# Patient Record
Sex: FEMALE | Race: ASIAN | NOT HISPANIC OR LATINO | Employment: FULL TIME | URBAN - METROPOLITAN AREA
[De-identification: names, ages, dates, MRNs, and addresses within clinical notes are randomized per-mention and may not be internally consistent; named-entity substitution may affect disease eponyms.]

---

## 2017-08-21 ENCOUNTER — NON-PROVIDER VISIT (OUTPATIENT)
Dept: URGENT CARE | Facility: PHYSICIAN GROUP | Age: 22
End: 2017-08-21

## 2017-08-21 DIAGNOSIS — Z02.1 PRE-EMPLOYMENT DRUG SCREENING: ICD-10-CM

## 2017-08-21 LAB
AMP AMPHETAMINE: NORMAL
COC COCAINE: NORMAL
INT CON NEG: NEGATIVE
INT CON POS: POSITIVE
MET METHAMPHETAMINES: NORMAL
OPI OPIATES: NORMAL
PCP PHENCYCLIDINE: NORMAL
POC DRUG COMMENT 753798-OCCUPATIONAL HEALTH: NORMAL
THC: NORMAL

## 2017-08-21 PROCEDURE — 80305 DRUG TEST PRSMV DIR OPT OBS: CPT | Performed by: PHYSICIAN ASSISTANT

## 2020-06-24 ENCOUNTER — APPOINTMENT (OUTPATIENT)
Dept: RADIOLOGY | Facility: MEDICAL CENTER | Age: 25
End: 2020-06-24
Attending: EMERGENCY MEDICINE
Payer: MEDICAID

## 2020-06-24 ENCOUNTER — HOSPITAL ENCOUNTER (EMERGENCY)
Facility: MEDICAL CENTER | Age: 25
End: 2020-06-24
Attending: EMERGENCY MEDICINE
Payer: MEDICAID

## 2020-06-24 VITALS
WEIGHT: 98.11 LBS | RESPIRATION RATE: 14 BRPM | DIASTOLIC BLOOD PRESSURE: 57 MMHG | BODY MASS INDEX: 18.52 KG/M2 | TEMPERATURE: 97.8 F | HEIGHT: 61 IN | HEART RATE: 86 BPM | OXYGEN SATURATION: 98 % | SYSTOLIC BLOOD PRESSURE: 99 MMHG

## 2020-06-24 DIAGNOSIS — Z3A.13 13 WEEKS GESTATION OF PREGNANCY: ICD-10-CM

## 2020-06-24 DIAGNOSIS — N39.0 ACUTE UTI: ICD-10-CM

## 2020-06-24 LAB
ABO GROUP BLD: NORMAL
ALBUMIN SERPL BCP-MCNC: 3.7 G/DL (ref 3.2–4.9)
ALBUMIN/GLOB SERPL: 1 G/DL
ALP SERPL-CCNC: 62 U/L (ref 30–99)
ALT SERPL-CCNC: 21 U/L (ref 2–50)
ANION GAP SERPL CALC-SCNC: 11 MMOL/L (ref 7–16)
APPEARANCE UR: ABNORMAL
AST SERPL-CCNC: 20 U/L (ref 12–45)
B-HCG SERPL-ACNC: ABNORMAL MIU/ML (ref 0–5)
BACTERIA #/AREA URNS HPF: ABNORMAL /HPF
BASOPHILS # BLD AUTO: 0.5 % (ref 0–1.8)
BASOPHILS # BLD: 0.07 K/UL (ref 0–0.12)
BILIRUB SERPL-MCNC: 0.5 MG/DL (ref 0.1–1.5)
BILIRUB UR QL STRIP.AUTO: NEGATIVE
BUN SERPL-MCNC: 9 MG/DL (ref 8–22)
CALCIUM SERPL-MCNC: 8.6 MG/DL (ref 8.5–10.5)
CHLORIDE SERPL-SCNC: 101 MMOL/L (ref 96–112)
CO2 SERPL-SCNC: 20 MMOL/L (ref 20–33)
COLOR UR: ABNORMAL
CREAT SERPL-MCNC: 0.5 MG/DL (ref 0.5–1.4)
EOSINOPHIL # BLD AUTO: 0.11 K/UL (ref 0–0.51)
EOSINOPHIL NFR BLD: 0.8 % (ref 0–6.9)
EPI CELLS #/AREA URNS HPF: ABNORMAL /HPF
ERYTHROCYTE [DISTWIDTH] IN BLOOD BY AUTOMATED COUNT: 40.7 FL (ref 35.9–50)
GLOBULIN SER CALC-MCNC: 3.6 G/DL (ref 1.9–3.5)
GLUCOSE SERPL-MCNC: 91 MG/DL (ref 65–99)
GLUCOSE UR STRIP.AUTO-MCNC: NEGATIVE MG/DL
HCG SERPL QL: POSITIVE
HCT VFR BLD AUTO: 36.5 % (ref 37–47)
HGB BLD-MCNC: 12.8 G/DL (ref 12–16)
HYALINE CASTS #/AREA URNS LPF: ABNORMAL /LPF
IMM GRANULOCYTES # BLD AUTO: 0.07 K/UL (ref 0–0.11)
IMM GRANULOCYTES NFR BLD AUTO: 0.5 % (ref 0–0.9)
KETONES UR STRIP.AUTO-MCNC: ABNORMAL MG/DL
LEUKOCYTE ESTERASE UR QL STRIP.AUTO: ABNORMAL
LIPASE SERPL-CCNC: 28 U/L (ref 11–82)
LYMPHOCYTES # BLD AUTO: 1.78 K/UL (ref 1–4.8)
LYMPHOCYTES NFR BLD: 13.5 % (ref 22–41)
MCH RBC QN AUTO: 31.1 PG (ref 27–33)
MCHC RBC AUTO-ENTMCNC: 35.1 G/DL (ref 33.6–35)
MCV RBC AUTO: 88.8 FL (ref 81.4–97.8)
MICRO URNS: ABNORMAL
MONOCYTES # BLD AUTO: 0.89 K/UL (ref 0–0.85)
MONOCYTES NFR BLD AUTO: 6.8 % (ref 0–13.4)
NEUTROPHILS # BLD AUTO: 10.24 K/UL (ref 2–7.15)
NEUTROPHILS NFR BLD: 77.9 % (ref 44–72)
NITRITE UR QL STRIP.AUTO: POSITIVE
NRBC # BLD AUTO: 0 K/UL
NRBC BLD-RTO: 0 /100 WBC
PH UR STRIP.AUTO: 6.5 [PH] (ref 5–8)
PLATELET # BLD AUTO: 224 K/UL (ref 164–446)
PMV BLD AUTO: 9.9 FL (ref 9–12.9)
POTASSIUM SERPL-SCNC: 3.1 MMOL/L (ref 3.6–5.5)
PROT SERPL-MCNC: 7.3 G/DL (ref 6–8.2)
PROT UR QL STRIP: 30 MG/DL
RBC # BLD AUTO: 4.11 M/UL (ref 4.2–5.4)
RBC # URNS HPF: ABNORMAL /HPF
RBC UR QL AUTO: ABNORMAL
RH BLD: NORMAL
SODIUM SERPL-SCNC: 132 MMOL/L (ref 135–145)
SP GR UR STRIP.AUTO: 1.02
UROBILINOGEN UR STRIP.AUTO-MCNC: 1 MG/DL
WBC # BLD AUTO: 13.2 K/UL (ref 4.8–10.8)
WBC #/AREA URNS HPF: ABNORMAL /HPF

## 2020-06-24 PROCEDURE — 87186 SC STD MICRODIL/AGAR DIL: CPT | Mod: EDC

## 2020-06-24 PROCEDURE — 76801 OB US < 14 WKS SINGLE FETUS: CPT

## 2020-06-24 PROCEDURE — 83690 ASSAY OF LIPASE: CPT | Mod: EDC

## 2020-06-24 PROCEDURE — 96365 THER/PROPH/DIAG IV INF INIT: CPT | Mod: EDC

## 2020-06-24 PROCEDURE — 80053 COMPREHEN METABOLIC PANEL: CPT | Mod: EDC

## 2020-06-24 PROCEDURE — 85025 COMPLETE CBC W/AUTO DIFF WBC: CPT | Mod: EDC

## 2020-06-24 PROCEDURE — 86901 BLOOD TYPING SEROLOGIC RH(D): CPT | Mod: EDC

## 2020-06-24 PROCEDURE — 96375 TX/PRO/DX INJ NEW DRUG ADDON: CPT | Mod: EDC

## 2020-06-24 PROCEDURE — 87086 URINE CULTURE/COLONY COUNT: CPT | Mod: EDC

## 2020-06-24 PROCEDURE — 84703 CHORIONIC GONADOTROPIN ASSAY: CPT | Mod: EDC

## 2020-06-24 PROCEDURE — 700105 HCHG RX REV CODE 258: Mod: EDC | Performed by: EMERGENCY MEDICINE

## 2020-06-24 PROCEDURE — 86900 BLOOD TYPING SEROLOGIC ABO: CPT | Mod: EDC

## 2020-06-24 PROCEDURE — 81001 URINALYSIS AUTO W/SCOPE: CPT | Mod: EDC

## 2020-06-24 PROCEDURE — 87077 CULTURE AEROBIC IDENTIFY: CPT | Mod: EDC

## 2020-06-24 PROCEDURE — 700111 HCHG RX REV CODE 636 W/ 250 OVERRIDE (IP): Mod: EDC | Performed by: EMERGENCY MEDICINE

## 2020-06-24 PROCEDURE — 84702 CHORIONIC GONADOTROPIN TEST: CPT | Mod: EDC

## 2020-06-24 PROCEDURE — 99284 EMERGENCY DEPT VISIT MOD MDM: CPT | Mod: EDC

## 2020-06-24 RX ORDER — ACETAMINOPHEN 500 MG
500-1000 TABLET ORAL EVERY 6 HOURS PRN
Status: ON HOLD | COMMUNITY
End: 2022-11-08

## 2020-06-24 RX ORDER — SODIUM CHLORIDE 9 MG/ML
1000 INJECTION, SOLUTION INTRAVENOUS ONCE
Status: COMPLETED | OUTPATIENT
Start: 2020-06-24 | End: 2020-06-24

## 2020-06-24 RX ORDER — CEPHALEXIN 500 MG/1
500 CAPSULE ORAL 4 TIMES DAILY
Qty: 40 CAP | Refills: 0 | Status: ON HOLD
Start: 2020-06-24 | End: 2020-12-25

## 2020-06-24 RX ORDER — ONDANSETRON 2 MG/ML
4 INJECTION INTRAMUSCULAR; INTRAVENOUS ONCE
Status: COMPLETED | OUTPATIENT
Start: 2020-06-24 | End: 2020-06-24

## 2020-06-24 RX ADMIN — SODIUM CHLORIDE 1000 ML: 9 INJECTION, SOLUTION INTRAVENOUS at 09:33

## 2020-06-24 RX ADMIN — CEFTRIAXONE SODIUM 1 G: 1 INJECTION, POWDER, FOR SOLUTION INTRAMUSCULAR; INTRAVENOUS at 10:44

## 2020-06-24 RX ADMIN — ONDANSETRON 4 MG: 2 INJECTION INTRAMUSCULAR; INTRAVENOUS at 09:33

## 2020-06-24 SDOH — HEALTH STABILITY: MENTAL HEALTH: HOW OFTEN DO YOU HAVE A DRINK CONTAINING ALCOHOL?: NEVER

## 2020-06-24 NOTE — ED NOTES
"Shae Peguero D/C'd.  Discharge instructions including s/s to return to ED, follow up appointments, hydration importance antibiotic education, and the importance of OB follow up  provided to pt.    Pt verbalized understanding with no further questions and concerns.    Copy of discharge provided to pt.  Signed copy in chart.    Prescription for Keflex provided to pt.   Pt ambulats out of department; pt in NAD, awake, alert, interactive and age appropriate.  VS BP (!) 99/57   Pulse 86   Temp 36.6 °C (97.8 °F) (Temporal)   Resp 14   Ht 1.549 m (5' 1\")   Wt 44.5 kg (98 lb 1.7 oz)   SpO2 98%   BMI 18.54 kg/m² ERP is aware of BP, pt denies dizziness.     "

## 2020-06-24 NOTE — ED NOTES
Pt reports having a positive pregnancy test but is unsure if she is pregnant. Pt denies fever or dysuria, reports pain to the abd with voiding and having BM.

## 2020-06-24 NOTE — ED TRIAGE NOTES
..  Chief Complaint   Patient presents with   • Abdominal Pain     abd pain x's 2 day. N/V x's 2 weeks. pt states she has not had a period in 3 months and is currently trying to get preg.        .Explained triage process, to waiting room. Asked to inform RN if questions or concerns arise.

## 2020-06-24 NOTE — ED PROVIDER NOTES
ED Provider Note    CHIEF COMPLAINT  Chief Complaint   Patient presents with   • Abdominal Pain     abd pain x's 2 day. N/V x's 2 weeks. pt states she has not had a period in 3 months and is currently trying to get preg.        HPI  Shae Peguero is a 25 y.o. female here for evaluation of abdominal pain. The pt states that 'I think I am pregnant.'  She has been having n/v over the last week or so, but no diarrhea. She took a pregnancy test at home, and it was positive a couple of months ago, but she has not had any follow up since that time.       ROS  See HPI for further details, o/w negative.     PAST MEDICAL HISTORY   pregnancy    SOCIAL HISTORY  Social History     Tobacco Use   • Smoking status: Never Smoker   • Smokeless tobacco: Never Used   Substance and Sexual Activity   • Alcohol use: Never     Frequency: Never   • Drug use: Never   • Sexual activity: Not on file       Family History  No bleeding disorders    SURGICAL HISTORY  patient denies any surgical history    CURRENT MEDICATIONS  Home Medications     Reviewed by Consuelo Hartmann R.N. (Registered Nurse) on 06/24/20 at 0815  Med List Status: Not Addressed   Medication Last Dose Status   acetaminophen (TYLENOL) 500 MG Tab  Active                ALLERGIES  No Known Allergies    REVIEW OF SYSTEMS  See HPI for further details. Review of systems as above, otherwise all other systems are negative.     PHYSICAL EXAM  Constitutional: Well developed, well nourished. No acute distress.  HEENT: Normocephalic, atraumatic. Posterior pharynx clear and moist.  Eyes:  EOMI. Normal sclera.  Neck: Supple, Full range of motion, nontender.  Chest/Pulmonary: clear to ausculation. Symmetrical expansion.   Cardio: Regular rate and rhythm with no murmur.   Abdomen: Soft, mild ll quad tenderness. No peritoneal signs. No guarding. No palpable masses.  Back: No CVA tenderness, nontender midline, no step offs.  Musculoskeletal: No deformity, no edema, neurovascular intact.    Neuro: Clear speech, appropriate, cooperative, cranial nerves II-XII grossly intact.  Psych: Normal mood and affect    PROCEDURES     MEDICAL RECORD  I have reviewed patient's medical record and pertinent results are listed.    COURSE & MEDICAL DECISION MAKING  I have reviewed any medical record information, laboratory studies and radiographic results as noted above.    11:14 AM  US-OB 1ST TRIMESTER SINGLE GEST Is the patient pregnant? Yes   Final Result      1.  Viable single intrauterine gestation of an estimated menstrual age 13 weeks 1 day. Ultrasound SAMUEL 12/29/2020. Clinical SAMUEL 1/6/2021.      2.  Anterior placenta. No perigestational hemorrhage.          If you have had any blood pressure issues while here in the emergency department, please see your doctor for a further evaluation or work up.    11:14 AM  At this time, the pt has a normal IUP at 13 weeks, and a uti.  She has been given rocephin here, for the uti, and will continue with keflex. She will follow up with gyn, or the pregnancy center, her choice. She  Has no vaginal bleeding, no vaginal discharge. She will follow up.    Differential diagnoses include but not limited to: ectopic, uti, normal pregnancy    This patient presents with pregnancy and uti .  At this time, I have counseled the patient/family regarding their medications, pain control, and follow up.  They will continue their medications, if any, as prescribed.  They will return immediately for any worsening symptoms and/or any other medical concerns.  They will see their doctor, or contact the doctor provided, in 1-2 days for follow up.       FINAL IMPRESSION  Pregnancy,  uti     Electronically signed by: Benny Hancock D.O., 6/24/2020 9:22 AM

## 2020-06-26 LAB
BACTERIA UR CULT: ABNORMAL
BACTERIA UR CULT: ABNORMAL
SIGNIFICANT IND 70042: ABNORMAL
SITE SITE: ABNORMAL
SOURCE SOURCE: ABNORMAL

## 2020-06-27 NOTE — ED NOTES
"ED Positive Culture Follow-up/Notification Note:    Date: 6/27/2020     Patient seen in the ED on 6/24/2020 for abd pain, n/v. Pt is 13 wks pregnant.     1. 13 weeks gestation of pregnancy    2. Acute UTI       Discharge Medication List as of 6/24/2020 12:03 PM      START taking these medications    Details   cephALEXin (KEFLEX) 500 MG Cap Take 1 Cap by mouth 4 times a day., Disp-40 Cap,R-0, Print Plain Paper             Allergies: Patient has no known allergies.     Vitals:    06/24/20 0823 06/24/20 0953 06/24/20 1124 06/24/20 1153   BP: (!) 98/64 (!) 92/55 (!) 93/55 (!) 99/57   Pulse: (!) 103 77 83 86   Resp: 16 16 16 14   Temp: 36.6 °C (97.9 °F) 36.9 °C (98.5 °F)  36.6 °C (97.8 °F)   TempSrc: Temporal Temporal  Temporal   SpO2: 97% 100% 98% 98%   Weight:       Height: 1.549 m (5' 1\")          Final cultures:   Results     Procedure Component Value Units Date/Time    URINE CULTURE(NEW) [[id number]]  (Abnormal)  (Susceptibility) Collected:  06/24/20 0850    Order Status:  Completed Specimen:  Urine Updated:  06/26/20 1057     Significant Indicator POS     Source UR     Site -     Culture Result -      Escherichia coli  >100,000 cfu/mL      Susceptibility     Escherichia coli (1)     Antibiotic Interpretation Microscan Method Status    Ampicillin Sensitive <=8 mcg/mL CAROLYN Final    Ceftriaxone Sensitive <=1 mcg/mL CAROLYN Final    Ceftazidime Sensitive <=1 mcg/mL CAROLYN Final    Cefotaxime Sensitive <=2 mcg/mL CAROLYN Final    Cefazolin Sensitive <=2 mcg/mL CAROLYN Final    Ciprofloxacin Sensitive <=1 mcg/mL CAROLYN Final    Ampicillin/sulbactam Sensitive <=8/4 mcg/mL CAROLYN Final    Cefepime Sensitive <=2 mcg/mL CAROLYN Final    Tobramycin Sensitive <=4 mcg/mL CAROLYN Final    Cefotetan Sensitive <=16 mcg/mL CAROLYN Final    Nitrofurantoin Sensitive <=32 mcg/mL CAROLYN Final    Gentamicin Sensitive <=4 mcg/mL CAROLYN Final    Levofloxacin Sensitive <=2 mcg/mL CAROLYN Final    Pip/Tazobactam Sensitive <=16 mcg/mL CAROLYN Final    Trimeth/Sulfa Sensitive <=2/38 mcg/mL " CAROLYN Final                   URINALYSIS,CULTURE IF INDICATED [[id number]]  (Abnormal) Collected:  06/24/20 0877    Order Status:  Completed Specimen:  Blood Updated:  06/24/20 0947     Color DK Yellow     Character Turbid     Specific Gravity 1.023     Ph 6.5     Glucose Negative mg/dL      Ketones Trace mg/dL      Protein 30 mg/dL      Bilirubin Negative     Urobilinogen, Urine 1.0     Nitrite Positive     Leukocyte Esterase Moderate     Occult Blood Large     Micro Urine Req Microscopic          Plan:   Appropriate antibiotic therapy prescribed. No changes required based upon culture result.  Spoke to patient to notify of positive culture result and encourage compliance with prescribed antibiotics.     Promise Guidry, BakariD

## 2020-08-19 ENCOUNTER — APPOINTMENT (OUTPATIENT)
Dept: OBGYN | Facility: CLINIC | Age: 25
End: 2020-08-19

## 2020-08-28 ENCOUNTER — INITIAL PRENATAL (OUTPATIENT)
Dept: OBGYN | Facility: CLINIC | Age: 25
End: 2020-08-28

## 2020-08-28 ENCOUNTER — HOSPITAL ENCOUNTER (OUTPATIENT)
Facility: MEDICAL CENTER | Age: 25
End: 2020-08-28
Attending: ADVANCED PRACTICE MIDWIFE
Payer: COMMERCIAL

## 2020-08-28 VITALS
WEIGHT: 115.8 LBS | HEIGHT: 60 IN | DIASTOLIC BLOOD PRESSURE: 60 MMHG | SYSTOLIC BLOOD PRESSURE: 98 MMHG | BODY MASS INDEX: 22.74 KG/M2

## 2020-08-28 DIAGNOSIS — Z34.02 ENCOUNTER FOR SUPERVISION OF NORMAL FIRST PREGNANCY IN SECOND TRIMESTER: ICD-10-CM

## 2020-08-28 LAB
APPEARANCE UR: NORMAL
BILIRUB UR STRIP-MCNC: NORMAL MG/DL
COLOR UR AUTO: NORMAL
GLUCOSE UR STRIP.AUTO-MCNC: NEGATIVE MG/DL
KETONES UR STRIP.AUTO-MCNC: NEGATIVE MG/DL
LEUKOCYTE ESTERASE UR QL STRIP.AUTO: NORMAL
NITRITE UR QL STRIP.AUTO: POSITIVE
PH UR STRIP.AUTO: 6.5 [PH] (ref 5–8)
PROT UR QL STRIP: NORMAL MG/DL
RBC UR QL AUTO: NORMAL
SP GR UR STRIP.AUTO: 1.01
UROBILINOGEN UR STRIP-MCNC: NORMAL MG/DL

## 2020-08-28 PROCEDURE — 81002 URINALYSIS NONAUTO W/O SCOPE: CPT | Performed by: ADVANCED PRACTICE MIDWIFE

## 2020-08-28 PROCEDURE — 8198 PREG CTR PKG RATE (SYSTEM): Performed by: ADVANCED PRACTICE MIDWIFE

## 2020-08-28 PROCEDURE — 59401 PR NEW OB VISIT: CPT | Performed by: ADVANCED PRACTICE MIDWIFE

## 2020-08-28 ASSESSMENT — EDINBURGH POSTNATAL DEPRESSION SCALE (EPDS)
I HAVE BEEN ANXIOUS OR WORRIED FOR NO GOOD REASON: NO, NOT AT ALL
I HAVE LOOKED FORWARD WITH ENJOYMENT TO THINGS: AS MUCH AS I EVER DID
THINGS HAVE BEEN GETTING ON TOP OF ME: NO, I HAVE BEEN COPING AS WELL AS EVER
THE THOUGHT OF HARMING MYSELF HAS OCCURRED TO ME: NEVER
TOTAL SCORE: 0
I HAVE BEEN SO UNHAPPY THAT I HAVE HAD DIFFICULTY SLEEPING: NOT AT ALL
I HAVE FELT SCARED OR PANICKY FOR NO GOOD REASON: NO, NOT AT ALL
I HAVE FELT SAD OR MISERABLE: NO, NOT AT ALL
I HAVE BEEN SO UNHAPPY THAT I HAVE BEEN CRYING: NO, NEVER
I HAVE BLAMED MYSELF UNNECESSARILY WHEN THINGS WENT WRONG: NO, NEVER
I HAVE BEEN ABLE TO LAUGH AND SEE THE FUNNY SIDE OF THINGS: AS MUCH AS I ALWAYS COULD

## 2020-08-28 ASSESSMENT — FIBROSIS 4 INDEX: FIB4 SCORE: 0.45

## 2020-08-28 NOTE — PROGRESS NOTES
Pt. Here for NOB visit.  Pt was seen at [facility] ER on 6/24/2020 had blood work and U/S done went in for abdominal pain.  # [phone]  Pt. states no complaints.   States not feeling fetal movement yet.   Pharmacy verified.

## 2020-08-28 NOTE — PROGRESS NOTES
Risk factors:   Late prenatal care  Referrals made today:   none    Subjective:   Shae Peguero is a 25 y.o.  who presents for her new OB exam.  She is 22w3d with an SAMUEL of Estimated Date of Delivery: 20 by US.   She is feeling well and has no concerns at this time. She reports no ER visits or previous care in this pregnancy. Reports minimal fetal movement.    Vaginal bleeding no  Pain   no  Cramping  no    Past Medical History:   Diagnosis Date   • Urinary tract infection        Psych History   Depression:   no  Anxiety   no  Bipolar    no  Postpartum Mood Changes no    History reviewed. No pertinent surgical history.     OB History    Para Term  AB Living   1             SAB TAB Ectopic Molar Multiple Live Births                    # Outcome Date GA Lbr Sudeep/2nd Weight Sex Delivery Anes PTL Lv   1 Current                 Gynecological History  STIs  no  HSV  no  Abnormal pap Never had pap      Family History   Problem Relation Age of Onset   • No Known Problems Mother    • No Known Problems Father    • No Known Problems Sister    • No Known Problems Brother      Denies any genetic disorders in family history.     FOB is involved   Pregnancy is un planned but desired.    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  .   Denies alcohol use, drug use, or tobacco use in pregnancy.     Denies any current or hx of sexual, emotional or physical abuse or trauma.     Current Medications: PNV    Allergies: NKDA    Desires AFP  Declines CF    Objective:      Vitals:    20 0852   Weight: 52.5 kg (115 lb 12.8 oz)   Height: 1.524 m (5')        See Prenatal Physical and Prenatal Vitals  UA WNL today      Assessment:      1.  IUP @ 22w3d per US      2.  S=D      3.  See problem list as follows     There are no active problems to display for this patient.        Plan:   -  Pap with gc/chl done today   - Prenatal labs ordered - lab slip provided  - Discussed PNV,  nutrition, adequate water intake, and exercise/weight gain in pregnancy  - S/sx of pregnancy warning signs and PTL precautions given  - Complete OB US in ASAP wks  - We discussed fetal movement and I have asked her to focus more on the movements. Bedside US showing minimal movement in fetus but adequate fluid. Reassuring at current.   - Return to clinic in 4 weeks.

## 2020-08-31 LAB
C TRACH DNA GENITAL QL NAA+PROBE: POSITIVE
CYTOLOGY REG CYTOL: ABNORMAL
N GONORRHOEA DNA GENITAL QL NAA+PROBE: NEGATIVE
SPECIMEN SOURCE: ABNORMAL

## 2020-09-01 DIAGNOSIS — O98.812 CHLAMYDIA INFECTION AFFECTING PREGNANCY IN SECOND TRIMESTER: ICD-10-CM

## 2020-09-01 DIAGNOSIS — A74.9 CHLAMYDIA INFECTION AFFECTING PREGNANCY IN SECOND TRIMESTER: ICD-10-CM

## 2020-09-01 PROBLEM — O98.819 CHLAMYDIA INFECTION AFFECTING PREGNANCY: Status: ACTIVE | Noted: 2020-09-01

## 2020-09-01 RX ORDER — AZITHROMYCIN 500 MG/1
1000 TABLET, FILM COATED ORAL ONCE
Qty: 1 TAB | Refills: 0 | Status: SHIPPED | OUTPATIENT
Start: 2020-09-01 | End: 2020-09-01

## 2020-09-16 ENCOUNTER — TELEPHONE (OUTPATIENT)
Dept: OBGYN | Facility: CLINIC | Age: 25
End: 2020-09-16

## 2020-09-16 NOTE — TELEPHONE ENCOUNTER
----- Message from FRANCES Andrew sent at 2020  7:58 AM PDT -----  Pt had Chlamydia, rx sent, I can also call in for partner. No sex for one week after treatment and all sexual partners from past three months need treatment.    Pt notified regarding positive chlamydia and need to  Rx from her pharmacy and Advised pt make a note of the date she took medication because she needs to be retested 4 wks after she had taken meds. Advised for her partner to be treated as well and wants Rx to be send for partner. Also inform No sex for one week after treatment and all sexual partners from past three months need treatment. Pt verbalized understanding.   HD form and lab results faxed to [facility].    Partners name is Matias Peguero CHELSE[address] 1995 [address] Notified.

## 2020-09-25 ENCOUNTER — ROUTINE PRENATAL (OUTPATIENT)
Dept: OBGYN | Facility: CLINIC | Age: 25
End: 2020-09-25

## 2020-09-25 VITALS — BODY MASS INDEX: 23.63 KG/M2 | SYSTOLIC BLOOD PRESSURE: 100 MMHG | DIASTOLIC BLOOD PRESSURE: 60 MMHG | WEIGHT: 121 LBS

## 2020-09-25 DIAGNOSIS — Z34.80 SUPERVISION OF OTHER NORMAL PREGNANCY, ANTEPARTUM: Primary | ICD-10-CM

## 2020-09-25 PROCEDURE — 90686 IIV4 VACC NO PRSV 0.5 ML IM: CPT | Performed by: NURSE PRACTITIONER

## 2020-09-25 PROCEDURE — 90040 PR PRENATAL FOLLOW UP: CPT | Performed by: NURSE PRACTITIONER

## 2020-09-25 PROCEDURE — 90471 IMMUNIZATION ADMIN: CPT | Performed by: NURSE PRACTITIONER

## 2020-09-25 ASSESSMENT — FIBROSIS 4 INDEX: FIB4 SCORE: 0.45

## 2020-09-25 NOTE — PROGRESS NOTES
S) Pt is a 25 y.o.   at 26w3d  gestation. Routine prenatal care today. No complaints today. Has not done any blood work or ultrasound. Wasn't aware that she needed to get it done. Discussed importance of testing and also of fetal surveillance. She states she will go tomorrow to get these done. Glucose ordered today. She took her antibiotics for chlamydia on 20, so she will need ERWIN next visit.  labor precautions discussed. Flu shot today. All questions answered.    Fetal movement Normal  Cramping no  VB no  LOF no   Denies dysuria. Generally feels well today. Good self-care activities identified. Denies headaches, swelling, visual changes, or epigastric pain .     O) /60   Wt 54.9 kg (121 lb)         Labs:       PNL: Not done       GCT: Ordered today        AFP: Not Examined       GBS: N/A       Pertinent ultrasound -        Not done    A) IUP at 26w3d       S=D         Patient Active Problem List    Diagnosis Date Noted   • Supervision of other normal pregnancy, antepartum 2020   • Chlamydia infection affecting pregnancy 2020          SVE: deferred       Chaperone offered: n/a         TDAP: no       FLU: yes        BTL: no       : n/a       C/S Consent: n/a       IOL or C/S scheduled: no       LAST PAP: 20- negative         P) s/s ptl vs general discomforts. Fetal movements reviewed. General ed and anticipatory guidance. Nutrition/exercise/vitamin. Plans breast Plans pp contraception- unsure  Continue PNV.

## 2020-09-25 NOTE — PROGRESS NOTES
Pt here today for OB follow up  Pt states no complaints   Reports +FM  Good # [phone]  Pharmacy Confirmed.  Chaperone offered and not indicated.   Pt states she took Rx for   +Chlamydia on 9/18/2020  Pt would like flu vaccine, consent signed   Pt states she is unsure on when she will complete her labs, 3rd trimester labs given today   Flu vaccine given. RIGHT  Deltoid. VIS given and screening check list reviewed with pt.

## 2020-09-28 ENCOUNTER — APPOINTMENT (OUTPATIENT)
Dept: RADIOLOGY | Facility: IMAGING CENTER | Age: 25
End: 2020-09-28

## 2020-09-28 ENCOUNTER — APPOINTMENT (OUTPATIENT)
Dept: RADIOLOGY | Facility: IMAGING CENTER | Age: 25
End: 2020-09-28
Attending: ADVANCED PRACTICE MIDWIFE

## 2020-09-28 ENCOUNTER — HOSPITAL ENCOUNTER (OUTPATIENT)
Dept: LAB | Facility: MEDICAL CENTER | Age: 25
End: 2020-09-28
Attending: NURSE PRACTITIONER

## 2020-09-28 DIAGNOSIS — Z34.02 ENCOUNTER FOR SUPERVISION OF NORMAL FIRST PREGNANCY IN SECOND TRIMESTER: ICD-10-CM

## 2020-09-28 DIAGNOSIS — Z34.80 SUPERVISION OF OTHER NORMAL PREGNANCY, ANTEPARTUM: ICD-10-CM

## 2020-09-28 LAB — GLUCOSE 1H P 50 G GLC PO SERPL-MCNC: 110 MG/DL (ref 70–139)

## 2020-09-28 PROCEDURE — 36415 COLL VENOUS BLD VENIPUNCTURE: CPT

## 2020-09-28 PROCEDURE — 76817 TRANSVAGINAL US OBSTETRIC: CPT | Mod: TC | Performed by: ADVANCED PRACTICE MIDWIFE

## 2020-09-28 PROCEDURE — 82950 GLUCOSE TEST: CPT

## 2020-09-28 PROCEDURE — 76805 OB US >/= 14 WKS SNGL FETUS: CPT | Mod: TC | Performed by: ADVANCED PRACTICE MIDWIFE

## 2020-09-29 ENCOUNTER — ROUTINE PRENATAL (OUTPATIENT)
Dept: OBGYN | Facility: CLINIC | Age: 25
End: 2020-09-29

## 2020-09-29 VITALS — SYSTOLIC BLOOD PRESSURE: 100 MMHG | BODY MASS INDEX: 24.02 KG/M2 | WEIGHT: 123 LBS | DIASTOLIC BLOOD PRESSURE: 56 MMHG

## 2020-09-29 DIAGNOSIS — A74.9 CHLAMYDIA INFECTION AFFECTING PREGNANCY IN SECOND TRIMESTER: ICD-10-CM

## 2020-09-29 DIAGNOSIS — Z34.80 SUPERVISION OF OTHER NORMAL PREGNANCY, ANTEPARTUM: ICD-10-CM

## 2020-09-29 DIAGNOSIS — O26.872 SHORT CERVIX DURING PREGNANCY IN SECOND TRIMESTER: ICD-10-CM

## 2020-09-29 DIAGNOSIS — O98.812 CHLAMYDIA INFECTION AFFECTING PREGNANCY IN SECOND TRIMESTER: ICD-10-CM

## 2020-09-29 DIAGNOSIS — Z3A.27 27 WEEKS GESTATION OF PREGNANCY: ICD-10-CM

## 2020-09-29 PROCEDURE — 90471 IMMUNIZATION ADMIN: CPT | Performed by: OBSTETRICS & GYNECOLOGY

## 2020-09-29 PROCEDURE — 90715 TDAP VACCINE 7 YRS/> IM: CPT | Performed by: OBSTETRICS & GYNECOLOGY

## 2020-09-29 PROCEDURE — 90040 PR PRENATAL FOLLOW UP: CPT | Performed by: OBSTETRICS & GYNECOLOGY

## 2020-09-29 ASSESSMENT — FIBROSIS 4 INDEX: FIB4 SCORE: 0.45

## 2020-09-29 NOTE — LETTER
"Count Your Baby's Movements  Another step to a healthy delivery    Shae Peguero              Dept: [phone]    How Many Weeks Pregnant?w0d    Date to Begin Countin2020              How to use this chart    One way for your physician to keep track of your baby's health is by knowing how often the baby moves (or \"kicks\") in your womb.  You can help your physician to do this by using this chart every day.    Every day, you should see how many hours it takes for your baby to move 10 times.  Start in the morning, as soon as you get up.    · First, write down the time your baby moves until you get to 10.  · Check off one box every time your baby moves until you get to 10.  · Write down the time you finished counting in the last column.  · Total how long it took to count up all 10 movements.  · Finally, fill in the box that shows how long this took.  After counting 10 movements, you no longer have to count any more that day.  The next morning, just start counting again as soon as you get up.    What should you call a \"movement\"?  It is hard to say, because it will feel different from one mother to another and from one pregnancy to the next.  The important thing is that you count the movements the same way throughout your pregnancy.  If you have more questions, you should ask your physician.    Count carefully every day!  SAMPLE:  Week 28    How many hours did it take to feel 10 movements?       Start  Time     1     2     3     4     5     6     7     8     9     10   Finish Time   Mon 8:20 ·  ·  ·  ·  ·  ·  ·  ·  ·  ·  11:40                  Sat               Sun                 IMPORTANT: You should contact your physician if it takes more than two hours for you to feel 10 movements.  Each morning, write down the time and start to count the movements of your baby.  Keep track by checking off one box every time you feel one movement.  When you have " "felt 10 \"kicks\", write down the time you finished counting in the last column.  Then fill in the   box (over the check lexie) for the number of hours it took.  Be sure to read the complete instructions on the previous page.            "

## 2020-09-29 NOTE — PROGRESS NOTES
Chief complaint: Return visit    S: Pt presents for routine OB follow up.  No contractions, vaginal bleeding, or leaking fluids. Good fetal movement.    Patient recently underwent an ultrasound we did show a shortened cervix approximately 16 mm.  She denies any vaginal bleeding, loss of fluid or contractions at this time.    Questions answered.    O: /56   Wt 55.8 kg (123 lb)   BMI 24.02 kg/m²   Patients' weight gain, fluid intake and exercise level discussed.  Vitals, fundal height , fetal position, and FHR reviewed on flowsheet    Lab:  Recent Results (from the past 336 hour(s))   GLUCOSE 1HR GESTATIONAL    Collection Time: 20 11:03 AM   Result Value Ref Range    Glucose, Post Dose 110 70 - 139 mg/dL       A/P:  25 y.o.  at 27w0d presents for routine obstetric follow-up.  Size equals dates and/or scan    1.  Continue prenatal vitamins.  2.  Begin kick counts at 28 weeks.  3.  Exercise at least 30 minutes daily.  4.  Drink at least 2 Liters of water daily  5.  Follow-up in 2 weeks.    Discussed with patient recommendations for vaginal progesterone in the instance short cervix.  Patient does not have any insurance at this time.  Discussed with patient the use of good Rx to help decrease the price of the prescription.  Coupon was printed and given to patient.  Plan will be to use Prometrium 200 mg tablets vaginally once daily.  Precautions discussed with patient.    Patient recent diagnosis of chlamydia.  Recently took the medication.  Will perform test of cure at next visit.    Patient has also not obtained prenatal panel.  Discussed with patient is extremely important to obtain these labs.  She reports obtaining a soon as possible

## 2020-09-29 NOTE — PROGRESS NOTES
Pt here today for OB follow up  Pt states no complaints   Reports +FM  Good # [phone]  Pharmacy Confirmed.  Chaperone offered and not inidicated.  Pt took meds for +chlamydia on 9/18/2020   Pt given LEAH sheet and instructions   Pt would like TDAP vaccine, consent signed  Tdap vaccine given today. RIGHT  Deltoid. VIS given and screening check list reviewed with pt.  Tdap vaccine verified by AM   Pt states unsure when she can complete  Labs

## 2020-10-12 ENCOUNTER — HOSPITAL ENCOUNTER (OUTPATIENT)
Dept: LAB | Facility: MEDICAL CENTER | Age: 25
End: 2020-10-12
Attending: ADVANCED PRACTICE MIDWIFE

## 2020-10-12 DIAGNOSIS — Z34.02 ENCOUNTER FOR SUPERVISION OF NORMAL FIRST PREGNANCY IN SECOND TRIMESTER: ICD-10-CM

## 2020-10-12 LAB
ABO GROUP BLD: NORMAL
BASOPHILS # BLD AUTO: 0.9 % (ref 0–1.8)
BASOPHILS # BLD: 0.12 K/UL (ref 0–0.12)
BLD GP AB SCN SERPL QL: NORMAL
EOSINOPHIL # BLD AUTO: 0 K/UL (ref 0–0.51)
EOSINOPHIL NFR BLD: 0 % (ref 0–6.9)
ERYTHROCYTE [DISTWIDTH] IN BLOOD BY AUTOMATED COUNT: 44.7 FL (ref 35.9–50)
HBV SURFACE AG SER QL: ABNORMAL
HCT VFR BLD AUTO: 37.3 % (ref 37–47)
HCV AB SER QL: ABNORMAL
HGB BLD-MCNC: 12.5 G/DL (ref 12–16)
HIV 1+2 AB+HIV1 P24 AG SERPL QL IA: NORMAL
LYMPHOCYTES # BLD AUTO: 4.14 K/UL (ref 1–4.8)
LYMPHOCYTES NFR BLD: 31.6 % (ref 22–41)
MANUAL DIFF BLD: NORMAL
MCH RBC QN AUTO: 30.5 PG (ref 27–33)
MCHC RBC AUTO-ENTMCNC: 33.5 G/DL (ref 33.6–35)
MCV RBC AUTO: 91 FL (ref 81.4–97.8)
METAMYELOCYTES NFR BLD MANUAL: 0.9 %
MONOCYTES # BLD AUTO: 0.12 K/UL (ref 0–0.85)
MONOCYTES NFR BLD AUTO: 0.9 % (ref 0–13.4)
MORPHOLOGY BLD-IMP: NORMAL
MYELOCYTES NFR BLD MANUAL: 3.5 %
NEUTROPHILS # BLD AUTO: 8.16 K/UL (ref 2–7.15)
NEUTROPHILS NFR BLD: 61.4 % (ref 44–72)
NEUTS BAND NFR BLD MANUAL: 0.9 % (ref 0–10)
NRBC # BLD AUTO: 0 K/UL
NRBC BLD-RTO: 0 /100 WBC
PLATELET # BLD AUTO: 295 K/UL (ref 164–446)
PLATELET BLD QL SMEAR: NORMAL
PMV BLD AUTO: 9.5 FL (ref 9–12.9)
RBC # BLD AUTO: 4.1 M/UL (ref 4.2–5.4)
RBC BLD AUTO: NORMAL
RH BLD: NORMAL
RUBV AB SER QL: 6.98 IU/ML
TREPONEMA PALLIDUM IGG+IGM AB [PRESENCE] IN SERUM OR PLASMA BY IMMUNOASSAY: ABNORMAL
WBC # BLD AUTO: 13.1 K/UL (ref 4.8–10.8)

## 2020-10-12 PROCEDURE — 86901 BLOOD TYPING SEROLOGIC RH(D): CPT

## 2020-10-12 PROCEDURE — 86762 RUBELLA ANTIBODY: CPT

## 2020-10-12 PROCEDURE — 86900 BLOOD TYPING SEROLOGIC ABO: CPT

## 2020-10-12 PROCEDURE — 86850 RBC ANTIBODY SCREEN: CPT

## 2020-10-12 PROCEDURE — 87389 HIV-1 AG W/HIV-1&-2 AB AG IA: CPT

## 2020-10-12 PROCEDURE — 36415 COLL VENOUS BLD VENIPUNCTURE: CPT

## 2020-10-12 PROCEDURE — 85027 COMPLETE CBC AUTOMATED: CPT

## 2020-10-12 PROCEDURE — 86780 TREPONEMA PALLIDUM: CPT

## 2020-10-12 PROCEDURE — 87340 HEPATITIS B SURFACE AG IA: CPT

## 2020-10-12 PROCEDURE — 86803 HEPATITIS C AB TEST: CPT

## 2020-10-12 PROCEDURE — 85007 BL SMEAR W/DIFF WBC COUNT: CPT

## 2020-10-12 PROCEDURE — 80307 DRUG TEST PRSMV CHEM ANLYZR: CPT

## 2020-10-14 PROBLEM — O09.899 RUBELLA NON-IMMUNE STATUS, ANTEPARTUM: Status: ACTIVE | Noted: 2020-10-14

## 2020-10-14 PROBLEM — Z28.39 RUBELLA NON-IMMUNE STATUS, ANTEPARTUM: Status: ACTIVE | Noted: 2020-10-14

## 2020-10-14 LAB
AMPHET CTO UR CFM-MCNC: NEGATIVE NG/ML
BARBITURATES CTO UR CFM-MCNC: NEGATIVE NG/ML
BENZODIAZ CTO UR CFM-MCNC: NEGATIVE NG/ML
CANNABINOIDS CTO UR CFM-MCNC: NEGATIVE NG/ML
COCAINE CTO UR CFM-MCNC: NEGATIVE NG/ML
DRUG COMMENT 753798: NORMAL
METHADONE CTO UR CFM-MCNC: NEGATIVE NG/ML
OPIATES CTO UR CFM-MCNC: NEGATIVE NG/ML
PCP CTO UR CFM-MCNC: NEGATIVE NG/ML
PROPOXYPH CTO UR CFM-MCNC: NEGATIVE NG/ML

## 2020-10-16 ENCOUNTER — ROUTINE PRENATAL (OUTPATIENT)
Dept: OBGYN | Facility: CLINIC | Age: 25
End: 2020-10-16

## 2020-10-16 VITALS — WEIGHT: 128.3 LBS | BODY MASS INDEX: 25.06 KG/M2 | DIASTOLIC BLOOD PRESSURE: 60 MMHG | SYSTOLIC BLOOD PRESSURE: 100 MMHG

## 2020-10-16 DIAGNOSIS — Z34.80 SUPERVISION OF OTHER NORMAL PREGNANCY, ANTEPARTUM: ICD-10-CM

## 2020-10-16 DIAGNOSIS — O26.873 SHORT CERVICAL LENGTH DURING PREGNANCY IN THIRD TRIMESTER: ICD-10-CM

## 2020-10-16 PROCEDURE — 90040 PR PRENATAL FOLLOW UP: CPT | Performed by: OBSTETRICS & GYNECOLOGY

## 2020-10-16 ASSESSMENT — FIBROSIS 4 INDEX: FIB4 SCORE: 0.34

## 2020-10-16 NOTE — PROGRESS NOTES
MICAELA:  29w3d    Pt reports doing well.  Denies vaginal bleeding, contractions, LOF.  Reports +FM.    There were no vitals taken for this visit.  gen: AAO, NAD  FHTs: 145  FH: 28    A/P: 25 y.o.  @ w3d    26yo  dated by 13w1d US  Problems this pregnancy:   --Chlamydia + on  - will need ERWIN 3 months after treating [ ]  --short cervix 1.6cm on  - [ ] vag progesterone [ ] repeat US ordered   --EFW 11%tile on 26w scan - repeat US for growth ordered  HBSag NR, HIV neg, RNI, RPR NR, Gc/Ct n/n, O+/Ab neg, Pap neg 20  Genetic screen: not done  Glucose: 110  Immunizations: tdap , flu done  Ultrasounds:  US @ 26w6d EFW 870g (11.5%tile), CL 1.6cm, ant plac, normal antomy, MARINA WNL  3rd tri labs: H/H , Plts 295, neg trep  GBS: [ ]  Delivery Plan: [ ]  Accepting of transfusion: [ ]  PPBCM: unsure - given [email or web address] for reference [ ] follow up

## 2020-10-16 NOTE — PROGRESS NOTES
OB follow up   + fetal movement.  No VB, LOF or UC's.  Flu vaccine current  Pt is having a follow up GC/CT today  Phone # [phone]  Preferred pharmacy confirmed.

## 2020-12-23 ENCOUNTER — HOSPITAL ENCOUNTER (INPATIENT)
Facility: MEDICAL CENTER | Age: 25
LOS: 2 days | End: 2020-12-25
Attending: OBSTETRICS & GYNECOLOGY | Admitting: OBSTETRICS & GYNECOLOGY
Payer: MEDICAID

## 2020-12-23 LAB
AMPHET UR QL SCN: NEGATIVE
BARBITURATES UR QL SCN: NEGATIVE
BASOPHILS # BLD AUTO: 0.4 % (ref 0–1.8)
BASOPHILS # BLD: 0.05 K/UL (ref 0–0.12)
BENZODIAZ UR QL SCN: NEGATIVE
BZE UR QL SCN: NEGATIVE
CANNABINOIDS UR QL SCN: NEGATIVE
COVID ORDER STATUS COVID19: NORMAL
EOSINOPHIL # BLD AUTO: 0.07 K/UL (ref 0–0.51)
EOSINOPHIL NFR BLD: 0.6 % (ref 0–6.9)
ERYTHROCYTE [DISTWIDTH] IN BLOOD BY AUTOMATED COUNT: 45.5 FL (ref 35.9–50)
GP B STREP DNA SPEC QL NAA+PROBE: NEGATIVE
HCT VFR BLD AUTO: 35.5 % (ref 37–47)
HGB BLD-MCNC: 11.6 G/DL (ref 12–16)
HOLDING TUBE BB 8507: NORMAL
IMM GRANULOCYTES # BLD AUTO: 0.15 K/UL (ref 0–0.11)
IMM GRANULOCYTES NFR BLD AUTO: 1.2 % (ref 0–0.9)
LYMPHOCYTES # BLD AUTO: 1.78 K/UL (ref 1–4.8)
LYMPHOCYTES NFR BLD: 14.4 % (ref 22–41)
MCH RBC QN AUTO: 26.2 PG (ref 27–33)
MCHC RBC AUTO-ENTMCNC: 32.7 G/DL (ref 33.6–35)
MCV RBC AUTO: 80.1 FL (ref 81.4–97.8)
METHADONE UR QL SCN: NEGATIVE
MONOCYTES # BLD AUTO: 0.68 K/UL (ref 0–0.85)
MONOCYTES NFR BLD AUTO: 5.5 % (ref 0–13.4)
NEUTROPHILS # BLD AUTO: 9.66 K/UL (ref 2–7.15)
NEUTROPHILS NFR BLD: 77.9 % (ref 44–72)
NRBC # BLD AUTO: 0 K/UL
NRBC BLD-RTO: 0 /100 WBC
OPIATES UR QL SCN: NEGATIVE
OXYCODONE UR QL SCN: NEGATIVE
PCP UR QL SCN: NEGATIVE
PLATELET # BLD AUTO: 270 K/UL (ref 164–446)
PMV BLD AUTO: 9.4 FL (ref 9–12.9)
PROPOXYPH UR QL SCN: NEGATIVE
RBC # BLD AUTO: 4.43 M/UL (ref 4.2–5.4)
SARS-COV-2 RDRP RESP QL NAA+PROBE: NOTDETECTED
SPECIMEN SOURCE: NORMAL
WBC # BLD AUTO: 12.4 K/UL (ref 4.8–10.8)

## 2020-12-23 PROCEDURE — 96366 THER/PROPH/DIAG IV INF ADDON: CPT

## 2020-12-23 PROCEDURE — 96375 TX/PRO/DX INJ NEW DRUG ADDON: CPT

## 2020-12-23 PROCEDURE — 87653 STREP B DNA AMP PROBE: CPT

## 2020-12-23 PROCEDURE — 87491 CHLMYD TRACH DNA AMP PROBE: CPT

## 2020-12-23 PROCEDURE — 87081 CULTURE SCREEN ONLY: CPT

## 2020-12-23 PROCEDURE — 96376 TX/PRO/DX INJ SAME DRUG ADON: CPT

## 2020-12-23 PROCEDURE — 59025 FETAL NON-STRESS TEST: CPT

## 2020-12-23 PROCEDURE — 770002 HCHG ROOM/CARE - OB PRIVATE (112)

## 2020-12-23 PROCEDURE — 700105 HCHG RX REV CODE 258: Performed by: NURSE PRACTITIONER

## 2020-12-23 PROCEDURE — 96365 THER/PROPH/DIAG IV INF INIT: CPT

## 2020-12-23 PROCEDURE — 80307 DRUG TEST PRSMV CHEM ANLYZR: CPT

## 2020-12-23 PROCEDURE — 700111 HCHG RX REV CODE 636 W/ 250 OVERRIDE (IP): Performed by: NURSE PRACTITIONER

## 2020-12-23 PROCEDURE — C9803 HOPD COVID-19 SPEC COLLECT: HCPCS | Performed by: OBSTETRICS & GYNECOLOGY

## 2020-12-23 PROCEDURE — U0004 COV-19 TEST NON-CDC HGH THRU: HCPCS

## 2020-12-23 PROCEDURE — 36415 COLL VENOUS BLD VENIPUNCTURE: CPT

## 2020-12-23 PROCEDURE — 87150 DNA/RNA AMPLIFIED PROBE: CPT

## 2020-12-23 PROCEDURE — 87591 N.GONORRHOEAE DNA AMP PROB: CPT

## 2020-12-23 PROCEDURE — 85025 COMPLETE CBC W/AUTO DIFF WBC: CPT

## 2020-12-23 PROCEDURE — 99283 EMERGENCY DEPT VISIT LOW MDM: CPT

## 2020-12-23 RX ORDER — MISOPROSTOL 200 UG/1
800 TABLET ORAL
Status: DISCONTINUED | OUTPATIENT
Start: 2020-12-23 | End: 2020-12-24 | Stop reason: HOSPADM

## 2020-12-23 RX ORDER — ONDANSETRON 2 MG/ML
4 INJECTION INTRAMUSCULAR; INTRAVENOUS EVERY 6 HOURS PRN
Status: CANCELLED | OUTPATIENT
Start: 2020-12-23

## 2020-12-23 RX ORDER — METHYLERGONOVINE MALEATE 0.2 MG/ML
0.2 INJECTION INTRAVENOUS
Status: DISCONTINUED | OUTPATIENT
Start: 2020-12-23 | End: 2020-12-24 | Stop reason: HOSPADM

## 2020-12-23 RX ORDER — ONDANSETRON 4 MG/1
4 TABLET, ORALLY DISINTEGRATING ORAL EVERY 6 HOURS PRN
Status: CANCELLED | OUTPATIENT
Start: 2020-12-23

## 2020-12-23 RX ORDER — DEXTROSE, SODIUM CHLORIDE, SODIUM LACTATE, POTASSIUM CHLORIDE, AND CALCIUM CHLORIDE 5; .6; .31; .03; .02 G/100ML; G/100ML; G/100ML; G/100ML; G/100ML
INJECTION, SOLUTION INTRAVENOUS CONTINUOUS
Status: DISCONTINUED | OUTPATIENT
Start: 2020-12-24 | End: 2020-12-25 | Stop reason: HOSPADM

## 2020-12-23 RX ORDER — SODIUM CHLORIDE, SODIUM LACTATE, POTASSIUM CHLORIDE, CALCIUM CHLORIDE 600; 310; 30; 20 MG/100ML; MG/100ML; MG/100ML; MG/100ML
INJECTION, SOLUTION INTRAVENOUS CONTINUOUS
Status: ACTIVE | OUTPATIENT
Start: 2020-12-23 | End: 2020-12-24

## 2020-12-23 RX ORDER — ALUMINA, MAGNESIA, AND SIMETHICONE 2400; 2400; 240 MG/30ML; MG/30ML; MG/30ML
30 SUSPENSION ORAL EVERY 6 HOURS PRN
Status: DISCONTINUED | OUTPATIENT
Start: 2020-12-23 | End: 2020-12-24 | Stop reason: HOSPADM

## 2020-12-23 RX ADMIN — FENTANYL CITRATE 100 MCG: 50 INJECTION, SOLUTION INTRAMUSCULAR; INTRAVENOUS at 22:36

## 2020-12-23 RX ADMIN — SODIUM CHLORIDE, POTASSIUM CHLORIDE, SODIUM LACTATE AND CALCIUM CHLORIDE: 600; 310; 30; 20 INJECTION, SOLUTION INTRAVENOUS at 19:06

## 2020-12-23 RX ADMIN — OXYTOCIN 2 MILLI-UNITS/MIN: 10 INJECTION, SOLUTION INTRAMUSCULAR; INTRAVENOUS at 19:06

## 2020-12-23 RX ADMIN — FENTANYL CITRATE 100 MCG: 50 INJECTION, SOLUTION INTRAMUSCULAR; INTRAVENOUS at 21:37

## 2020-12-23 ASSESSMENT — LIFESTYLE VARIABLES
EVER_SMOKED: NEVER
ALCOHOL_USE: NO

## 2020-12-23 ASSESSMENT — PATIENT HEALTH QUESTIONNAIRE - PHQ9
SUM OF ALL RESPONSES TO PHQ9 QUESTIONS 1 AND 2: 0
2. FEELING DOWN, DEPRESSED, IRRITABLE, OR HOPELESS: NOT AT ALL
1. LITTLE INTEREST OR PLEASURE IN DOING THINGS: NOT AT ALL

## 2020-12-23 ASSESSMENT — FIBROSIS 4 INDEX: FIB4 SCORE: 0.34

## 2020-12-24 LAB
C TRACH DNA SPEC QL NAA+PROBE: NEGATIVE
ERYTHROCYTE [DISTWIDTH] IN BLOOD BY AUTOMATED COUNT: 44.9 FL (ref 35.9–50)
GP B STREP DNA SPEC QL NAA+PROBE: NEGATIVE
HCT VFR BLD AUTO: 29.3 % (ref 37–47)
HGB BLD-MCNC: 9.5 G/DL (ref 12–16)
MCH RBC QN AUTO: 25.8 PG (ref 27–33)
MCHC RBC AUTO-ENTMCNC: 32.4 G/DL (ref 33.6–35)
MCV RBC AUTO: 79.6 FL (ref 81.4–97.8)
N GONORRHOEA DNA SPEC QL NAA+PROBE: NEGATIVE
PLATELET # BLD AUTO: 248 K/UL (ref 164–446)
PMV BLD AUTO: 9.8 FL (ref 9–12.9)
RBC # BLD AUTO: 3.68 M/UL (ref 4.2–5.4)
SPECIMEN SOURCE: NORMAL
WBC # BLD AUTO: 19.5 K/UL (ref 4.8–10.8)

## 2020-12-24 PROCEDURE — 90707 MMR VACCINE SC: CPT | Performed by: OBSTETRICS & GYNECOLOGY

## 2020-12-24 PROCEDURE — 770002 HCHG ROOM/CARE - OB PRIVATE (112)

## 2020-12-24 PROCEDURE — 304965 HCHG RECOVERY SERVICES

## 2020-12-24 PROCEDURE — 85027 COMPLETE CBC AUTOMATED: CPT

## 2020-12-24 PROCEDURE — A9270 NON-COVERED ITEM OR SERVICE: HCPCS | Performed by: OBSTETRICS & GYNECOLOGY

## 2020-12-24 PROCEDURE — 700101 HCHG RX REV CODE 250

## 2020-12-24 PROCEDURE — 0HQ9XZZ REPAIR PERINEUM SKIN, EXTERNAL APPROACH: ICD-10-PCS | Performed by: OBSTETRICS & GYNECOLOGY

## 2020-12-24 PROCEDURE — 700111 HCHG RX REV CODE 636 W/ 250 OVERRIDE (IP): Performed by: OBSTETRICS & GYNECOLOGY

## 2020-12-24 PROCEDURE — 90471 IMMUNIZATION ADMIN: CPT

## 2020-12-24 PROCEDURE — 59409 OBSTETRICAL CARE: CPT | Performed by: OBSTETRICS & GYNECOLOGY

## 2020-12-24 PROCEDURE — 3E0234Z INTRODUCTION OF SERUM, TOXOID AND VACCINE INTO MUSCLE, PERCUTANEOUS APPROACH: ICD-10-PCS | Performed by: OBSTETRICS & GYNECOLOGY

## 2020-12-24 PROCEDURE — 700102 HCHG RX REV CODE 250 W/ 637 OVERRIDE(OP): Performed by: OBSTETRICS & GYNECOLOGY

## 2020-12-24 PROCEDURE — 700111 HCHG RX REV CODE 636 W/ 250 OVERRIDE (IP): Performed by: NURSE PRACTITIONER

## 2020-12-24 PROCEDURE — 36415 COLL VENOUS BLD VENIPUNCTURE: CPT

## 2020-12-24 PROCEDURE — 59409 OBSTETRICAL CARE: CPT

## 2020-12-24 RX ORDER — MISOPROSTOL 200 UG/1
600 TABLET ORAL
Status: DISCONTINUED | OUTPATIENT
Start: 2020-12-24 | End: 2020-12-25 | Stop reason: HOSPADM

## 2020-12-24 RX ORDER — IBUPROFEN 800 MG/1
800 TABLET ORAL EVERY 8 HOURS PRN
Status: DISCONTINUED | OUTPATIENT
Start: 2020-12-24 | End: 2020-12-25 | Stop reason: HOSPADM

## 2020-12-24 RX ORDER — METHYLERGONOVINE MALEATE 0.2 MG/ML
0.2 INJECTION INTRAVENOUS
Status: DISCONTINUED | OUTPATIENT
Start: 2020-12-24 | End: 2020-12-25 | Stop reason: HOSPADM

## 2020-12-24 RX ORDER — ACETAMINOPHEN 325 MG/1
325 TABLET ORAL EVERY 4 HOURS PRN
Status: DISCONTINUED | OUTPATIENT
Start: 2020-12-24 | End: 2020-12-25 | Stop reason: HOSPADM

## 2020-12-24 RX ORDER — SODIUM CHLORIDE, SODIUM LACTATE, POTASSIUM CHLORIDE, CALCIUM CHLORIDE 600; 310; 30; 20 MG/100ML; MG/100ML; MG/100ML; MG/100ML
INJECTION, SOLUTION INTRAVENOUS PRN
Status: DISCONTINUED | OUTPATIENT
Start: 2020-12-24 | End: 2020-12-25 | Stop reason: HOSPADM

## 2020-12-24 RX ORDER — CARBOPROST TROMETHAMINE 250 UG/ML
250 INJECTION, SOLUTION INTRAMUSCULAR
Status: DISCONTINUED | OUTPATIENT
Start: 2020-12-24 | End: 2020-12-25 | Stop reason: HOSPADM

## 2020-12-24 RX ORDER — BISACODYL 10 MG
10 SUPPOSITORY, RECTAL RECTAL PRN
Status: DISCONTINUED | OUTPATIENT
Start: 2020-12-24 | End: 2020-12-25 | Stop reason: HOSPADM

## 2020-12-24 RX ORDER — LIDOCAINE HYDROCHLORIDE 10 MG/ML
INJECTION, SOLUTION INFILTRATION; PERINEURAL
Status: COMPLETED
Start: 2020-12-24 | End: 2020-12-24

## 2020-12-24 RX ORDER — ACETAMINOPHEN 325 MG/1
650 TABLET ORAL EVERY 4 HOURS PRN
Status: DISCONTINUED | OUTPATIENT
Start: 2020-12-24 | End: 2020-12-25 | Stop reason: HOSPADM

## 2020-12-24 RX ORDER — DOCUSATE SODIUM 100 MG/1
100 CAPSULE, LIQUID FILLED ORAL 2 TIMES DAILY PRN
Status: DISCONTINUED | OUTPATIENT
Start: 2020-12-24 | End: 2020-12-25 | Stop reason: HOSPADM

## 2020-12-24 RX ORDER — VITAMIN A ACETATE, BETA CAROTENE, ASCORBIC ACID, CHOLECALCIFEROL, .ALPHA.-TOCOPHEROL ACETATE, DL-, THIAMINE MONONITRATE, RIBOFLAVIN, NIACINAMIDE, PYRIDOXINE HYDROCHLORIDE, FOLIC ACID, CYANOCOBALAMIN, CALCIUM CARBONATE, FERROUS FUMARATE, ZINC OXIDE, CUPRIC OXIDE 3080; 12; 120; 400; 1; 1.84; 3; 20; 22; 920; 25; 200; 27; 10; 2 [IU]/1; UG/1; MG/1; [IU]/1; MG/1; MG/1; MG/1; MG/1; MG/1; [IU]/1; MG/1; MG/1; MG/1; MG/1; MG/1
1 TABLET, FILM COATED ORAL EVERY MORNING
Status: DISCONTINUED | OUTPATIENT
Start: 2020-12-24 | End: 2020-12-25 | Stop reason: HOSPADM

## 2020-12-24 RX ADMIN — OXYTOCIN 2000 ML/HR: 10 INJECTION, SOLUTION INTRAMUSCULAR; INTRAVENOUS at 03:05

## 2020-12-24 RX ADMIN — MEASLES, MUMPS, AND RUBELLA VIRUS VACCINE LIVE 0.5 ML: 1000; 12500; 1000 INJECTION, POWDER, LYOPHILIZED, FOR SUSPENSION SUBCUTANEOUS at 20:05

## 2020-12-24 RX ADMIN — OXYTOCIN 125 ML/HR: 10 INJECTION, SOLUTION INTRAMUSCULAR; INTRAVENOUS at 04:35

## 2020-12-24 RX ADMIN — PRENATAL WITH FERROUS FUM AND FOLIC ACID 1 TABLET: 3080; 920; 120; 400; 22; 1.84; 3; 20; 10; 1; 12; 200; 27; 25; 2 TABLET ORAL at 08:15

## 2020-12-24 RX ADMIN — FENTANYL CITRATE 100 MCG: 50 INJECTION, SOLUTION INTRAMUSCULAR; INTRAVENOUS at 00:22

## 2020-12-24 RX ADMIN — LIDOCAINE HYDROCHLORIDE: 10 INJECTION, SOLUTION INFILTRATION; PERINEURAL at 03:10

## 2020-12-24 ASSESSMENT — EDINBURGH POSTNATAL DEPRESSION SCALE (EPDS)
I HAVE FELT SAD OR MISERABLE: NO, NOT AT ALL
I HAVE BEEN ABLE TO LAUGH AND SEE THE FUNNY SIDE OF THINGS: AS MUCH AS I ALWAYS COULD
THINGS HAVE BEEN GETTING ON TOP OF ME: YES, MOST OF THE TIME I HAVEN'T BEEN ABLE TO COPE AT ALL
THE THOUGHT OF HARMING MYSELF HAS OCCURRED TO ME: NEVER
I HAVE BEEN SO UNHAPPY THAT I HAVE HAD DIFFICULTY SLEEPING: NOT VERY OFTEN
I HAVE BEEN ANXIOUS OR WORRIED FOR NO GOOD REASON: YES, SOMETIMES
I HAVE BEEN SO UNHAPPY THAT I HAVE BEEN CRYING: NO, NEVER
I HAVE LOOKED FORWARD WITH ENJOYMENT TO THINGS: RATHER LESS THAN I USED TO
I HAVE FELT SCARED OR PANICKY FOR NO GOOD REASON: NO, NOT MUCH
I HAVE BLAMED MYSELF UNNECESSARILY WHEN THINGS WENT WRONG: YES, MOST OF THE TIME

## 2020-12-24 NOTE — L&D DELIVERY NOTE
"        Delivery Note for Vaginal Delivery     Stage 1:  25 y.o. y/o  at 39w2d weeks admitted for active labor. Her pregnancy has been complicated by scant prenatal care, GBS unknown, short long bones. Her labor progressed with pitocin augmentation.  Fetal monitoring during labor was overall category 2.  Patient had nothing for pain control.     Stage II:  Just prior to complete dilation variable decelerations were noted with audible recover in between.  Position changes were implemented.  At 0257, she was noted to be complete.  She pushed for about 4 minutes and then was consented for vacuum delivery given prolonged variable.  See below for vacuum details.  She pushed through two more contractions to deliver a  viable, vigorous female infant on 20 at 0302 from KARINA position and right shoulder anterior.  The delivery was complicated by need for vaccuum. Shoulders were delivered easily.  Cord was clamped and cut, the infant was given to support team for assessment and cord gases were collected and Apgars at 1 and 5 minutes were 8/9 and the infant has not yet been weighed.    Stage III: Attention was then turned to active management of the third stage. The placenta was delivered spontaneously with gentle manual traction on the umbilical cord with countertraction maintained suprapubically.  On inspection, the placenta was intact and had normal insertion.  Upon delivery of the placenta, good hemostasis was noted with fundal massage and a 40 unit bolus of pitocin in IV infusion was administered per protocol.     Laceration repair: The perineum was inspected following delivery.  A right labial and 1st degree laceration were noted.  These were repaired with 2-0 vicryl after obtaining the patient's verbal consent in the usual fashion with good hemostasis achieved.     Vacuum Type: Delmi  Indication: Fetal intolerance to labor  Placement: Mid-flexion point  Seal Achieved: Yes  Pressure: \"in green zone\"  Pulls: 2  Pop " "offs: 0  Total time: 1\"  Fetal Status: No injury    Estimated blood loss for the above procedures was 250cc.     Mother and infant in stable condition, and family pleased with birth.     Lashawn Mcallister DO  [facility], Women's [facility]        "

## 2020-12-24 NOTE — PROGRESS NOTES
0500 Assessment completed, fundus firm, lochia light, POC reviewed, verbalized understanding. Denies pain at this time, will call if pain med intervention needed.

## 2020-12-24 NOTE — PROGRESS NOTES
Pt breathing well through UCs. D/w pt plan to perform amniotomy and pt happy with plan.     Cervix: /-1, BBOW, amniotomy performed with good return thick mec stained fluid  NST: Cat 1  TOCO: UCs q 1-3 min    A/P: IUP at 39w1d - continue augmentation with pitocin, pain control prn, anticipate .

## 2020-12-24 NOTE — CARE PLAN
Problem: Pain  Goal: Alleviation of Pain or a reduction in pain to the patient's comfort goal  Outcome: PROGRESSING AS EXPECTED  Note: Pain options discussed for pt during labor. Pt undecided at this time. Pt understands options available.      Problem: Risk for Infection, Impaired Wound Healing  Goal: Remain free from signs and symptoms of infection  Outcome: PROGRESSING AS EXPECTED  Note: Pt monitored for s/s of infection. None at this time.

## 2020-12-24 NOTE — H&P
History and Physical    Shae Peguero is a 25 y.o. female  -Para:     Gestational Age:  39w1d  Admitted for:   Active Labor  Admitted to  [facility].  [facility]    HPI: Patient is admitted with the above mentioned Chief Complaint and States   Loss of fluid:   negative  Abdominal Pain:  negative  Uterine Contractions:  Positive since 2300 yesterday  Vaginal Bleeding:  negative  Fetal Movement:  normal  Patient denies fever, chills, nausea, vomiting , headache, visual disturbance, or dysuria  No LMP recorded. Patient is pregnant.  Estimated Date of Delivery: 20  Final SAMUEL: 2020, by Ultrasound    Patient Active Problem List    Diagnosis Date Noted   • Rubella non-immune status 10/14/2020   • Supervision of other normal pregnancy, antepartum 2020   • Chlamydia infection affecting pregnancy 2020       Admitting DX: Delivery   Pregnancy Complications:  Shortened cervix, ?growth abnormalities  OB Risk Factors:   GBS unknown, non-compliance (lack of care since 10/16/20 at 29w3d)  Labor State:    Active phase labor.    History:   has a past medical history of Urinary tract infection. She also has no past medical history of Addisons disease ([facility]), Adrenal disorder ([facility]), Allergy, Anemia, Anxiety, Arrhythmia, Arthritis, Asthma, Blood transfusion without reported diagnosis, Cancer ([facility]), Cataract, CHF (congestive heart failure) ([facility]), Clotting disorder ([facility]), COPD (chronic obstructive pulmonary disease) ([facility]), Cushings syndrome ([facility]), Depression, Diabetes ([facility]), Diabetic neuropathy ([facility]), GERD (gastroesophageal reflux disease), Glaucoma, Goiter, Head ache, Heart attack ([facility]), Heart murmur, HIV (human immunodeficiency virus infection) ([facility]), Hyperlipidemia, Hypertension, IBD (inflammatory bowel disease), Kidney disease, Meningitis, Migraine, Muscle disorder, Osteoporosis, Parathyroid disorder ([facility]), Pituitary disease ([facility]),  Pulmonary emphysema (HCC), Seizure (HCC), Sickle cell disease (HCC), Stroke (HCC), Substance abuse (HCC), Thyroid disease, or Tuberculosis.     has no past surgical history on file.    OB History    Para Term  AB Living   1             SAB TAB Ectopic Molar Multiple Live Births                    # Outcome Date GA Lbr Sudeep/2nd Weight Sex Delivery Anes PTL Lv   1 Current                Medications:  No current facility-administered medications on file prior to encounter.      Current Outpatient Medications on File Prior to Encounter   Medication Sig Dispense Refill   • acetaminophen (TYLENOL) 500 MG Tab Take 500-1,000 mg by mouth every 6 hours as needed.     • cephALEXin (KEFLEX) 500 MG Cap Take 1 Cap by mouth 4 times a day. 40 Cap 0       Allergies:  Patient has no known allergies.    ROS:   Neuro: negative    Cardiovascular: negative  Gastro intestinal: negative  Genitourinary: negative            Physical Exam:  /72   Pulse 81   Temp 36.6 °C (97.9 °F) (Temporal)   Ht 1.524 m (5')   Wt 59 kg (130 lb)   SpO2 98%   BMI 25.39 kg/m²   Constitutional: healthy-appearing, Well-developed, well-nourished, in no acute distress  No JVD: while supine  HEENT: Thyroid without masses  Breast Exam: Inspection negative. No nipple discharge or bleeding. No masses or nodularity palpable  Cardio: regular rate and rhythm  Lung: unlabored respirations, no intercostal retractions or accessory muscle use, clear to auscultation without rales or wheezes  Abdomen: abdomen is soft without significant tenderness, masses, organomegaly or guarding  Extremity: extremities, peripheral pulses and reflexes normal, no edema, redness or tenderness in the calves or thighs, Homans sign is negative, no sign of DVT, feet normal, good pulses, normal color, temperature and sensation    Cervical Exam: 90%  Cervix Dilatation: 4-5  Station: negative 1 with BBOW  Pelvis: Adequate  Fetal Assessment: Fetal heart variability:  moderate  Fetal Heart Rate decelerations: none  Fetal Heart Rate accelerations: yes  Baseline FHR: 125 per minute  Uterine contractions: regular, every 3-7 minutes, palpate moderate  Estimated Fetal Weight: 2500 - 3000g      Labs:      Prenatal Results     General (Most Recent Result)     Test Value Date Time    ABO O  10/12/20 1030    Rh POS  10/12/20 1030    Antibody screen NEG  10/12/20 1030    HbA1c       Gonorrhea Negative  08/28/20 1033    Chlamydia  by PCR POSITIVE  08/28/20 1033    Chlamydia by DNA       RPR/Syphilus Non-Reactive  10/12/20 1021    HSV 1/2 by PCR (non-serum)       HSV 1/2 (serum)       HSV 1       HSV 2       HPV (16)       HPV (18)       HPV (other)       HBsAg Non-Reactive  10/12/20 1021    HIV-1 HIV-2 Antibodies Non-Reactive  10/12/20 1021    Rubella 6.98 IU/mL 10/12/20 1021    Tb             Pap Smear (Most Recent Result)     Test Value Date Time    Pap smear       Pap smear w/HPV       Pap smear w/CTNG       Pap smar w/HPV CTNG       Pap smear (reflex HPV ACUS)       Pap smear (reflex HPV ASCUS w/CTNG)  Abnormal    (See Report)   08/28/20 1033    Pathology gyn specimen  (See Report)   08/28/20 1033          Urinalysis (Most Recent Result)     Test Value Date Time    Urinalysis       Urinalysis (culture if indicated)  Abnormal    (See Report)   06/24/20 0850    POC urinalysis  (See Report)   08/28/20 1007    Urine drug screen       Urine drug screen (w/o conf)       Urine culture ([id number])       Urine culture ([id number])  Abnormal    (See Report)   06/24/20 0850          1st Trimester     Test Value Date Time    Hgb 12.8 g/dL 06/24/20 0856    Hct 36.5 % 06/24/20 0856    Fasting Glucose Tolerance       GTT, 1 hour       GTT, 2 hours       GTT, 3 hours             2nd Trimester     Test Value Date Time    Hgb       Hct       Urinalysis       Urine Culture       AST       ALT       Uric Acid       Fasting Glucose Tolerance       GTT, 1 hour       GTT, 2 hours       GTT, 3 hours        Urine Protein/Creatinine Ratio             3rd Trimester     Test Value Date Time    Hgb 12.5 g/dL 10/12/20 1021    Hct 37.3 % 10/12/20 1021    Platelet count 295 K/uL 10/12/20 1021    GBS (SALTER BROTH)       GBS (Direct)       Urinalysis       Urine Culture       Urine Drug Screen       Urine Protein/Creatinine Ratio             Congenital Disease Screening     Test Value Date Time    First Trimester Screen       Quad Screen       Sickle Cell       Thalassemia       [facility]       Cystic Fibrosis Carrier Study                     Assessment:  Gestational Age:  39w1d  Labor State:   Labor, Active  Risk Factors:   GBS unknown, non-compliance, lack of care from 29w on  Pregnancy Complications: shortened cervix, ?growth abnormalities    Patient Active Problem List    Diagnosis Date Noted   • Rubella non-immune status 10/14/2020   • Supervision of other normal pregnancy, antepartum 2020   • Chlamydia infection affecting pregnancy 2020       Plan:   Admitted for: Active Labor    CBC and UA  routine urinalysis with urine drug screen  Antibiotics: Not indicated at this time  GBS unknown- risk based assessment, no antibiotics at this time  Unsure of pain management, but aware of options  Discussed pitocin augmentation as needed    Unique Arita C.N.M.    Dr Mcallister is the attending today, and is aware of admission and POC

## 2020-12-24 NOTE — PROGRESS NOTES
Went to see patient to debrief about vacuum-assisted vaginal delivery.  Patient reports that she is doing very well and she fully understands indication for vacuum.  She reports that baby is doing well and she is very satisfied with her experience.  Denies any complaints this morning and reports that she does not have any further questions after our conversation.

## 2020-12-24 NOTE — LACTATION NOTE
This note was copied from a baby's chart.  Physical assessment of baby and mother provided. Introduction to basics of initiating breastfeeding shown at this time to include posture, angle of latch, hand expression, unswaddling and normal  feeding patterns and expectations.    Encouraged parents to wake baby every 2-3 hours and offer both breasts each time so she can get enough calories. [facility] information provided.

## 2020-12-24 NOTE — PROGRESS NOTES
0105- POC discussed, pt verbalized understanding.     0242- Dr. Mcallister called to bedside to evaluate. Charge called to bedside for assistance. IV bolus of LR started.     0249- Dr. Mcallister at bedside.     0300- vacuum applied, Charge, RT at bedside. NICU on standby      0302- Head out viable baby girl.     0440- Pt up to bathroom ambulatory in stable condition, successful void. Gown changed. Kiara care provided. Pad and panties applied. Pt transferred via wheelchair holding infant accompanied by s/o to MB . SBAR given to DMITRY Malin.

## 2020-12-24 NOTE — DISCHARGE PLANNING
"Discharge Planning Assessment Post Partum     Reason for Referral: Limited Prenatal Care    Address: [address] [place] [address]  Type of Living Situation: House with FOB and FOB's parents.     Mom Diagnosis: Pregnancy   Baby Diagnosis:   Primary Language: English      Name of Baby: Dona Peguero    Mother of Baby: Shae Peguero ([phone])  Father of the Baby: Matias Peguero        Involved in baby’s care? Yes   Contact Information: [phone] (same and MOB)     Prenatal Care: Limited [facility].  First appointment was at 22.3 weeks (Aug 28th) with [facility].  MOB went five times total (Aug 28, , ,  and Oct 16)  On Aug 28 MOB tested positive for Chlamydia. MOB was supposed to follow up in Nov and never made it to that appointment. MOB reported she stopped going to her prenatal appointments because she \"did not have transportation.\" When LSW asked about how baby was going to get to doctors appointments, MOB hesitated and then reported she would use her in-laws car.      Mom's PCP: None  PCP for new baby: Pediatrician list provided     Support System: Yes  Coping/Bonding between mother & baby: Yes  Source of Feeding: Breast   Supplies for Infant: Reported they do not own a car seat, but would borrow FOB's brothers car seat he uses for his children, as needed     Mom's Insurance: Medicaid FFS      Baby Covered on Insurance: MOB's insurance   Mother Employed/School: No, neither MOB or FOB work.  MOB reported FOB's parents give them money.    Other children in the home/names & ages: No, 1st child     Financial Hardship/Income: Denies  Mom's Mental status: Alert and Oriented x 4  Services used prior to admit: Foodstamps     CPS History: [place]- LSW placed a call to [facility] and spoke with Yin Phelps ([phone]) regarding concerns with MOB/FOB's ability to take baby to and from doctors appointments and all around care for baby.  Yin reported she would like a report on  " regarding mom and dads care for the baby (feeding, swaddling, appropriateness of parenting...) and would also like the results to the Chlamydia test that was completed 12/24.  LSW will leave in handoff report for tomorrow for LSW to f/u on.        Psychiatric History: Denies.  LSW explained the difference between PPD and baby blues and encouraged MOB to reach out if she is experiencing any heightened anxiety or depression.   Domestic Violence History: Denies   Drug/ETOH History: Denies     Resources Provided: Postpartum resources, Pediatrician list, MTM, WIC, Children and Family resources.    Referrals Made: None        Clearance for Discharge: Baby is not clear to discharge home with MOB/FOB at this time.  Please read CPS Hx note and [phone][facility] on 12/25 for discharge consent.       Ongoing Plan: Continue to provide support and resources to family until dc

## 2020-12-24 NOTE — PROGRESS NOTES
0700 - Bedside report received from Dea WATSON RN. Patient care assumed. Chart, prenatal labs, and orders reviewed  0820 - Pt assessment complete, wnl. Fundus firm with minimal discharge. Pt ambulating to bathroom and voiding without difficulty. Patient denies any dizziness or lightheadedness, any calf pain/tenderness, chest pain or SOB, respiratory symptoms, or any recent ill contacts. Kindly reminded patient and support person to wear face masks when staff is present in room. Plan of care discussed with patient for the day including infant feeding every 2-3 hours or on demand, pain management, and ambulation in halls. Pain medication plan discussed with patient; patient states she will call if PRN pain medication is wanted. All questions/concerns addressed at this time. Call light within reach, encouraged to call with needs. Will continue with routine postpartum cares.

## 2020-12-24 NOTE — PROGRESS NOTES
26 y/o , EDC , EGA 39.1. Pt here for UCs that began last night but have worsened int he last few hours. Pt states +FM, denies vaginal LOF/bleeding. Pt has not been seen in the office since 10/16, pt states she has had difficulty with rides to get there and she has been busy. Pt had pos chlamydia and was treated but no ERWIN has been done.  SVE: /-2. CHERYL Arita CNM updated. Orders to send GBS and Chlamydia and recheck SVE in an hour.   SVE Recheck : unchanged. Pt states feeling UCs stronger and more frequently. CHERYL Arita CNM updated. CNM coming to bedside to discuss POC with pt.   CHERYL Arita CNM at bedside discussing options with pt. Pt to be admitted. Pt transferred to labor room. Pt denies need for pain intervention at this time, options discussed.   - Pt requesting IV pain medication.    - Elle CNM at bedside. AROM Mec. SVE: 100/-1.    - Pt requesting IV pain medication. SVE: /-1. Pt denies wanting epidural, IV pain medication given.   0020 - SVE: 8-100/0. Pt declines wanting epidural, IV pain medication given.   0105 - Report given to Rachelle ANDRES.

## 2020-12-24 NOTE — CARE PLAN
Problem: Altered physiologic condition related to immediate post-delivery state and potential for bleeding/hemorrhage  Goal: Patient physiologically stable as evidenced by normal lochia, palpable uterine involution and vital signs within normal limits  Outcome: PROGRESSING AS EXPECTED  Note: Fundus is firm, lochia is light and vital signs are stable. Will continue to monitor with Q4 hour checks and patient rounding       Problem: Potential for postpartum infection related to presence of episiotomy/vaginal tear and/or uterine contamination  Goal: Patient will be absent from signs and symptoms of infection  Outcome: PROGRESSING AS EXPECTED  Note: Patient does not exhibit any signs/symptoms of infection and is afebrile. Will continue to monitor with Q6 hour checks and patient rounding

## 2020-12-25 VITALS
SYSTOLIC BLOOD PRESSURE: 100 MMHG | WEIGHT: 130 LBS | RESPIRATION RATE: 20 BRPM | HEART RATE: 78 BPM | TEMPERATURE: 98.6 F | HEIGHT: 60 IN | OXYGEN SATURATION: 98 % | BODY MASS INDEX: 25.52 KG/M2 | DIASTOLIC BLOOD PRESSURE: 62 MMHG

## 2020-12-25 PROCEDURE — 700102 HCHG RX REV CODE 250 W/ 637 OVERRIDE(OP): Performed by: OBSTETRICS & GYNECOLOGY

## 2020-12-25 PROCEDURE — A9270 NON-COVERED ITEM OR SERVICE: HCPCS | Performed by: OBSTETRICS & GYNECOLOGY

## 2020-12-25 RX ORDER — PSEUDOEPHEDRINE HCL 30 MG
100 TABLET ORAL 2 TIMES DAILY PRN
Qty: 120 CAP | Refills: 0 | Status: SHIPPED | OUTPATIENT
Start: 2020-12-25 | End: 2021-02-23

## 2020-12-25 RX ORDER — IBUPROFEN 800 MG/1
800 TABLET ORAL EVERY 8 HOURS PRN
Qty: 30 TAB | Refills: 0 | Status: SHIPPED | OUTPATIENT
Start: 2020-12-25 | End: 2021-01-24

## 2020-12-25 RX ORDER — ACETAMINOPHEN AND CODEINE PHOSPHATE 120; 12 MG/5ML; MG/5ML
1 SOLUTION ORAL DAILY
Qty: 28 TAB | Refills: 11 | Status: SHIPPED | OUTPATIENT
Start: 2020-12-25 | End: 2021-01-22

## 2020-12-25 RX ADMIN — PRENATAL WITH FERROUS FUM AND FOLIC ACID 1 TABLET: 3080; 920; 120; 400; 22; 1.84; 3; 20; 10; 1; 12; 200; 27; 25; 2 TABLET ORAL at 07:59

## 2020-12-25 NOTE — DISCHARGE INSTRUCTIONS
POSTPARTUM DISCHARGE INSTRUCTIONS FOR MOM    YOB: 1995   Age: 25 y.o.               Admit Date: 12/23/2020     Discharge Date: 12/25/2020  Attending Doctor:  Lashawn Mcallister, *                  Allergies:  Patient has no known allergies.    Discharged to home by car. Discharged via wheelchair, hospital escort: Yes.  Special equipment needed: Not Applicable  Belongings with: Personal  Be sure to schedule a follow-up appointment with your primary care doctor or any specialists as instructed.     Discharge Plan:   Diet Plan: Discussed  Activity Level: Discussed  Confirmed Follow up Appointment: Patient to Call and Schedule Appointment  Confirmed Symptoms Management: Discussed  Medication Reconciliation Updated: Yes  Influenza Vaccine Indication: Not indicated: Previously immunized this influenza season and > 8 years of age    REASONS TO CALL YOUR OBSTETRICIAN:  1.   Persistent fever or shaking chills (Temperature higher than 100.4)  2.   Heavy bleeding (soaking more than 1 pad per hour); Passing clots  3.   Foul odor from vagina  4.   Mastitis (Breast infection; breast pain, chills, fever, redness)  5.   Urinary pain, burning or frequency  6.   Episiotomy infection  7.   Abdominal incision infection  8.   Severe depression longer than 24 hours    HAND WASHING  · Prior to handling the baby.  · Before breastfeeding or bottle feeding baby.  · After using the bathroom or changing the baby's diaper.        VAGINAL CARE  · Nothing inside vagina for 6 weeks: no sexual intercourse, tampons or douching.  · Bleeding may continue for 2-4 weeks.  Amount may vary.    · Call your physician for heavy bleeding which means soaking more than 1 pad per hour    BIRTH CONTROL  · It is possible to become pregnant at any time after delivery and while breastfeeding.  · Plan to discuss a method of birth control with your physician at your follow up visit. visit.    DIET AND ELIMINATION  · Eating more fiber (bran cereal,  "fruits, and vegetables) and drinking plenty of fluids will help to avoid constipation.  · Urinary frequency after childbirth is normal.    POSTPARTUM BLUES  During the first few days after birth, you may experience a sense of the \"blues\" which may include impatience, irritability or even crying.  These feeling come and go quickly.  However, as many as 1 in 10 women experience emotional symptoms known as postpartum depression.    Postpartum depression:  May start as early as the second or third day after delivery or take several weeks or months to develop.  Symptoms of \"blues\" are present, but are more intense:  Crying spells; loss of appetite; feelings of hopelessness or loss of control; fear of touching the baby; over concern or no concern at all about the baby; little or no concern about your own appearance/caring for yourself; and/or inability to sleep or excessive sleeping.  Contact your physician if you are experiencing any of these symptoms.    Crisis Hotline:  · [place] Crisis Hotline:  3-564-UQNICBT  Or 1-358.650.9266  · [place] Crisis Hotline:  1-127.228.7074  Or 062-833-5778    PREVENTING SHAKEN BABY:  If you are angry or stressed, PUT THE BABY IN THE CRIB, step away, take some deep breaths, and wait until you are calm to care for the baby.  DO NOT SHAKE THE BABY.  You are not alone, call a supporter for help.    · Crisis Call Center 24/7 crisis line 908-939-6086 or 1-311.204.7969  · You can also text them, text \"ANSWER\" to 768271    QUIT SMOKING/TOBACCO USE:  I understand the use of any tobacco products increases my chance of suffering from future heart disease and could cause other illnesses which may shorten my life. Quitting the use of tobacco products is the single most important thing I can do to improve my health. For further information on smoking / tobacco cessation call a Toll Free Quit Line at 1-914.795.8555 (*[employer] [place]) or 1-389.959.5448 (American Lung Association) or you can " access the web based program at www.lungusa.org.    · [place] Tobacco Users Help Line:  (198) 382-6653       Toll Free: 1-277.856.5922  · Quit Tobacco Program [facility] (774)396-5031    DEPRESSION / SUICIDE RISK:  As you are discharged from this [facility], it is important to learn how to keep safe from harming yourself.    Recognize the warning signs:  · Abrupt changes in personality, positive or negative- including increase in energy   · Giving away possessions  · Change in eating patterns- significant weight changes-  positive or negative  · Change in sleeping patterns- unable to sleep or sleeping all the time   · Unwillingness or inability to communicate  · Depression  · Unusual sadness, discouragement and loneliness  · Talk of wanting to die  · Neglect of personal appearance   · Rebelliousness- reckless behavior  · Withdrawal from people/activities they love  · Confusion- inability to concentrate     If you or a loved one observes any of these behaviors or has concerns about self-harm, here's what you can do:  · Talk about it- your feelings and reasons for harming yourself  · Remove any means that you might use to hurt yourself (examples: pills, rope, extension cords, firearm)  · Get professional help from the community (Mental Health, Substance Abuse, psychological counseling)  · Do not be alone:Call your Safe Contact- someone whom you trust who will be there for you.  · Call your local CRISIS HOTLINE 635-0869 or 417-564-2142  · Call your local Children's Mobile Crisis Response Team [place] (621) 360-8217 or www.Jamplify  · Call the toll free National Suicide Prevention Hotlines   · National Suicide Prevention Lifeline 316-280-ZXRI (6166)  · National Hope Line Network 800-SUICIDE (911-2557)    DISCHARGE SURVEY:  Thank you for choosing [facility].  We hope we provided you with very good care.  You may be receiving a survey in the mail.  Please fill it out.   Your opinion is valuable to us.    ADDITIONAL EDUCATIONAL MATERIALS GIVEN TO PATIENT:  Pelvic rest x 6 weeks  No heavy lifting until cleared by physician  Return to ED or come to the office for severe headache, shortness of breath, chest pain, heavy vaginal bleeding, incisional drainage, foul smelling vaginal discharge, or fever >100.4  - discharge home with progesterone only bc pills       My signature on this form indicates that:  1.  I have reviewed and understand the above information  2.  My questions regarding this information have been answered to my satisfaction.  3.  I have formulated a plan with my discharge nurse to obtain my prescribed medication for home.

## 2020-12-25 NOTE — PROGRESS NOTES
ID bands verified by this RN. Discharge instructions reviewed with patient and signed by patient. Follow up appointments and discharge medications reviewed with patient; informed patient that prescriptions sent to preferred pharmacy. All questions addressed at this time. Instructed to press call light when infant strapped in car seat for car seat check.

## 2020-12-25 NOTE — PROGRESS NOTES
Assumed care of patient, report at bedside from, Mei ANDRES. Assessment completed and WDL. Call light within reach, discussed plan of care, denies pain at the moment. Will continue to monitor.

## 2020-12-25 NOTE — CARE PLAN
Problem: Potential for postpartum infection related to presence of episiotomy/vaginal tear and/or uterine contamination  Goal: Patient will be absent from signs and symptoms of infection  Outcome: PROGRESSING AS EXPECTED  Note: Patient does not exhibit any signs/symptoms of infection and is afebrile. Will continue to monitor with Q6 hour checks and patient rounding     Problem: Alteration in comfort related to episiotomy, vaginal repair and/or after birth pains  Goal: Patient is able to ambulate, care for self and infant  Outcome: PROGRESSING AS EXPECTED  Note: Patient has demonstrated that she is able to ambulate and care for self and infant independently. Pain relief is met with PRN medication orders. Will continue to monitor with Q6 hour checks and patient rounding.

## 2020-12-25 NOTE — PROGRESS NOTES
0700 - Bedside report received from Fatou OTERO RN. Patient care assumed. Chart, prenatal labs, and orders reviewed  0800 - Pt assessment complete, wnl. Fundus firm with minimal discharge. Pt ambulating to bathroom and voiding without difficulty. Patient denies any dizziness or lightheadedness, any calf pain/tenderness, chest pain or SOB, respiratory symptoms, or any recent ill contacts. Kindly reminded patient and support person to wear face masks when staff is present in room. Plan of care discussed with patient for the day including infant feeding every 2-3 hours or on demand, pain management, and ambulation in halls. Pain medication plan discussed with patient; patient states she will call if PRN pain medication is wanted. All questions/concerns addressed at this time. Call light within reach, encouraged to call with needs. Will continue with routine postpartum cares.

## 2020-12-25 NOTE — CARE PLAN
Problem: Pain  Goal: Alleviation of Pain or a reduction in pain to the patient's comfort goal  Outcome: MET  Goal: Patient will have relaxed facial expressions and be able to rest between uterine contractions  Outcome: MET     Problem: Risk for Infection, Impaired Wound Healing  Goal: Remain free from signs and symptoms of infection  Outcome: MET  Goal: Promotion of Wound Healing  Outcome: MET     Problem: Communication  Goal: The ability to communicate needs accurately and effectively will improve  Outcome: MET     Problem: Safety  Goal: Will remain free from injury  Outcome: MET  Goal: Will remain free from falls  Outcome: MET     Problem: Infection  Goal: Will remain free from infection  Outcome: MET     Problem: Venous Thromboembolism (VTW)/Deep Vein Thrombosis (DVT) Prevention:  Goal: Patient will participate in Venous Thrombosis (VTE)/Deep Vein Thrombosis (DVT)Prevention Measures  Outcome: MET     Problem: Bowel/Gastric:  Goal: Normal bowel function is maintained or improved  Outcome: MET  Goal: Will not experience complications related to bowel motility  Outcome: MET     Problem: Knowledge Deficit  Goal: Knowledge of disease process/condition, treatment plan, diagnostic tests, and medications will improve  Outcome: MET  Goal: Knowledge of the prescribed therapeutic regimen will improve  Outcome: MET     Problem: Discharge Barriers/Planning  Goal: Patient's continuum of care needs will be met  Outcome: MET     Problem: Altered physiologic condition related to immediate post-delivery state and potential for bleeding/hemorrhage  Goal: Patient physiologically stable as evidenced by normal lochia, palpable uterine involution and vital signs within normal limits  Outcome: MET     Problem: Potential for postpartum infection related to presence of episiotomy/vaginal tear and/or uterine contamination  Goal: Patient will be absent from signs and symptoms of infection  12/25/2020 1347 by Minna Penn,  KEITH  Outcome: MET  2020 1248 by Minna Penn R.N.  Outcome: PROGRESSING AS EXPECTED  Note: Patient does not exhibit any signs/symptoms of infection and is afebrile. Will continue to monitor with Q6 hour checks and patient rounding     Problem: Alteration in comfort related to episiotomy, vaginal repair and/or after birth pains  Goal: Patient is able to ambulate, care for self and infant  2020 1347 by Minna Penn R.N.  Outcome: MET  2020 1248 by Minna Penn R.N.  Outcome: PROGRESSING AS EXPECTED  Note: Patient has demonstrated that she is able to ambulate and care for self and infant independently. Pain relief is met with PRN medication orders. Will continue to monitor with Q6 hour checks and patient rounding.   Goal: Patient verbalizes acceptable pain level  Outcome: MET     Problem: Potential knowledge deficit related to lack of understanding of self and  care  Goal: Patient will verbalize understanding of self and infant care  Outcome: MET  Goal: Patient will demonstrate ability to care for self and infant  Outcome: MET     Problem: Potential anxiety related to difficulty adapting to parental role  Goal: Patient will verbalize and demonstrate effective bonding and parenting behavior  Outcome: MET     Problem: Fluid Volume:  Goal: Will maintain balanced intake and output  Outcome: MET     Problem: Pain Management  Goal: Pain level will decrease to patient's comfort goal  Outcome: MET

## 2020-12-25 NOTE — DISCHARGE SUMMARY
Discharge Summary:     Date of Admission: 2020  Date of Discharge: 20      Admitting diagnosis:    1. Pregnancy @ 39w2d  2. Minimal prenatal care       Discharge Diagnosis:   1. Status post vacuum extraction.    Pregnancy Complications: chlamydia infection (untreated); minimal prenatal care  Tubal Ligation:  no    Past Medical History:   Diagnosis Date   • Urinary tract infection      OB History    Para Term  AB Living   1 1 1     1   SAB TAB Ectopic Molar Multiple Live Births           0 1      # Outcome Date GA Lbr Sudeep/2nd Weight Sex Delivery Anes PTL Lv   1 Term 20 39w2d / 00:10 3.265 kg (7 lb 3.2 oz) F Vag-Vacuum None N HUGO     No past surgical history on file.  Patient has no known allergies.    Patient Active Problem List   Diagnosis   • Chlamydia infection affecting pregnancy   • Supervision of other normal pregnancy, antepartum   • Rubella non-immune status       Hospital Course:   25 y.o. , now para 1, was admitted with the above mentioned diagnosis, underwent Active Labor, vacuum extraction. Pt gave birth to baby girl with APGARs of 8/9 and weight 3265g.  Patient's postpartum course was unremarkable, with progressive advancement in diet , ambulation and toleration of oral analgesia. Patient without complaints today and desires discharge.  For postpartum contraception, pt desires progesterone only pills.    Physical Exam:  Temp:  [36.3 °C (97.3 °F)-37.2 °C (98.9 °F)] 37 °C (98.6 °F)  Pulse:  [78-92] 78  Resp:  [18-20] 20  BP: ()/(51-62) 100/62  SpO2:  [93 %-98 %] 98 %  Physical Exam  General: well  Chest/Breasts: nipples intact   Abdomen: minimal tenderness, soft, non-distended  Fundus: firm and below umbilicus  Incision: not applicable, (vaginal delivery)  Perineum: deferred  Extremities: symmetric and no edema, calves nontender    Current Facility-Administered Medications   Medication Dose   • oxytocin (PITOCIN) infusion (for postpartum)   mL/hr   •  ibuprofen (MOTRIN) tablet 800 mg  800 mg   • acetaminophen (Tylenol) tablet 325 mg  325 mg   • acetaminophen (Tylenol) tablet 650 mg  650 mg   • LR infusion     • PRN oxytocin (PITOCIN) (20 Units/1000 mL) PRN for excessive uterine bleeding - See Admin Instr  125-999 mL/hr   • miSOPROStol (CYTOTEC) tablet 600 mcg  600 mcg   • methylergonovine (METHERGINE) injection 0.2 mg  0.2 mg   • carboPROST (HEMABATE) injection 250 mcg  250 mcg   • docusate sodium (COLACE) capsule 100 mg  100 mg   • bisacodyl (DULCOLAX) suppository 10 mg  10 mg   • magnesium hydroxide (MILK OF MAGNESIA) suspension 30 mL  30 mL   • prenatal plus vitamin (STUARTNATAL 1+1) 27-1 MG tablet 1 Tab  1 Tab   • D5LR infusion     • oxytocin (PITOCIN) infusion (for induction)  0.5-20 yudith-units/min       Recent Labs     20  1806 20  1226   WBC 12.4* 19.5*   RBC 4.43 3.68*   HEMOGLOBIN 11.6* 9.5*   HEMATOCRIT 35.5* 29.3*   MCV 80.1* 79.6*   MCH 26.2* 25.8*   MCHC 32.7* 32.4*   RDW 45.5 44.9   PLATELETCT 270 248   MPV 9.4 9.8       Activity:   Discharge to home  Pelvic Rest x 6 weeks  Call or come to ED for: heavy vaginal bleeding, fever >100.4, severe abdominal pain, severe headache, chest pain, shortness of breath,  N/V, incisional drainage, or other concerns.      Assessment:  normal postpartum course     [facility] or [facility] in 5 weeks for vaginal delivery; 1 week for incision check for  delivery.      Discharge Instructions:  Pelvic rest x 6 weeks  No heavy lifting until cleared by physician  Return to ED or come to the office for severe headache, shortness of breath, chest pain, heavy vaginal bleeding, incisional drainage, foul smelling vaginal discharge, or fever >100.4  - discharge home with progesterone only bc pills      Discharge Meds:   No current outpatient medications on file.

## 2020-12-25 NOTE — CARE PLAN
Problem: Alteration in comfort related to episiotomy, vaginal repair and/or after birth pains  Goal: Patient verbalizes acceptable pain level  Outcome: PROGRESSING AS EXPECTED  Note: Pain controlled with prn medications per mar. Pt will call to request pain medications. Will offer pain medications as they become available. Pain management expectations discussed with pt, plan made for the day regarding how to address pain.

## 2020-12-25 NOTE — DISCHARGE PLANNING
"LSW completed chart review. Please see post partum assessment from SHAHEEN Haynes. This LSW followed up with the RN this morning who reports MOB and FOB are very appropriate with baby, initiate feedings, and baby came back with a negative chlamydia test. LSW then follow up with Aleja with [facility] and relayed this information. [facility] \"[facility]". Therefore, baby cleared to d/c with MOB/FOB upon medical clearance.   "

## 2020-12-25 NOTE — LACTATION NOTE
This note was copied from a baby's chart.  Mother reports that she is breastfeeding her  without difficulty or discomfort. WIC referral was faxed. Manual breast pump provided so mother has a 'back-up' option as her resources are limited at this time. Advised her to let her nurse know if she would like to try it prior to discharge, she is currently breastfeeding.

## 2021-01-13 NOTE — ED PROVIDER NOTES
S: Pt is a 26 y.o.  at 39w1d with Estimated Date of Delivery: 20 who presents to triage c/o RUC's.  Denies VB or LOF.  Reports good FM.      O: /62   Pulse 78   Temp 37 °C (98.6 °F) (Temporal)   Resp 20   Ht 1.524 m (5')   Wt 59 kg (130 lb)   SpO2 98%   Breastfeeding Yes   BMI 25.39 kg/m²          NST: Done and read by me         Indication: Term IUP, rule out labor       FHR: 125       Variability: moderate       Acclerations: present       Decelerations: absent       Reactive: yes, category 1         Erin: Regular UCs       SVE: /-2         A/P  Patient Active Problem List    Diagnosis Date Noted   • Rubella non-immune status 10/14/2020   • Supervision of other normal pregnancy, antepartum 2020   • Chlamydia infection affecting pregnancy 2020       1.  IUP @ 39w2d  2.  Reactive, category 1 NST.  3.  Labor check- will reassess  4.  GBS to be collected    Unique Arita CNM, APRN

## 2021-06-09 ENCOUNTER — APPOINTMENT (OUTPATIENT)
Dept: RADIOLOGY | Facility: MEDICAL CENTER | Age: 26
End: 2021-06-09
Attending: EMERGENCY MEDICINE

## 2021-06-09 ENCOUNTER — HOSPITAL ENCOUNTER (EMERGENCY)
Facility: MEDICAL CENTER | Age: 26
End: 2021-06-09
Attending: EMERGENCY MEDICINE

## 2021-06-09 VITALS
SYSTOLIC BLOOD PRESSURE: 104 MMHG | HEART RATE: 75 BPM | OXYGEN SATURATION: 100 % | TEMPERATURE: 98 F | DIASTOLIC BLOOD PRESSURE: 61 MMHG | HEIGHT: 60 IN | WEIGHT: 132 LBS | BODY MASS INDEX: 25.91 KG/M2 | RESPIRATION RATE: 18 BRPM

## 2021-06-09 DIAGNOSIS — R31.9 URINARY TRACT INFECTION WITH HEMATURIA, SITE UNSPECIFIED: ICD-10-CM

## 2021-06-09 DIAGNOSIS — R55 SYNCOPE, UNSPECIFIED SYNCOPE TYPE: ICD-10-CM

## 2021-06-09 DIAGNOSIS — N39.0 URINARY TRACT INFECTION WITH HEMATURIA, SITE UNSPECIFIED: ICD-10-CM

## 2021-06-09 DIAGNOSIS — Z3A.14 14 WEEKS GESTATION OF PREGNANCY: ICD-10-CM

## 2021-06-09 LAB
APPEARANCE UR: ABNORMAL
B-HCG SERPL-ACNC: ABNORMAL MIU/ML (ref 0–5)
BACTERIA #/AREA URNS HPF: ABNORMAL /HPF
BASOPHILS # BLD AUTO: 0.8 % (ref 0–1.8)
BASOPHILS # BLD: 0.07 K/UL (ref 0–0.12)
BILIRUB UR QL STRIP.AUTO: NEGATIVE
COLOR UR: YELLOW
EKG IMPRESSION: NORMAL
EOSINOPHIL # BLD AUTO: 0.16 K/UL (ref 0–0.51)
EOSINOPHIL NFR BLD: 1.8 % (ref 0–6.9)
EPI CELLS #/AREA URNS HPF: ABNORMAL /HPF
ERYTHROCYTE [DISTWIDTH] IN BLOOD BY AUTOMATED COUNT: 46.4 FL (ref 35.9–50)
GLUCOSE UR STRIP.AUTO-MCNC: NEGATIVE MG/DL
HCT VFR BLD AUTO: 37.8 % (ref 37–47)
HGB BLD-MCNC: 12.8 G/DL (ref 12–16)
HYALINE CASTS #/AREA URNS LPF: ABNORMAL /LPF
IMM GRANULOCYTES # BLD AUTO: 0.03 K/UL (ref 0–0.11)
IMM GRANULOCYTES NFR BLD AUTO: 0.3 % (ref 0–0.9)
KETONES UR STRIP.AUTO-MCNC: NEGATIVE MG/DL
LEUKOCYTE ESTERASE UR QL STRIP.AUTO: ABNORMAL
LYMPHOCYTES # BLD AUTO: 2.11 K/UL (ref 1–4.8)
LYMPHOCYTES NFR BLD: 23.2 % (ref 22–41)
MCH RBC QN AUTO: 28.3 PG (ref 27–33)
MCHC RBC AUTO-ENTMCNC: 33.9 G/DL (ref 33.6–35)
MCV RBC AUTO: 83.4 FL (ref 81.4–97.8)
MICRO URNS: ABNORMAL
MONOCYTES # BLD AUTO: 0.42 K/UL (ref 0–0.85)
MONOCYTES NFR BLD AUTO: 4.6 % (ref 0–13.4)
NEUTROPHILS # BLD AUTO: 6.31 K/UL (ref 2–7.15)
NEUTROPHILS NFR BLD: 69.3 % (ref 44–72)
NITRITE UR QL STRIP.AUTO: POSITIVE
NRBC # BLD AUTO: 0 K/UL
NRBC BLD-RTO: 0 /100 WBC
NUMBER OF RH DOSES IND 8505RD: NORMAL
PH UR STRIP.AUTO: 6.5 [PH] (ref 5–8)
PLATELET # BLD AUTO: 280 K/UL (ref 164–446)
PMV BLD AUTO: 10.3 FL (ref 9–12.9)
PROT UR QL STRIP: NEGATIVE MG/DL
RBC # BLD AUTO: 4.53 M/UL (ref 4.2–5.4)
RBC # URNS HPF: ABNORMAL /HPF
RBC UR QL AUTO: ABNORMAL
RENAL EPI CELLS #/AREA URNS HPF: NEGATIVE /HPF
RH BLD: NORMAL
SP GR UR STRIP.AUTO: 1.01
TRICHOMONAS #/AREA URNS HPF: ABNORMAL /HPF
UROBILINOGEN UR STRIP.AUTO-MCNC: 1 MG/DL
WBC # BLD AUTO: 9.1 K/UL (ref 4.8–10.8)
WBC #/AREA URNS HPF: ABNORMAL /HPF

## 2021-06-09 PROCEDURE — 700111 HCHG RX REV CODE 636 W/ 250 OVERRIDE (IP): Performed by: EMERGENCY MEDICINE

## 2021-06-09 PROCEDURE — 93005 ELECTROCARDIOGRAM TRACING: CPT | Performed by: EMERGENCY MEDICINE

## 2021-06-09 PROCEDURE — 96374 THER/PROPH/DIAG INJ IV PUSH: CPT

## 2021-06-09 PROCEDURE — 36415 COLL VENOUS BLD VENIPUNCTURE: CPT

## 2021-06-09 PROCEDURE — 85025 COMPLETE CBC W/AUTO DIFF WBC: CPT

## 2021-06-09 PROCEDURE — 84702 CHORIONIC GONADOTROPIN TEST: CPT

## 2021-06-09 PROCEDURE — 86901 BLOOD TYPING SEROLOGIC RH(D): CPT

## 2021-06-09 PROCEDURE — 700105 HCHG RX REV CODE 258: Performed by: EMERGENCY MEDICINE

## 2021-06-09 PROCEDURE — 76815 OB US LIMITED FETUS(S): CPT

## 2021-06-09 PROCEDURE — 700101 HCHG RX REV CODE 250: Performed by: EMERGENCY MEDICINE

## 2021-06-09 PROCEDURE — 81001 URINALYSIS AUTO W/SCOPE: CPT

## 2021-06-09 PROCEDURE — 99285 EMERGENCY DEPT VISIT HI MDM: CPT

## 2021-06-09 RX ORDER — SODIUM CHLORIDE, SODIUM LACTATE, POTASSIUM CHLORIDE, CALCIUM CHLORIDE 600; 310; 30; 20 MG/100ML; MG/100ML; MG/100ML; MG/100ML
1000 INJECTION, SOLUTION INTRAVENOUS ONCE
Status: COMPLETED | OUTPATIENT
Start: 2021-06-09 | End: 2021-06-09

## 2021-06-09 RX ORDER — CEFDINIR 300 MG/1
300 CAPSULE ORAL 2 TIMES DAILY
Qty: 14 CAPSULE | Refills: 0 | Status: SHIPPED | OUTPATIENT
Start: 2021-06-09 | End: 2021-06-16

## 2021-06-09 RX ADMIN — SODIUM CHLORIDE, POTASSIUM CHLORIDE, SODIUM LACTATE AND CALCIUM CHLORIDE 1000 ML: 600; 310; 30; 20 INJECTION, SOLUTION INTRAVENOUS at 10:18

## 2021-06-09 RX ADMIN — CEFTRIAXONE SODIUM 2 G: 10 INJECTION, POWDER, FOR SOLUTION INTRAVENOUS at 12:10

## 2021-06-09 ASSESSMENT — FIBROSIS 4 INDEX: FIB4 SCORE: 0.42

## 2021-06-09 NOTE — ED NOTES
Chief Complaint   Patient presents with   • Syncope     Per report pt collapsed while at work today. No trauma. ~4 months pregnant, . Pt reports BRB 2 days ago with lower abd cramping.     /55   Pulse 74   Temp 36.7 °C (98.1 °F) (Temporal)   Resp 16   Ht 1.524 m (5')   Wt 59.9 kg (132 lb)   LMP  (LMP Unknown)   SpO2 100%   BMI 25.78 kg/m²     Pt brought in by [employer] from work to GR 30, mask in place. Pt in a gown and on monitor. Chart flagged for ERP to see.    PIV established,  pta.

## 2021-06-09 NOTE — ED NOTES
Discharge orders received, IV and monitor discontinued, instructions and education given, follow-up discussed, prescription x1 sent to pharmacy, pt verbalized understanding.

## 2021-06-09 NOTE — ED PROVIDER NOTES
ED Provider Note    CHIEF COMPLAINT  Chief Complaint   Patient presents with   • Syncope     Per report pt collapsed while at work today. No trauma. ~4 months pregnant, . Pt reports BRB 2 days ago with lower abd cramping.       HPI  Shae Peguero is a 26 y.o. female who presents after syncope at work. Occurred while standing.  She had been standing for about an hour.  Working physically.  Started to feel lightheaded.  Things began to close and around her and she collapsed to the ground.  She denies any injury from the fall.  She feels generally tired currently.  She had not eating or drinking very much this morning.  She reports that just prior to the onset/episode she had a cramp in her lower abdomen.  No ongoing abdominal pain.    REVIEW OF SYSTEMS  As per HPI, otherwise a 10 point review of systems is negative    PAST MEDICAL HISTORY  Past Medical History:   Diagnosis Date   • Urinary tract infection        SOCIAL HISTORY  Social History     Tobacco Use   • Smoking status: Never Smoker   • Smokeless tobacco: Never Used   Vaping Use   • Vaping Use: Never used   Substance Use Topics   • Alcohol use: Never   • Drug use: Never       SURGICAL HISTORY  History reviewed. No pertinent surgical history.    CURRENT MEDICATIONS  Home Medications     Reviewed by Lashay Che R.N. (Registered Nurse) on 21 at 0855  Med List Status: Not Addressed   Medication Last Dose Status   acetaminophen (TYLENOL) 500 MG Tab Not taking Active   Prenatal MV-Min-Fe Fum-FA-DHA (PRENATAL 1 PO)  Active                ALLERGIES  No Known Allergies    PHYSICAL EXAM  VITAL SIGNS: /57   Pulse 71   Temp 36.7 °C (98.1 °F) (Temporal)   Resp 16   Ht 1.524 m (5')   Wt 59.9 kg (132 lb)   LMP  (LMP Unknown)   SpO2 100%   BMI 25.78 kg/m²    Constitutional: Awake and alert  HENT: Normal inspection  Eyes: Normal inspection  Neck: Grossly normal range of motion.  Cardiovascular: Normal heart rate, Normal rhythm.  Symmetric  peripheral pulses.   Thorax & Lungs: No respiratory distress, No wheezing, No rales, No rhonchi, No chest tenderness.   Abdomen: Bowel sounds normal, soft, non-distended, nontender, no mass  Skin: No obvious rash.  Back: No tenderness, No CVA tenderness.   Extremities: No clubbing, cyanosis, edema, no Homans or cords.  Neurologic: Grossly normal   Psychiatric: Normal for situation    RADIOLOGY/PROCEDURES  US-OB LIMITED TRANSABDOMINAL   Final Result      1.  Single live intrauterine pregnancy of an estimated gestational age of 14 weeks 2 days with an estimated date of delivery of 12/12/2021.              Imaging is interpreted by radiologist    Labs:  Results for orders placed or performed during the hospital encounter of 06/09/21   CBC WITH DIFFERENTIAL   Result Value Ref Range    WBC 9.1 4.8 - 10.8 K/uL    RBC 4.53 4.20 - 5.40 M/uL    Hemoglobin 12.8 12.0 - 16.0 g/dL    Hematocrit 37.8 37.0 - 47.0 %    MCV 83.4 81.4 - 97.8 fL    MCH 28.3 27.0 - 33.0 pg    MCHC 33.9 33.6 - 35.0 g/dL    RDW 46.4 35.9 - 50.0 fL    Platelet Count 280 164 - 446 K/uL    MPV 10.3 9.0 - 12.9 fL    Neutrophils-Polys 69.30 44.00 - 72.00 %    Lymphocytes 23.20 22.00 - 41.00 %    Monocytes 4.60 0.00 - 13.40 %    Eosinophils 1.80 0.00 - 6.90 %    Basophils 0.80 0.00 - 1.80 %    Immature Granulocytes 0.30 0.00 - 0.90 %    Nucleated RBC 0.00 /100 WBC    Neutrophils (Absolute) 6.31 2.00 - 7.15 K/uL    Lymphs (Absolute) 2.11 1.00 - 4.80 K/uL    Monos (Absolute) 0.42 0.00 - 0.85 K/uL    Eos (Absolute) 0.16 0.00 - 0.51 K/uL    Baso (Absolute) 0.07 0.00 - 0.12 K/uL    Immature Granulocytes (abs) 0.03 0.00 - 0.11 K/uL    NRBC (Absolute) 0.00 K/uL   RH TYPE FOR RHOGAM FROM E.D.   Result Value Ref Range    Emergency Department Rh Typing POS     Number Of Rh Doses Indicated ZERO    HCG QUANTITATIVE SERUM   Result Value Ref Range    Bhcg 98126.0 (H) 0.0 - 5.0 mIU/mL   URINALYSIS    Specimen: Urine   Result Value Ref Range    Color Yellow     Character  Cloudy (A)     Specific Gravity 1.015 <1.035    Ph 6.5 5.0 - 8.0    Glucose Negative Negative mg/dL    Ketones Negative Negative mg/dL    Protein Negative Negative mg/dL    Bilirubin Negative Negative    Urobilinogen, Urine 1.0 Negative    Nitrite Positive (A) Negative    Leukocyte Esterase Moderate (A) Negative    Occult Blood Small (A) Negative    Micro Urine Req Microscopic    URINE MICROSCOPIC (W/UA)   Result Value Ref Range    WBC 20-50 (A) /hpf    RBC 2-5 (A) /hpf    Bacteria Many (A) None /hpf    Epithelial Cells Moderate (A) /hpf    Epithelial Cells Renal Negative /hpf    Hyaline Cast 0-2 /lpf    Trichomonas Few (A) None /hpf   EKG   Result Value Ref Range    Report       [facility] Emergency Dept.    Test Date:  2021  Pt Name:    JYOTI KRISHNA               Department: ER  MRN:        [id number]                      Room:       [facility]  Gender:     Female                       Technician: [id number]  :        1995                   Requested By:ER TRIAGE PROTOCOL  Order #:    [id number]                    Reading MD:    Measurements  Intervals                                Axis  Rate:       73                           P:          6  IL:         164                          QRS:        83  QRSD:       78                           T:          13  QT:         392  QTc:        432    Interpretive Statements  SINUS RHYTHM  BORDERLINE T ABNORMALITIES, ANTERIOR LEADS  No previous ECG available for comparison         Medications   cefTRIAXone (Rocephin) syringe 2 g (has no administration in time range)   LR infusion (bolus) (1,000 mL Intravenous New Bag 21 1018)       HYDRATION: Based on the patient's presentation of Dehydration the patient was given IV fluids. IV Hydration was used because oral hydration was not adequate alone. Upon recheck following hydration, the patient was improved.    COURSE & MEDICAL DECISION MAKING  Patient presents after syncopal episode.  Vital signs are  normal.  Has not been drinking and may be dehydrated.  Ordered IV fluid bolus.  EKG without preexcitation.  She is currently pregnant with reports of vaginal bleeding.  Work-up was undertaken.    Data returned as noted.  Patient has a UTI likely contributing.  tx with ceftriaxone in ED.  Script for omnicef.  Patient to f/u with OB asap.  Return for pain, bleeding, recurrent events or concern      FINAL IMPRESSION  1. Syncope  2. UTI  3 14 week pregnancy  4. Threatened miscarriage       This dictation was created using voice recognition software. The accuracy of the dictation is limited to the abilities of the software.  The nursing notes were reviewed and certain aspects of this information were incorporated into this note.      Electronically signed by: Dequan Linares M.D., 6/9/2021 9:37 AM

## 2021-07-19 ENCOUNTER — APPOINTMENT (OUTPATIENT)
Dept: RADIOLOGY | Facility: MEDICAL CENTER | Age: 26
End: 2021-07-19
Attending: NURSE PRACTITIONER

## 2021-07-19 ENCOUNTER — HOSPITAL ENCOUNTER (INPATIENT)
Facility: MEDICAL CENTER | Age: 26
LOS: 1 days | End: 2021-07-19
Attending: OBSTETRICS & GYNECOLOGY | Admitting: OBSTETRICS & GYNECOLOGY

## 2021-07-19 VITALS
OXYGEN SATURATION: 97 % | WEIGHT: 132 LBS | BODY MASS INDEX: 25.91 KG/M2 | TEMPERATURE: 98 F | RESPIRATION RATE: 14 BRPM | HEIGHT: 60 IN | SYSTOLIC BLOOD PRESSURE: 90 MMHG | DIASTOLIC BLOOD PRESSURE: 54 MMHG | HEART RATE: 86 BPM

## 2021-07-19 PROBLEM — A74.9 CHLAMYDIA INFECTION AFFECTING PREGNANCY: Status: RESOLVED | Noted: 2020-09-01 | Resolved: 2021-07-19

## 2021-07-19 PROBLEM — O09.899 RUBELLA NON-IMMUNE STATUS, ANTEPARTUM: Status: RESOLVED | Noted: 2020-10-14 | Resolved: 2021-07-19

## 2021-07-19 PROBLEM — O98.819 CHLAMYDIA INFECTION AFFECTING PREGNANCY: Status: RESOLVED | Noted: 2020-09-01 | Resolved: 2021-07-19

## 2021-07-19 PROBLEM — Z28.39 RUBELLA NON-IMMUNE STATUS, ANTEPARTUM: Status: RESOLVED | Noted: 2020-10-14 | Resolved: 2021-07-19

## 2021-07-19 PROBLEM — Z34.80 SUPERVISION OF OTHER NORMAL PREGNANCY, ANTEPARTUM: Status: RESOLVED | Noted: 2020-09-25 | Resolved: 2021-07-19

## 2021-07-19 PROBLEM — O03.9 ABORTION, COMPLETE: Status: ACTIVE | Noted: 2021-07-19

## 2021-07-19 LAB
ABO GROUP BLD: NORMAL
BASOPHILS # BLD AUTO: 0.5 % (ref 0–1.8)
BASOPHILS # BLD: 0.1 K/UL (ref 0–0.12)
BLD GP AB SCN SERPL QL: NORMAL
EOSINOPHIL # BLD AUTO: 0.04 K/UL (ref 0–0.51)
EOSINOPHIL NFR BLD: 0.2 % (ref 0–6.9)
ERYTHROCYTE [DISTWIDTH] IN BLOOD BY AUTOMATED COUNT: 43.5 FL (ref 35.9–50)
HBV SURFACE AG SER QL: ABNORMAL
HCT VFR BLD AUTO: 29.1 % (ref 37–47)
HCV AB SER QL: ABNORMAL
HGB BLD-MCNC: 9.7 G/DL (ref 12–16)
HIV 1+2 AB+HIV1 P24 AG SERPL QL IA: NORMAL
HOLDING TUBE BB 8507: NORMAL
IMM GRANULOCYTES # BLD AUTO: 0.18 K/UL (ref 0–0.11)
IMM GRANULOCYTES NFR BLD AUTO: 1 % (ref 0–0.9)
LYMPHOCYTES # BLD AUTO: 2.61 K/UL (ref 1–4.8)
LYMPHOCYTES NFR BLD: 14.2 % (ref 22–41)
MCH RBC QN AUTO: 27.6 PG (ref 27–33)
MCHC RBC AUTO-ENTMCNC: 33.3 G/DL (ref 33.6–35)
MCV RBC AUTO: 82.7 FL (ref 81.4–97.8)
MONOCYTES # BLD AUTO: 0.73 K/UL (ref 0–0.85)
MONOCYTES NFR BLD AUTO: 4 % (ref 0–13.4)
NEUTROPHILS # BLD AUTO: 14.77 K/UL (ref 2–7.15)
NEUTROPHILS NFR BLD: 80.1 % (ref 44–72)
NRBC # BLD AUTO: 0 K/UL
NRBC BLD-RTO: 0 /100 WBC
PATHOLOGY CONSULT NOTE: NORMAL
PLATELET # BLD AUTO: 302 K/UL (ref 164–446)
PMV BLD AUTO: 9.4 FL (ref 9–12.9)
RBC # BLD AUTO: 3.52 M/UL (ref 4.2–5.4)
RH BLD: NORMAL
RUBV AB SER QL: 41.7 IU/ML
TREPONEMA PALLIDUM IGG+IGM AB [PRESENCE] IN SERUM OR PLASMA BY IMMUNOASSAY: ABNORMAL
WBC # BLD AUTO: 18.4 K/UL (ref 4.8–10.8)

## 2021-07-19 PROCEDURE — 86850 RBC ANTIBODY SCREEN: CPT

## 2021-07-19 PROCEDURE — 304965 HCHG RECOVERY SERVICES

## 2021-07-19 PROCEDURE — 99221 1ST HOSP IP/OBS SF/LOW 40: CPT | Performed by: NURSE PRACTITIONER

## 2021-07-19 PROCEDURE — 88300 SURGICAL PATH GROSS: CPT

## 2021-07-19 PROCEDURE — 700111 HCHG RX REV CODE 636 W/ 250 OVERRIDE (IP)

## 2021-07-19 PROCEDURE — 87340 HEPATITIS B SURFACE AG IA: CPT

## 2021-07-19 PROCEDURE — 770002 HCHG ROOM/CARE - OB PRIVATE (112)

## 2021-07-19 PROCEDURE — 96374 THER/PROPH/DIAG INJ IV PUSH: CPT

## 2021-07-19 PROCEDURE — 86803 HEPATITIS C AB TEST: CPT

## 2021-07-19 PROCEDURE — 87389 HIV-1 AG W/HIV-1&-2 AB AG IA: CPT

## 2021-07-19 PROCEDURE — 85025 COMPLETE CBC W/AUTO DIFF WBC: CPT

## 2021-07-19 PROCEDURE — 86901 BLOOD TYPING SEROLOGIC RH(D): CPT

## 2021-07-19 PROCEDURE — 86780 TREPONEMA PALLIDUM: CPT

## 2021-07-19 PROCEDURE — 88305 TISSUE EXAM BY PATHOLOGIST: CPT

## 2021-07-19 PROCEDURE — 700102 HCHG RX REV CODE 250 W/ 637 OVERRIDE(OP): Performed by: NURSE PRACTITIONER

## 2021-07-19 PROCEDURE — 86762 RUBELLA ANTIBODY: CPT

## 2021-07-19 PROCEDURE — 86900 BLOOD TYPING SEROLOGIC ABO: CPT

## 2021-07-19 PROCEDURE — A9270 NON-COVERED ITEM OR SERVICE: HCPCS | Performed by: NURSE PRACTITIONER

## 2021-07-19 PROCEDURE — 36415 COLL VENOUS BLD VENIPUNCTURE: CPT

## 2021-07-19 PROCEDURE — 59409 OBSTETRICAL CARE: CPT

## 2021-07-19 RX ORDER — OXYTOCIN 10 [USP'U]/ML
10 INJECTION, SOLUTION INTRAMUSCULAR; INTRAVENOUS
Status: DISCONTINUED | OUTPATIENT
Start: 2021-07-19 | End: 2021-07-19 | Stop reason: HOSPADM

## 2021-07-19 RX ORDER — ACETAMINOPHEN 325 MG/1
650 TABLET ORAL EVERY 4 HOURS PRN
Status: DISCONTINUED | OUTPATIENT
Start: 2021-07-19 | End: 2021-07-19 | Stop reason: HOSPADM

## 2021-07-19 RX ORDER — ALUMINA, MAGNESIA, AND SIMETHICONE 2400; 2400; 240 MG/30ML; MG/30ML; MG/30ML
30 SUSPENSION ORAL EVERY 6 HOURS PRN
Status: DISCONTINUED | OUTPATIENT
Start: 2021-07-19 | End: 2021-07-19 | Stop reason: HOSPADM

## 2021-07-19 RX ORDER — IBUPROFEN 800 MG/1
800 TABLET ORAL EVERY 8 HOURS PRN
Qty: 30 TABLET | Refills: 0 | Status: ON HOLD | OUTPATIENT
Start: 2021-07-19 | End: 2022-11-08

## 2021-07-19 RX ORDER — ACETAMINOPHEN 325 MG/1
325 TABLET ORAL EVERY 4 HOURS PRN
Status: DISCONTINUED | OUTPATIENT
Start: 2021-07-19 | End: 2021-07-19 | Stop reason: HOSPADM

## 2021-07-19 RX ORDER — HYDROXYZINE 50 MG/1
50 TABLET, FILM COATED ORAL EVERY 6 HOURS PRN
Status: DISCONTINUED | OUTPATIENT
Start: 2021-07-19 | End: 2021-07-19 | Stop reason: HOSPADM

## 2021-07-19 RX ORDER — MISOPROSTOL 200 UG/1
800 TABLET ORAL
Status: COMPLETED | OUTPATIENT
Start: 2021-07-19 | End: 2021-07-19

## 2021-07-19 RX ORDER — MISOPROSTOL 200 UG/1
TABLET ORAL
Status: ACTIVE
Start: 2021-07-19 | End: 2021-07-19

## 2021-07-19 RX ORDER — IBUPROFEN 800 MG/1
800 TABLET ORAL EVERY 8 HOURS PRN
Status: DISCONTINUED | OUTPATIENT
Start: 2021-07-19 | End: 2021-07-19 | Stop reason: HOSPADM

## 2021-07-19 RX ORDER — OXYTOCIN 10 [USP'U]/ML
INJECTION, SOLUTION INTRAMUSCULAR; INTRAVENOUS
Status: COMPLETED
Start: 2021-07-19 | End: 2021-07-19

## 2021-07-19 RX ADMIN — OXYTOCIN: 10 INJECTION, SOLUTION INTRAMUSCULAR; INTRAVENOUS at 01:59

## 2021-07-19 RX ADMIN — IBUPROFEN 800 MG: 800 TABLET, FILM COATED ORAL at 07:27

## 2021-07-19 RX ADMIN — MISOPROSTOL 800 MCG: 200 TABLET ORAL at 02:00

## 2021-07-19 ASSESSMENT — FIBROSIS 4 INDEX: FIB4 SCORE: 0.37

## 2021-07-19 ASSESSMENT — PAIN SCALES - GENERAL: PAINLEVEL: 10 - WORST POSSIBLE PAIN

## 2021-07-19 NOTE — PROGRESS NOTES
"0130- Received report from DMITRY Jackman. POC discussed.     0138- Pt called out and reported, \"something is coming out.\" Head of fetus could be seen. Call out to Unique Arita CNM to bedside.     0140-  of male fetus. APGARS 4, 4, 2, 1 at 1, 5, 10, and 20 minutes. Skin-skin with mother.     0340- Phone call out to . Dinaa from  will notify day team to visit patient later this AM.      0345- Voicemail left for Birth Certificate office.     0355- Derick (OPAL) notified.     0700- Report given to DMITRY Graham. POC discussed.                        "

## 2021-07-19 NOTE — DISCHARGE SUMMARY
Discharge Summary:      Shae Peguero    Admit Date:   2021  Discharge Date:  2021     Admitting diagnosis:  Labor and delivery indication for care or intervention [O75.9]  Discharge Diagnosis: Status post vaginal, spontaneous of 19w5d fetus  Pregnancy Complications: No prenatal care   Tubal Ligation:  N\A        History:  Past Medical History:   Diagnosis Date   • Urinary tract infection      OB History    Para Term  AB Living   2 2 1     1   SAB TAB Ectopic Molar Multiple Live Births           0 1      # Outcome Date GA Lbr Sudeep/2nd Weight Sex Delivery Anes PTL Lv   2 Para 21 19w5d  0 kg () M Vag-Spont None Y FD      Birth Comments: 0.15 g    1 Term 20 39w2d / 00:10 3.265 kg (7 lb 3.2 oz) F Vag-Vacuum None N HUGO        Patient has no known allergies.  Patient Active Problem List    Diagnosis Date Noted   • Postpartum care following vaginal delivery 2021   • , complete 2021        Hospital Course:   26 y.o. , now para 1, was admitted with the above mentioned diagnosis, underwent Active Labor, vaginal, spontaneous. Patient postpartum course was unremarkable, with progressive advancement in diet , ambulation and toleration of oral analgesia. Patient without complaints today and desires discharge.      Vitals:    21 0110 21 0200 21 0800   BP: 110/62  102/58   Pulse: 77  74   Resp:   14   Temp: 36.6 °C (97.8 °F)  36.7 °C (98 °F)   TempSrc: Temporal  Temporal   SpO2: 97%     Weight:  59.9 kg (132 lb)    Height:  1.524 m (5')        Current Facility-Administered Medications   Medication Dose   • MISOPROSTOL 200 MCG PO TABS     • FENTANYL CITRATE (PF) 0.05 MG/ML INJ SOLN (WRAPPED)     • hydrOXYzine HCl (ATARAX) tablet 50 mg  50 mg   • mag hydrox-al hydrox-simeth (MAALOX PLUS ES or MYLANTA DS) suspension 30 mL  30 mL   • oxytocin (PITOCIN) injection 10 Units  10 Units   • ibuprofen (MOTRIN) tablet 800 mg  800 mg   • acetaminophen  (Tylenol) tablet 325 mg  325 mg   • acetaminophen (Tylenol) tablet 650 mg  650 mg       Exam:  Breast Exam: Inspection negative. No nipple discharge or bleeding. No masses or nodularity palpable  Abdomen: Abdomen soft, non-tender. BS normal. No masses,  No organomegaly  Fundus Non Tender: yes  Incision: none  Perineum: perineum intact  Extremity: extremities, peripheral pulses and reflexes normal, no edema, redness or tenderness in the calves or thighs, Homans sign is negative, no sign of DVT, feet normal, good pulses, normal color, temperature and sensation     Labs:  Recent Labs     07/19/21  0647   WBC 18.4*   RBC 3.52*   HEMOGLOBIN 9.7*   HEMATOCRIT 29.1*   MCV 82.7   MCH 27.6   MCHC 33.3*   RDW 43.5   PLATELETCT 302   MPV 9.4        Activity:   Discharge to home  Pelvic Rest x 6 weeks    Assessment:  normal postpartum course  Discharge Assessment: Taking adequate diet and fluids, no heavy bleeding or foul discharge. Voiding without difficulty     Follow up: .[facility] or [facility] in 5 weeks for vaginal      Discharge Meds:   Current Outpatient Medications   Medication Sig Dispense Refill   • ibuprofen (MOTRIN) 800 MG Tab Take 1 tablet by mouth every 8 hours as needed (For cramping after delivery; do not give if patient is receiving ketorolac (Toradol)). 30 tablet 0     Pt need to RT [facility] or ER if any of the following occur:  Fever over 100,5  Severe abd pain  Red streaks or painful masses in the breasts  Foul smelling d/c or lochia  Heavy vaginal bleeding saturating a pad per hour  S/s of PP depression  SS to see patient before discharge  Ibuprofen RX provided  Unknown BCM    CHERYL TaoNBANDAR.

## 2021-07-19 NOTE — PROGRESS NOTES
0104-Pt presents to L&D c/o abdominal pain x2 hours and VB that started right before coming to the hospital. Pt has had no PNC, US in June states SAMUEL of 12/8 = 19.5 weeks GA. Unable to locate FHR. TOCO applied. Significant bright red vaginal bleeding noted  0107-Phoned CHERYL Arita CNM, updated on pt arrival/complaint/status, US ordered  0128-Pt called out, reported a big gush of fluid, bright red bloody fluid noted  0129-Pt transferred to S229. US tech at bedside. Report given to Sailaja ANDRES. POC discussed

## 2021-07-19 NOTE — ED PROVIDER NOTES
Patient was seen in triage for bleeding, but quickly moved to labor room when she was noted to be ruptured and actively laboring  US ordered to determine gestational age    Unique LOPEZM, APRN

## 2021-07-19 NOTE — L&D DELIVERY NOTE
Shae Peguero is a 26 y.o. female    VAGINAL DELIVERY NOTE:    OB History    Para Term  AB Living   2 1 1     1   SAB TAB Ectopic Molar Multiple Live Births           0 1      # Outcome Date GA Lbr Sudeep/2nd Weight Sex Delivery Anes PTL Lv   2 Current            1 Term 20 39w2d / 00:10 3.265 kg (7 lb 3.2 oz) F Vag-Vacuum None N HUGO       Weeks gestation: 19w5d  Diagnosis: SAB in process  GBS: unknown    Shae was admitted on the morning of 2021 with complaints of bleeding and abdominal pain since about 2300. She has had no prenatal care, and RN was unable to find FHT's. US was called for urgent imaging, and no fetus was identified in utero. Shortly after, RN called out saying that the fetus was delivering. Fetal breech up to upper back had delivered, and I delivered the fetal head and placenta. Fetus was alive at time of birth and was noted to be gasping and moving all extremities.     of viable male over a intact perineum. Nuchal cord present: no. Shoulders delivered easily.  4, 4, 2, 1 at 1, 5, 10, and 20 minutes respectively. Baby was on maternal chest and still with a slow pulse at the end of the procedure  Birth weight: pending at this time    Placenta: spontaneous and intact with 3 VC    Laceration: none    Anesthesia: None  Excellent hemostasis  EBL: 350 ml. W[place] cytotec and IM pitocin were given due to lack of IV access.    Sponge and needle counts correct: yes    Tolerated procedure well  Will collect labs while in patient, and after recovery is completed, will be discharged to home      Unique LOPEZM, APRN  Dr Delgado is the attending MD today

## 2021-07-19 NOTE — PROGRESS NOTES
0700 -  Report received from Sailaja DE SANTIAGO RN.   S/p FD.     Patient is in bed sleeping - awake to name, assessment completed. Ibuprofen administered for mild cramping. Patient back to sleep before RN left the room. FOB is awake sitting on the couch at the .     Patient/FOB deny questions regarding care since arrival to [facility]. Food ordered from the [facility] kitchen for the patient and tray requested for the FOB.     Birth certificates called to confirm the gestational age.   Janet DE SANTIAGO From  called with intention to see patient. Patient is currently sleeping.  to see patient in a couple hours.     0800 - Unique MANCIA CNM at the  discussing current status and plan to d/c home today.     1500 - Momento's given to patient and FOB. Patient denies questions regarding care or regarding POC to discharge home. Understands when to f/u with provider, conditions to seek medical attention.     Patient discharged home with specific instruction to return to L&D/Physician ie.. Bleeding/ROM/decreased FM/labor/concerns for self or baby.

## 2021-07-19 NOTE — DISCHARGE PLANNING
Medical Social Work    MSW received a call from nursing staff regarding fetal demise early this morning.  RN states that pt will remain until later in the morning and unit SW could follow up with pt.  MSW provided unit SW with report for follow up.

## 2021-07-19 NOTE — DISCHARGE PLANNING
:    Received a consult to see patient who delivered infant at 19.5 weeks-fetal demise.  SW attempted to see patient, however she is sleeping.  Spoke with RN who stated patient will be discharged today.      SW will check back later to meet with patient.

## 2021-07-19 NOTE — DISCHARGE INSTRUCTIONS
Postpartum Care After Vaginal Delivery  This sheet gives you information about how to care for yourself from the time you deliver your baby to up to 6-12 weeks after delivery (postpartum period). Your health care provider may also give you more specific instructions. If you have problems or questions, contact your health care provider.  Follow these instructions at home:  Vaginal bleeding  · It is normal to have vaginal bleeding (lochia) after delivery. Wear a sanitary pad for vaginal bleeding and discharge.  ? During the first week after delivery, the amount and appearance of lochia is often similar to a menstrual period.  ? Over the next few weeks, it will gradually decrease to a dry, yellow-brown discharge.  ? For most women, lochia stops completely by 4-6 weeks after delivery. Vaginal bleeding can vary from woman to woman.  · Change your sanitary pads frequently. Watch for any changes in your flow, such as:  ? A sudden increase in volume.  ? A change in color.  ? Large blood clots.  · If you pass a blood clot from your vagina, save it and call your health care provider to discuss. Do not flush blood clots down the toilet before talking with your health care provider.  · Do not use tampons or douches until your health care provider says this is safe.  · If you are not breastfeeding, your period should return 6-8 weeks after delivery. If you are feeding your child breast milk only (exclusive breastfeeding), your period may not return until you stop breastfeeding.  Perineal care  · Keep the area between the vagina and the anus (perineum) clean and dry as told by your health care provider. Use medicated pads and pain-relieving sprays and creams as directed.  · You may be given a squirt bottle to use instead of wiping to clean the perineum area after you go to the bathroom. As you start healing, you may use the squirt bottle before wiping yourself. Make sure to wipe gently.  · To relieve pain caused by an episiotomy,  a tear in the vagina, or swollen veins in the anus (hemorrhoids), try taking a warm sitz bath 2-3 times a day. A sitz bath is a warm water bath that is taken while you are sitting down. The water should only come up to your hips and should cover your buttocks.  Breast care  · Within the first few days after delivery, your breasts may feel heavy, full, and uncomfortable (breast engorgement). Milk may also leak from your breasts. Your health care provider can suggest ways to help relieve the discomfort. Breast engorgement should go away within a few days.  · If you are breastfeeding:  ? Wear a bra that supports your breasts and fits you well.  ? Keep your nipples clean and dry. Apply creams and ointments as told by your health care provider.  ? You may need to use breast pads to absorb milk that leaks from your breasts.  ? You may have uterine contractions every time you breastfeed for up to several weeks after delivery. Uterine contractions help your uterus return to its normal size.  ? If you have any problems with breastfeeding, work with your health care provider or lactation consultant.  · If you are not breastfeeding:  ? Avoid touching your breasts a lot. Doing this can make your breasts produce more milk.  ? Wear a good-fitting bra and use cold packs to help with swelling.  ? Do not squeeze out (express) milk. This causes you to make more milk.  Intimacy and sexuality  · Ask your health care provider when you can engage in sexual activity. This may depend on:  ? Your risk of infection.  ? How fast you are healing.  ? Your comfort and desire to engage in sexual activity.  · You are able to get pregnant after delivery, even if you have not had your period. If desired, talk with your health care provider about methods of birth control (contraception).  Medicines  · Take over-the-counter and prescription medicines only as told by your health care provider.  · If you were prescribed an antibiotic medicine, take it  as told by your health care provider. Do not stop taking the antibiotic even if you start to feel better.  Activity  · Gradually return to your normal activities as told by your health care provider. Ask your health care provider what activities are safe for you.  · Rest as much as possible. Try to rest or take a nap while your baby is sleeping.  Eating and drinking    · Drink enough fluid to keep your urine pale yellow.  · Eat high-fiber foods every day. These may help prevent or relieve constipation. High-fiber foods include:  ? Whole grain cereals and breads.  ? Brown rice.  ? Beans.  ? Fresh fruits and vegetables.  · Do not try to lose weight quickly by cutting back on calories.  · Take your prenatal vitamins until your postpartum checkup or until your health care provider tells you it is okay to stop.  Lifestyle  · Do not use any products that contain nicotine or tobacco, such as cigarettes and e-cigarettes. If you need help quitting, ask your health care provider.  · Do not drink alcohol, especially if you are breastfeeding.  General instructions  · Keep all follow-up visits for you and your baby as told by your health care provider. Most women visit their health care provider for a postpartum checkup within the first 3-6 weeks after delivery.  Contact a health care provider if:  · You feel unable to cope with the changes that your child brings to your life, and these feelings do not go away.  · You feel unusually sad or worried.  · Your breasts become red, painful, or hard.  · You have a fever.  · You have trouble holding urine or keeping urine from leaking.  · You have little or no interest in activities you used to enjoy.  · You have not  at all and you have not had a menstrual period for 12 weeks after delivery.  · You have stopped breastfeeding and you have not had a menstrual period for 12 weeks after you stopped breastfeeding.  · You have questions about caring for yourself or your baby.  · You  pass a blood clot from your vagina.  Get help right away if:  · You have chest pain.  · You have difficulty breathing.  · You have sudden, severe leg pain.  · You have severe pain or cramping in your lower abdomen.  · You bleed from your vagina so much that you fill more than one sanitary pad in one hour. Bleeding should not be heavier than your heaviest period.  · You develop a severe headache.  · You faint.  · You have blurred vision or spots in your vision.  · You have bad-smelling vaginal discharge.  · You have thoughts about hurting yourself or your baby.  If you ever feel like you may hurt yourself or others, or have thoughts about taking your own life, get help right away. You can go to the nearest emergency department or call:  · Your local emergency services (911 in the [place]  · A suicide crisis helpline, such as the National Suicide Prevention Lifeline at 1-206.918.5028. This is open 24 hours a day.  Summary  · The period of time right after you deliver your  up to 6-12 weeks after delivery is called the postpartum period.  · Gradually return to your normal activities as told by your health care provider.  · Keep all follow-up visits for you and your baby as told by your health care provider.  This information is not intended to replace advice given to you by your health care provider. Make sure you discuss any questions you have with your health care provider.  Document Released: 10/14/2008 Document Revised: 2018 Document Reviewed: 10/01/2018  Elsevier Patient Education ©  [employer]

## 2021-07-19 NOTE — CARE PLAN
Problem: Knowledge Deficit - L&D  Goal: Patient and family/caregivers will demonstrate understanding of plan of care, disease process/condition, diagnostic tests and medications  Outcome: Progressing     Problem: Psychosocial - L&D  Goal: Patient's level of anxiety will decrease  Outcome: Met  Goal: Patient will be able to discuss coping skills during hospitalization  Outcome: Progressing  Goal: Patient's ability to re-evaluate and adapt role responsibilities will improve  Outcome: Progressing     Problem: Risk for Venous Thromboembolism (VTE)  Goal: VTE prevention measures will be implemented and patient will remain free from VTE  Outcome: Progressing     Problem: Risk for Injury  Goal: Patient and fetus will be free of preventable injury/complications  Outcome: Progressing     Problem: Pain  Goal: Patient's pain will be alleviated or reduced to the patient’s comfort goal  Outcome: Progressing

## 2021-07-19 NOTE — H&P
History and Physical    Shae Peguero is a 26 y.o. female  -Para:     Gestational Age:  19w5d  Admitted for:   pain management  Admitted to  [facility] Labor and Delivery.  Patient received prenatal care: No prenatal care    HPI: Patient is admitted with the above mentioned Chief Complaint and States   Loss of fluid:   Positive at 0128 with bloody fluid  Abdominal Pain:  negative  Uterine Contractions:  Positive since 2300  Vaginal Bleeding:  Positive since 2300  Fetal Movement:  None- hadn't felt yet  Patient denies fever, chills, nausea, vomiting , headache, visual disturbance, or dysuria  No LMP recorded (lmp unknown). Patient is pregnant.  Estimated Date of Delivery: 21  Final SAMUEL: 2021, by Patient Reported    Patient Active Problem List    Diagnosis Date Noted   • Rubella non-immune status 10/14/2020   • Supervision of other normal pregnancy, antepartum 2020   • Chlamydia infection affecting pregnancy 2020       Admitting DX: Pregnancy   Pregnancy Complications:  premature labor  OB Risk Factors:   No prenatal care  Labor State:    Significant vaginal bleeding due to expected abruption. and advanced cervical dilation    History:   has a past medical history of Urinary tract infection. She also has no past medical history of Addisons disease ([facility]), Adrenal disorder ([facility]), Allergy, Anemia, Anxiety, Arrhythmia, Arthritis, Asthma, Blood transfusion without reported diagnosis, Cancer ([facility]), Cataract, CHF (congestive heart failure) ([facility]), Clotting disorder ([facility]), COPD (chronic obstructive pulmonary disease) ([facility]), Cushings syndrome ([facility]), Depression, Diabetes ([facility]), Diabetic neuropathy ([facility]), GERD (gastroesophageal reflux disease), Glaucoma, Goiter, Head ache, Heart attack ([facility]), Heart murmur, HIV (human immunodeficiency virus infection) ([facility]), Hyperlipidemia, Hypertension, IBD (inflammatory bowel disease), Kidney disease, Meningitis, Migraine, Muscle disorder, Osteoporosis,  Parathyroid disorder (HCC), Pituitary disease (HCC), Pulmonary emphysema (HCC), Seizure (HCC), Sickle cell disease (HCC), Stroke (HCC), Substance abuse (HCC), Thyroid disease, or Tuberculosis.     has no past surgical history on file.    OB History    Para Term  AB Living   2 1 1     1   SAB TAB Ectopic Molar Multiple Live Births           0 1      # Outcome Date GA Lbr Sudeep/2nd Weight Sex Delivery Anes PTL Lv   2 Current            1 Term 20 39w2d / 00:10 3.265 kg (7 lb 3.2 oz) F Vag-Vacuum None N HUOG       Medications:  No current facility-administered medications on file prior to encounter.     Current Outpatient Medications on File Prior to Encounter   Medication Sig Dispense Refill   • Prenatal MV-Min-Fe Fum-FA-DHA (PRENATAL 1 PO) Take  by mouth.     • acetaminophen (TYLENOL) 500 MG Tab Take 500-1,000 mg by mouth every 6 hours as needed.         Allergies:  Patient has no known allergies.    ROS:   Neuro: negative    Cardiovascular: negative  Gastro intestinal: negative  Genitourinary: negative            Physical Exam:  /62   Pulse 77   Temp 36.6 °C (97.8 °F) (Temporal)   LMP  (LMP Unknown)   SpO2 97%   Constitutional: healthy-appearing, Well-developed, well-nourished, in moderately distress  No JVD: while supine  HEENT: EOMI  Breast Exam: Inspection negative. No nipple discharge or bleeding. No masses or nodularity palpable  Cardio: regular rate and rhythm  Lung: unlabored respirations, no intercostal retractions or accessory muscle use, clear to auscultation without rales or wheezes  Abdomen: abdomen is soft without significant tenderness, masses, organomegaly or guarding  Extremity: extremities, peripheral pulses and reflexes normal, no edema, redness or tenderness in the calves or thighs, Homans sign is negative, no sign of DVT, feet normal, good pulses, normal color, temperature and sensation    Cervical Exam: 100%  Cervix Dilatation: 10  Station: positive 4 (on arrival to  room, fetal breech had been delivered, and vertex was still in vagina  Pelvis: Adequate  Fetal Assessment: No FHR able to be obtained, fetus in vaginal vault  Estimated Fetal Weight: Less than 1500g      Labs:      Prenatal Results     General (Most Recent Result)     Test Value Date Time    ABO  O  10/12/20 1030    Rh  POS  10/12/20 1030    Antibody screen  NEG  10/12/20 1030    HbA1c       Chlamydia by PCR  Negative  12/23/20 1620    Gonorrhea by PCR  Negative  12/23/20 1620    RPR/Syphilus  Non-Reactive  10/12/20 1021    HSV 1/2 by PCR (non-serum)       HSV 1/2 (serum)       HSV 1       HSV 2       HPV (16)       HBsAg  Non-Reactive  10/12/20 1021    HIV-1 HIV-2 Antibodies  Non-Reactive  10/12/20 1021    Rubella  6.98 IU/mL 10/12/20 1021    Tb             Pap Smear (Most Recent Result)     Test Value Date Time    Pap smear       Pap smear w/HPV       Pap smear w/CTNG       Pap smar w/HPV CTNG       Pap smear (reflex HPV ACUS)       Pap smear (reflex HPV ASCUS w/CTNG)   Abnormal    (See Report)   08/28/20 1033    Pathology gyn specimen  (See Report)   08/28/20 1033          Urinalysis (Most Recent Result)     Test Value Date Time    Urinalysis   Abnormal    (See Report)   06/09/21 0915    POC urinalysis  (See Report)   08/28/20 1007    Urine drug screen (w/o conf)  (See Report)   10/12/20 1021    Urine culture ([id number])       Urine culture ([id number])   Abnormal    (See Report)   06/24/20 0850    Urine Protein/Creatinine Ratio             Urinalysis, Culture if indicated     Test Value Date Time    Color  Yellow  06/09/21 0915    Appearance  Cloudy  06/09/21 0915    Specific Gravity  1.015  06/09/21 0915    PH  6.5  06/09/21 0915    Glucose  Negative mg/dL 06/09/21 0915    Ketones  Negative mg/dL 06/09/21 0915    Protein  Negative mg/dL 06/09/21 0915    Bilirubin  Negative  06/09/21 0915    Nitrites  Positive  06/09/21 0915    Leukocytes Esterase  Moderate  06/09/21 0915    Blood  Small  06/09/21 0915     Comment  Microscopic  06/09/21 0915    Culture             Urine Drug Screen     Test Value Date Time    Amphetamines  Negative  12/23/20 1620    Barbiturates  Negative  12/23/20 1620    Benzodiazepines  Negative  12/23/20 1620    Cocaine  Negative  12/23/20 1620    Methadone  Negative  12/23/20 1620    Opiates  Negative  12/23/20 1620    Oxycodone  Negative  12/23/20 1620    Phencylidine  Negative  12/23/20 1620    Propoxyphene  Negative  12/23/20 1620    Marijuana Metabolite  Negative  12/23/20 1620          1st Trimester     Test Value Date Time    Hgb       Hct       Fasting Glucose Tolerance       GTT, 1 hour       GTT, 2 hours       GTT, 3 hours             2nd Trimester     Test Value Date Time    Hgb  12.8 g/dL 06/09/21 0930    Hct  37.8 % 06/09/21 0930    AST       ALT       Uric Acid       Fasting Glucose Tolerance       GTT, 1 hour       GTT, 2 hours       GTT, 3 hours             3rd Trimester     Test Value Date Time    Hgb       Hct       Platelet count       GBS (SALTER BROTH)       Fasting Glucose Tolerance       GTT, 1 hour       GTT, 2 hous       GTT, 3hours             Congenital Disease Screening     Test Value Date Time    First Trimester Screen       Quad Screen       BH Electrophoresis       Cystic Fibrosis Carrier Study       SMA       AFP Maternal Serum        AFP Tetra       NIPT             Legend    ^: Historical                          Assessment:  Gestational Age:  19w5d  Labor State:   SAB in process  Risk Factors:   No prenatal care  Pregnancy Complications: No prenatal care    Patient Active Problem List    Diagnosis Date Noted   • Rubella non-immune status 10/14/2020   • Supervision of other normal pregnancy, antepartum 09/25/2020   • Chlamydia infection affecting pregnancy 09/01/2020       Plan:   Admitted for: Active Labor/SAB  SAB  CBC  routine urinalysis  Antibiotics: Not indicated at this time  Cytotec 800 mcg MO for placenta  IM pitocin for vaginal bleeding    Unique Arita,  C.N.CARISA.    Dr Delgado is the attending

## 2021-07-19 NOTE — DISCHARGE PLANNING
:    KELECHI met with patient's -Matias Peguero ([phone]).  Pt was still sleeping.  KELECHI provided Matias with grief packet: mortuary information, support groups, and counseling resources.  KELECHI discussed options of choosing a mortuary for cremation or burial.  Explained that most mortuaries provided cremation for free.  Matias stated he needed to go home and talk to their family about what to do.  He asked that he let us know tomorrow which mortuary they select.  KELECHI confirmed Matias's phone number and address and provided him with KELECHI's phone number and e-mail.  No further needs at this time.  KELECHI updated DMITRY-Santos.

## 2021-07-21 NOTE — DISCHARGE PLANNING
:    Message left for Mariely Peguero at [phone] to find out which mortuary parents selected.

## 2022-11-04 ENCOUNTER — HOSPITAL ENCOUNTER (EMERGENCY)
Facility: MEDICAL CENTER | Age: 27
End: 2022-11-04

## 2022-11-04 ENCOUNTER — APPOINTMENT (OUTPATIENT)
Dept: RADIOLOGY | Facility: MEDICAL CENTER | Age: 27
DRG: 831 | End: 2022-11-04
Attending: ADVANCED PRACTICE MIDWIFE
Payer: MEDICAID

## 2022-11-04 ENCOUNTER — HOSPITAL ENCOUNTER (INPATIENT)
Facility: MEDICAL CENTER | Age: 27
LOS: 3 days | DRG: 831 | End: 2022-11-08
Attending: OBSTETRICS & GYNECOLOGY | Admitting: OBSTETRICS & GYNECOLOGY
Payer: MEDICAID

## 2022-11-04 VITALS
OXYGEN SATURATION: 99 % | TEMPERATURE: 98.4 F | RESPIRATION RATE: 16 BRPM | SYSTOLIC BLOOD PRESSURE: 106 MMHG | WEIGHT: 120.59 LBS | DIASTOLIC BLOOD PRESSURE: 67 MMHG | BODY MASS INDEX: 23.55 KG/M2 | HEART RATE: 92 BPM

## 2022-11-04 LAB
ABO GROUP BLD: NORMAL
AMPHET UR QL SCN: NEGATIVE
ANISOCYTOSIS BLD QL SMEAR: ABNORMAL
APPEARANCE UR: ABNORMAL
BACTERIA #/AREA URNS HPF: ABNORMAL /HPF
BARBITURATES UR QL SCN: NEGATIVE
BASOPHILS # BLD AUTO: 0.7 % (ref 0–1.8)
BASOPHILS # BLD: 0.09 K/UL (ref 0–0.12)
BENZODIAZ UR QL SCN: NEGATIVE
BILIRUB UR QL STRIP.AUTO: NEGATIVE
BLD GP AB SCN SERPL QL: NORMAL
BZE UR QL SCN: NEGATIVE
CANNABINOIDS UR QL SCN: NEGATIVE
COLOR UR: YELLOW
DACRYOCYTES BLD QL SMEAR: NORMAL
EOSINOPHIL # BLD AUTO: 0.14 K/UL (ref 0–0.51)
EOSINOPHIL NFR BLD: 1.1 % (ref 0–6.9)
EPI CELLS #/AREA URNS HPF: ABNORMAL /HPF
ERYTHROCYTE [DISTWIDTH] IN BLOOD BY AUTOMATED COUNT: 44.5 FL (ref 35.9–50)
GLUCOSE UR STRIP.AUTO-MCNC: NEGATIVE MG/DL
HBV SURFACE AG SER QL: ABNORMAL
HCT VFR BLD AUTO: 23.3 % (ref 37–47)
HCV AB SER QL: ABNORMAL
HGB BLD-MCNC: 6.5 G/DL (ref 12–16)
HIV 1+2 AB+HIV1 P24 AG SERPL QL IA: NORMAL
HYPOCHROMIA BLD QL SMEAR: ABNORMAL
IMM GRANULOCYTES # BLD AUTO: 0.32 K/UL (ref 0–0.11)
IMM GRANULOCYTES NFR BLD AUTO: 2.4 % (ref 0–0.9)
KETONES UR STRIP.AUTO-MCNC: NEGATIVE MG/DL
LEUKOCYTE ESTERASE UR QL STRIP.AUTO: ABNORMAL
LYMPHOCYTES # BLD AUTO: 2.41 K/UL (ref 1–4.8)
LYMPHOCYTES NFR BLD: 18.4 % (ref 22–41)
MCH RBC QN AUTO: 17.8 PG (ref 27–33)
MCHC RBC AUTO-ENTMCNC: 27.9 G/DL (ref 33.6–35)
MCV RBC AUTO: 63.7 FL (ref 81.4–97.8)
METHADONE UR QL SCN: NEGATIVE
MICRO URNS: ABNORMAL
MICROCYTES BLD QL SMEAR: ABNORMAL
MONOCYTES # BLD AUTO: 0.73 K/UL (ref 0–0.85)
MONOCYTES NFR BLD AUTO: 5.6 % (ref 0–13.4)
MORPHOLOGY BLD-IMP: NORMAL
NEUTROPHILS # BLD AUTO: 9.38 K/UL (ref 2–7.15)
NEUTROPHILS NFR BLD: 71.8 % (ref 44–72)
NITRITE UR QL STRIP.AUTO: NEGATIVE
NRBC # BLD AUTO: 0.02 K/UL
NRBC BLD-RTO: 0.2 /100 WBC
OPIATES UR QL SCN: NEGATIVE
OVALOCYTES BLD QL SMEAR: NORMAL
OXYCODONE UR QL SCN: NEGATIVE
PCP UR QL SCN: NEGATIVE
PH UR STRIP.AUTO: 6 [PH] (ref 5–8)
PLATELET # BLD AUTO: 300 K/UL (ref 164–446)
PLATELET BLD QL SMEAR: NORMAL
PMV BLD AUTO: 8.8 FL (ref 9–12.9)
POLYCHROMASIA BLD QL SMEAR: NORMAL
PROPOXYPH UR QL SCN: NEGATIVE
PROT UR QL STRIP: 30 MG/DL
RBC # BLD AUTO: 3.66 M/UL (ref 4.2–5.4)
RBC # URNS HPF: ABNORMAL /HPF
RBC BLD AUTO: PRESENT
RBC UR QL AUTO: ABNORMAL
RH BLD: NORMAL
RUBV AB SER QL: 21.6 IU/ML
SP GR UR STRIP.AUTO: 1.02
T PALLIDUM AB SER QL IA: ABNORMAL
UROBILINOGEN UR STRIP.AUTO-MCNC: 1 MG/DL
WBC # BLD AUTO: 13.1 K/UL (ref 4.8–10.8)
WBC #/AREA URNS HPF: ABNORMAL /HPF

## 2022-11-04 PROCEDURE — 86850 RBC ANTIBODY SCREEN: CPT

## 2022-11-04 PROCEDURE — 93005 ELECTROCARDIOGRAM TRACING: CPT | Performed by: ADVANCED PRACTICE MIDWIFE

## 2022-11-04 PROCEDURE — 36415 COLL VENOUS BLD VENIPUNCTURE: CPT

## 2022-11-04 PROCEDURE — 99282 EMERGENCY DEPT VISIT SF MDM: CPT

## 2022-11-04 PROCEDURE — 87186 SC STD MICRODIL/AGAR DIL: CPT

## 2022-11-04 PROCEDURE — 81001 URINALYSIS AUTO W/SCOPE: CPT

## 2022-11-04 PROCEDURE — 86592 SYPHILIS TEST NON-TREP QUAL: CPT

## 2022-11-04 PROCEDURE — 87591 N.GONORRHOEAE DNA AMP PROB: CPT

## 2022-11-04 PROCEDURE — 36430 TRANSFUSION BLD/BLD COMPNT: CPT

## 2022-11-04 PROCEDURE — 86762 RUBELLA ANTIBODY: CPT

## 2022-11-04 PROCEDURE — 87491 CHLMYD TRACH DNA AMP PROBE: CPT

## 2022-11-04 PROCEDURE — 87340 HEPATITIS B SURFACE AG IA: CPT

## 2022-11-04 PROCEDURE — 302449 STATCHG TRIAGE ONLY (STATISTIC)

## 2022-11-04 PROCEDURE — 76805 OB US >/= 14 WKS SNGL FETUS: CPT

## 2022-11-04 PROCEDURE — 87086 URINE CULTURE/COLONY COUNT: CPT

## 2022-11-04 PROCEDURE — 87077 CULTURE AEROBIC IDENTIFY: CPT

## 2022-11-04 PROCEDURE — 86901 BLOOD TYPING SEROLOGIC RH(D): CPT

## 2022-11-04 PROCEDURE — 86780 TREPONEMA PALLIDUM: CPT

## 2022-11-04 PROCEDURE — P9016 RBC LEUKOCYTES REDUCED: HCPCS

## 2022-11-04 PROCEDURE — 86900 BLOOD TYPING SEROLOGIC ABO: CPT

## 2022-11-04 PROCEDURE — 87389 HIV-1 AG W/HIV-1&-2 AB AG IA: CPT

## 2022-11-04 PROCEDURE — 80307 DRUG TEST PRSMV CHEM ANLYZR: CPT

## 2022-11-04 PROCEDURE — 86923 COMPATIBILITY TEST ELECTRIC: CPT

## 2022-11-04 PROCEDURE — 86803 HEPATITIS C AB TEST: CPT

## 2022-11-04 PROCEDURE — 85025 COMPLETE CBC W/AUTO DIFF WBC: CPT

## 2022-11-04 RX ORDER — SODIUM CHLORIDE, SODIUM LACTATE, POTASSIUM CHLORIDE, CALCIUM CHLORIDE 600; 310; 30; 20 MG/100ML; MG/100ML; MG/100ML; MG/100ML
INJECTION, SOLUTION INTRAVENOUS CONTINUOUS
Status: DISCONTINUED | OUTPATIENT
Start: 2022-11-04 | End: 2022-11-04

## 2022-11-04 RX ORDER — MAGNESIUM SULFATE HEPTAHYDRATE 40 MG/ML
2 INJECTION, SOLUTION INTRAVENOUS CONTINUOUS
Status: DISCONTINUED | OUTPATIENT
Start: 2022-11-04 | End: 2022-11-05

## 2022-11-04 RX ORDER — SODIUM CHLORIDE, SODIUM LACTATE, POTASSIUM CHLORIDE, CALCIUM CHLORIDE 600; 310; 30; 20 MG/100ML; MG/100ML; MG/100ML; MG/100ML
INJECTION, SOLUTION INTRAVENOUS CONTINUOUS
Status: DISCONTINUED | OUTPATIENT
Start: 2022-11-04 | End: 2022-11-08

## 2022-11-04 RX ORDER — INDOMETHACIN 50 MG/1
50 CAPSULE ORAL ONCE
Status: COMPLETED | OUTPATIENT
Start: 2022-11-05 | End: 2022-11-05

## 2022-11-04 RX ORDER — BETAMETHASONE SODIUM PHOSPHATE AND BETAMETHASONE ACETATE 3; 3 MG/ML; MG/ML
12 INJECTION, SUSPENSION INTRA-ARTICULAR; INTRALESIONAL; INTRAMUSCULAR; SOFT TISSUE EVERY 24 HOURS
Status: COMPLETED | OUTPATIENT
Start: 2022-11-04 | End: 2022-11-06

## 2022-11-04 RX ORDER — MAGNESIUM SULFATE HEPTAHYDRATE 40 MG/ML
4 INJECTION, SOLUTION INTRAVENOUS ONCE
Status: COMPLETED | OUTPATIENT
Start: 2022-11-04 | End: 2022-11-05

## 2022-11-04 RX ORDER — INDOMETHACIN 50 MG/1
50 CAPSULE ORAL EVERY 6 HOURS
Status: COMPLETED | OUTPATIENT
Start: 2022-11-05 | End: 2022-11-07

## 2022-11-04 RX ORDER — PENICILLIN G POTASSIUM 5000000 [IU]/1
5 INJECTION, POWDER, FOR SOLUTION INTRAMUSCULAR; INTRAVENOUS ONCE
Status: COMPLETED | OUTPATIENT
Start: 2022-11-05 | End: 2022-11-05

## 2022-11-04 RX ORDER — CALCIUM GLUCONATE 94 MG/ML
1 INJECTION, SOLUTION INTRAVENOUS
Status: DISCONTINUED | OUTPATIENT
Start: 2022-11-04 | End: 2022-11-04 | Stop reason: HOSPADM

## 2022-11-04 RX ORDER — SODIUM CHLORIDE 9 MG/ML
INJECTION, SOLUTION INTRAVENOUS
Status: COMPLETED
Start: 2022-11-04 | End: 2022-11-05

## 2022-11-05 PROBLEM — O23.43 UTI (URINARY TRACT INFECTION) DURING PREGNANCY, THIRD TRIMESTER: Status: ACTIVE | Noted: 2022-11-05

## 2022-11-05 PROBLEM — O60.03 PRETERM LABOR IN THIRD TRIMESTER WITHOUT DELIVERY: Status: ACTIVE | Noted: 2022-11-05

## 2022-11-05 LAB
ALBUMIN SERPL BCP-MCNC: 3.5 G/DL (ref 3.2–4.9)
ALBUMIN/GLOB SERPL: 0.9 G/DL
ALP SERPL-CCNC: 82 U/L (ref 30–99)
ALT SERPL-CCNC: <5 U/L (ref 2–50)
ANION GAP SERPL CALC-SCNC: 14 MMOL/L (ref 7–16)
AST SERPL-CCNC: 16 U/L (ref 12–45)
BARCODED ABORH UBTYP: 9500
BARCODED ABORH UBTYP: 9500
BARCODED PRD CODE UBPRD: NORMAL
BARCODED PRD CODE UBPRD: NORMAL
BARCODED UNIT NUM UBUNT: NORMAL
BARCODED UNIT NUM UBUNT: NORMAL
BILIRUB SERPL-MCNC: 1 MG/DL (ref 0.1–1.5)
BUN SERPL-MCNC: 7 MG/DL (ref 8–22)
C TRACH DNA SPEC QL NAA+PROBE: POSITIVE
CALCIUM SERPL-MCNC: 7.8 MG/DL (ref 8.5–10.5)
CHLORIDE SERPL-SCNC: 103 MMOL/L (ref 96–112)
CO2 SERPL-SCNC: 18 MMOL/L (ref 20–33)
COMPONENT R 8504R: NORMAL
COMPONENT R 8504R: NORMAL
CREAT SERPL-MCNC: 0.35 MG/DL (ref 0.5–1.4)
ERYTHROCYTE [DISTWIDTH] IN BLOOD BY AUTOMATED COUNT: 56.4 FL (ref 35.9–50)
ERYTHROCYTE [DISTWIDTH] IN BLOOD BY AUTOMATED COUNT: 57.3 FL (ref 35.9–50)
FERRITIN SERPL-MCNC: 4.7 NG/ML (ref 10–291)
GFR SERPLBLD CREATININE-BSD FMLA CKD-EPI: 145 ML/MIN/1.73 M 2
GLOBULIN SER CALC-MCNC: 3.8 G/DL (ref 1.9–3.5)
GLUCOSE SERPL-MCNC: 89 MG/DL (ref 65–99)
GP B STREP DNA SPEC QL NAA+PROBE: NEGATIVE
HCT VFR BLD AUTO: 26.5 % (ref 37–47)
HCT VFR BLD AUTO: 27.9 % (ref 37–47)
HGB BLD-MCNC: 7.8 G/DL (ref 12–16)
HGB BLD-MCNC: 8.4 G/DL (ref 12–16)
MAGNESIUM SERPL-MCNC: 4.7 MG/DL (ref 1.5–2.5)
MAGNESIUM SERPL-MCNC: 5.3 MG/DL (ref 1.5–2.5)
MAGNESIUM SERPL-MCNC: 6.4 MG/DL (ref 1.5–2.5)
MAGNESIUM SERPL-MCNC: 6.9 MG/DL (ref 1.5–2.5)
MCH RBC QN AUTO: 19.8 PG (ref 27–33)
MCH RBC QN AUTO: 20.8 PG (ref 27–33)
MCHC RBC AUTO-ENTMCNC: 29.4 G/DL (ref 33.6–35)
MCHC RBC AUTO-ENTMCNC: 30.1 G/DL (ref 33.6–35)
MCV RBC AUTO: 67.4 FL (ref 81.4–97.8)
MCV RBC AUTO: 69.1 FL (ref 81.4–97.8)
N GONORRHOEA DNA SPEC QL NAA+PROBE: NEGATIVE
PLATELET # BLD AUTO: 287 K/UL (ref 164–446)
PLATELET # BLD AUTO: 298 K/UL (ref 164–446)
PMV BLD AUTO: 8.8 FL (ref 9–12.9)
PMV BLD AUTO: 9 FL (ref 9–12.9)
POTASSIUM SERPL-SCNC: 3.4 MMOL/L (ref 3.6–5.5)
PRODUCT TYPE UPROD: NORMAL
PRODUCT TYPE UPROD: NORMAL
PROT SERPL-MCNC: 7.3 G/DL (ref 6–8.2)
RBC # BLD AUTO: 3.93 M/UL (ref 4.2–5.4)
RBC # BLD AUTO: 4.04 M/UL (ref 4.2–5.4)
SODIUM SERPL-SCNC: 135 MMOL/L (ref 135–145)
SPECIMEN SOURCE: ABNORMAL
UNIT STATUS USTAT: NORMAL
UNIT STATUS USTAT: NORMAL
WBC # BLD AUTO: 14.4 K/UL (ref 4.8–10.8)
WBC # BLD AUTO: 14.6 K/UL (ref 4.8–10.8)

## 2022-11-05 PROCEDURE — 96372 THER/PROPH/DIAG INJ SC/IM: CPT

## 2022-11-05 PROCEDURE — 87081 CULTURE SCREEN ONLY: CPT

## 2022-11-05 PROCEDURE — 30233N1 TRANSFUSION OF NONAUTOLOGOUS RED BLOOD CELLS INTO PERIPHERAL VEIN, PERCUTANEOUS APPROACH: ICD-10-PCS | Performed by: OBSTETRICS & GYNECOLOGY

## 2022-11-05 PROCEDURE — 85027 COMPLETE CBC AUTOMATED: CPT

## 2022-11-05 PROCEDURE — 700111 HCHG RX REV CODE 636 W/ 250 OVERRIDE (IP): Performed by: ADVANCED PRACTICE MIDWIFE

## 2022-11-05 PROCEDURE — 700102 HCHG RX REV CODE 250 W/ 637 OVERRIDE(OP): Performed by: OBSTETRICS & GYNECOLOGY

## 2022-11-05 PROCEDURE — P9016 RBC LEUKOCYTES REDUCED: HCPCS

## 2022-11-05 PROCEDURE — 700111 HCHG RX REV CODE 636 W/ 250 OVERRIDE (IP)

## 2022-11-05 PROCEDURE — 82728 ASSAY OF FERRITIN: CPT

## 2022-11-05 PROCEDURE — 96365 THER/PROPH/DIAG IV INF INIT: CPT

## 2022-11-05 PROCEDURE — 700111 HCHG RX REV CODE 636 W/ 250 OVERRIDE (IP): Performed by: OBSTETRICS & GYNECOLOGY

## 2022-11-05 PROCEDURE — 87653 STREP B DNA AMP PROBE: CPT

## 2022-11-05 PROCEDURE — 700102 HCHG RX REV CODE 250 W/ 637 OVERRIDE(OP): Performed by: ADVANCED PRACTICE MIDWIFE

## 2022-11-05 PROCEDURE — 96375 TX/PRO/DX INJ NEW DRUG ADDON: CPT

## 2022-11-05 PROCEDURE — 700105 HCHG RX REV CODE 258

## 2022-11-05 PROCEDURE — 83735 ASSAY OF MAGNESIUM: CPT

## 2022-11-05 PROCEDURE — 80053 COMPREHEN METABOLIC PANEL: CPT

## 2022-11-05 PROCEDURE — 700105 HCHG RX REV CODE 258: Performed by: ADVANCED PRACTICE MIDWIFE

## 2022-11-05 PROCEDURE — 770002 HCHG ROOM/CARE - OB PRIVATE (112)

## 2022-11-05 PROCEDURE — 36415 COLL VENOUS BLD VENIPUNCTURE: CPT

## 2022-11-05 PROCEDURE — 99232 SBSQ HOSP IP/OBS MODERATE 35: CPT | Performed by: OBSTETRICS & GYNECOLOGY

## 2022-11-05 PROCEDURE — 87150 DNA/RNA AMPLIFIED PROBE: CPT

## 2022-11-05 PROCEDURE — 86923 COMPATIBILITY TEST ELECTRIC: CPT

## 2022-11-05 PROCEDURE — A9270 NON-COVERED ITEM OR SERVICE: HCPCS | Performed by: ADVANCED PRACTICE MIDWIFE

## 2022-11-05 PROCEDURE — 302790 HCHG STAT ANTEPARTUM CARE, DAILY

## 2022-11-05 PROCEDURE — 36430 TRANSFUSION BLD/BLD COMPNT: CPT

## 2022-11-05 PROCEDURE — A9270 NON-COVERED ITEM OR SERVICE: HCPCS | Performed by: OBSTETRICS & GYNECOLOGY

## 2022-11-05 RX ORDER — ONDANSETRON 2 MG/ML
4 INJECTION INTRAMUSCULAR; INTRAVENOUS EVERY 4 HOURS PRN
Status: DISCONTINUED | OUTPATIENT
Start: 2022-11-05 | End: 2022-11-08

## 2022-11-05 RX ORDER — ONDANSETRON 2 MG/ML
INJECTION INTRAMUSCULAR; INTRAVENOUS
Status: COMPLETED
Start: 2022-11-05 | End: 2022-11-05

## 2022-11-05 RX ORDER — AZITHROMYCIN 250 MG/1
1000 TABLET, FILM COATED ORAL ONCE
Status: COMPLETED | OUTPATIENT
Start: 2022-11-05 | End: 2022-11-05

## 2022-11-05 RX ORDER — CEPHALEXIN 500 MG/1
500 CAPSULE ORAL EVERY 6 HOURS
Status: DISCONTINUED | OUTPATIENT
Start: 2022-11-05 | End: 2022-11-06

## 2022-11-05 RX ADMIN — ONDANSETRON 4 MG: 2 INJECTION INTRAMUSCULAR; INTRAVENOUS at 18:25

## 2022-11-05 RX ADMIN — MAGNESIUM SULFATE HEPTAHYDRATE 2 G/HR: 40 INJECTION, SOLUTION INTRAVENOUS at 00:35

## 2022-11-05 RX ADMIN — ONDANSETRON 4 MG: 2 INJECTION INTRAMUSCULAR; INTRAVENOUS at 11:32

## 2022-11-05 RX ADMIN — MAGNESIUM SULFATE HEPTAHYDRATE 4 G: 40 INJECTION, SOLUTION INTRAVENOUS at 00:11

## 2022-11-05 RX ADMIN — SODIUM CHLORIDE 100 ML: 900 INJECTION INTRAVENOUS at 00:50

## 2022-11-05 RX ADMIN — INDOMETHACIN 50 MG: 50 CAPSULE ORAL at 00:13

## 2022-11-05 RX ADMIN — INDOMETHACIN 50 MG: 50 CAPSULE ORAL at 17:43

## 2022-11-05 RX ADMIN — CEPHALEXIN 500 MG: 500 CAPSULE ORAL at 18:26

## 2022-11-05 RX ADMIN — MAGNESIUM SULFATE HEPTAHYDRATE 2 G/HR: 40 INJECTION, SOLUTION INTRAVENOUS at 16:52

## 2022-11-05 RX ADMIN — BETAMETHASONE SODIUM PHOSPHATE AND BETAMETHASONE ACETATE 12 MG: 3; 3 INJECTION, SUSPENSION INTRA-ARTICULAR; INTRALESIONAL; INTRAMUSCULAR at 00:49

## 2022-11-05 RX ADMIN — SODIUM CHLORIDE, POTASSIUM CHLORIDE, SODIUM LACTATE AND CALCIUM CHLORIDE: 600; 310; 30; 20 INJECTION, SOLUTION INTRAVENOUS at 00:05

## 2022-11-05 RX ADMIN — SODIUM CHLORIDE, POTASSIUM CHLORIDE, SODIUM LACTATE AND CALCIUM CHLORIDE: 600; 310; 30; 20 INJECTION, SOLUTION INTRAVENOUS at 14:02

## 2022-11-05 RX ADMIN — INDOMETHACIN 50 MG: 50 CAPSULE ORAL at 06:18

## 2022-11-05 RX ADMIN — PENICILLIN G POTASSIUM 5 MILLION UNITS: 5000000 POWDER, FOR SOLUTION INTRAMUSCULAR; INTRAPLEURAL; INTRATHECAL; INTRAVENOUS at 00:50

## 2022-11-05 RX ADMIN — INDOMETHACIN 50 MG: 50 CAPSULE ORAL at 12:55

## 2022-11-05 RX ADMIN — AZITHROMYCIN MONOHYDRATE 1000 MG: 250 TABLET ORAL at 19:28

## 2022-11-05 ASSESSMENT — PATIENT HEALTH QUESTIONNAIRE - PHQ9
SUM OF ALL RESPONSES TO PHQ9 QUESTIONS 1 AND 2: 0
1. LITTLE INTEREST OR PLEASURE IN DOING THINGS: NOT AT ALL
2. FEELING DOWN, DEPRESSED, IRRITABLE, OR HOPELESS: NOT AT ALL

## 2022-11-05 ASSESSMENT — ENCOUNTER SYMPTOMS: BACK PAIN: 1

## 2022-11-05 ASSESSMENT — PAIN DESCRIPTION - PAIN TYPE
TYPE: ACUTE PAIN
TYPE: ACUTE PAIN

## 2022-11-05 NOTE — PROGRESS NOTES
194: pt here reporting to be around 29 weeks pregnant. She reports this being her 3rd pregnancy. With one full term  and one 19 wk FD. She has not received prenatal care this pregnancy, denies complications with previous delivery or current health conditions. Presenting to triage with back pain that comes and goes over the past week. Tylenol improves pain and movement causes pain to worsen. She denies ctx or LOF. Baby active as normal. Pt reports small amount of bright red spotting yesterday after voiding which resolved. She also has pain and frequency with urination. Татьяна STEWART given update, orders placed. POC discussed with pt  2300: verbal orders for FFN and LR IV hydration  2315: pt had vasovagal episode following IV attempt, improved with stimulation. Татьяна and Jacqueline aware of BP that has improved  2330: Kareem at bedside. Report given to DMITRY Dumont

## 2022-11-05 NOTE — CARE PLAN
Problem: Risk for Fluid Imbalance  Goal: Patient's fluid volume balance will be maintained or improve  Outcome: Met   Monitor of I/O for safety with medications and diagnosis.  No problems at this time.   Problem: Risk for Injury  Goal: Patient and fetus will be free of preventable injury/complications  Outcome: Met   Pt instructed on bedrest measures and states understanding at this time, no further concerns.

## 2022-11-05 NOTE — H&P
Admission History and Physical      Shae Peguero is a 27 y.o. female  at 29w2d who presents for low back pain    Subjective:   Patient presented to triage without prenatal care. She had prenatal lab work revealing severe anemia.  labor was also noted and decision for admission made.     ROS: Musculoskeletal:positive for back pain    Past Medical History:   Diagnosis Date    Urinary tract infection      No past surgical history on file.  OB History    Para Term  AB Living   3 2 1     1   SAB IAB Ectopic Molar Multiple Live Births           0 1      # Outcome Date GA Lbr Sudeep/2nd Weight Sex Delivery Anes PTL Lv   3 Current            2 Para 21 19w5d  0 kg () M Vag-Spont None Y FD      Birth Comments: 0.15 g    1 Term 20 39w2d / 00:10 3.265 kg (7 lb 3.2 oz) F Vag-Vacuum None N HUGO     Social History     Socioeconomic History    Marital status: Single     Spouse name: Not on file    Number of children: Not on file    Years of education: Not on file    Highest education level: Not on file   Occupational History    Not on file   Tobacco Use    Smoking status: Never    Smokeless tobacco: Never   Vaping Use    Vaping Use: Never used   Substance and Sexual Activity    Alcohol use: Never    Drug use: Never    Sexual activity: Yes     Partners: Male     Comment: none   Other Topics Concern    Not on file   Social History Narrative    Not on file     Social Determinants of Health     Financial Resource Strain: Not on file   Food Insecurity: Not on file   Transportation Needs: Not on file   Physical Activity: Not on file   Stress: Not on file   Social Connections: Not on file   Intimate Partner Violence: Not on file   Housing Stability: Not on file     Allergies: Patient has no known allergies.    Current Facility-Administered Medications:     betamethasone acetate-betamethasone sodium phosphate (CELESTONE) injection 12 mg, 12 mg, Intramuscular, Q24HR, NERIS Arora, 12 mg  at 22 0049    lactated ringers infusion, , Intravenous, Continuous, [address], OGNP, Last Rate: 50 mL/hr at 22 0005, New Bag at 22 0005    [COMPLETED] magnesium sulfate IVPB premix 4 g, 4 g, Intravenous, Once, Stopped at 22 0031 **FOLLOWED BY** magnesium sulfate 40 g/1000mL infusion, 2 g/hr, Intravenous, Continuous, [address], OGNP, Last Rate: 50 mL/hr at 22 0035, 2 g/hr at 22 0035    indomethacin (INDOCIN) capsule 50 mg, 50 mg, Oral, Q6HRS, [address], OGNP, 50 mg at 22 0618    No prenatal care.     Patient Active Problem List    Diagnosis Date Noted    Indication for care in labor or delivery 2022    Postpartum care following vaginal delivery 2021    , complete 2021       Last US today  2022 8:44 PM     HISTORY/REASON FOR EXAM:  Routine screening for abnormality; no prenatal care- 29 weeks???  Vaginal bleeding     TECHNIQUE/EXAM DESCRIPTION: OB complete ultrasound.     COMPARISON:  None     FINDINGS:  Fetal Lie:  Vertex  LMP:  2022  Clinical SAMUEL by LMP:  2023     Placenta (Location):  Posterior  Placenta Previa: No     Amniotic Fluid Volume:  MARINA = 14.61 cm     Fetal Heart Rate:  130 bpm     Cervical Length:  3 cm  . Fluid is noted in the cervix.     No maternal adnexal mass is identified.     Umbilical Artery S/D Ratio(s):  Not performed     Fetal Anatomy  (Seen or Not Seen)  Lateral Ventricles     NOT seen  Cisterna Magna        NOT seen  Cerebellum              Seen  CSP             Seen  Orbits             NOT seen  Face/Lips                NOT seen  Cord Insertion         Seen  Placental CI         Seen  4 Chamber Heart     Seen  LVOT               Seen  RVOT              Seen  3 Vessel View     Seen  Stomach       Seen  Kidneys                   Seen  Urinary Bladder      Seen  Spine                       Seen  3 Vessel Cord          Seen  Both Upper Extremities    Seen  Both Lower Extremities     Seen  Diaphragm             Seen  Movement       Seen  Gender:  Likely female     Fetal Biometry  BPD    7.45 cm, 30 weeks, 0 days, (62 Percent)  HC    26.88 cm, 29 weeks, 3 days, (21 Percent)  AC    24.24 cm, 28 weeks, 4 days, (25 Percent)  Femur Length    5.61 cm, 29 weeks, 4 days, (46 Percent)  Humerus Length    4.70 cm, 27 weeks, 5 days, (14 Percent)  Cerebellum Diameter   not seen, , ( )     EGA by this US:  29 weeks, 3 days  SAMUEL by this US: 01/17/2023  SAMUEL by 1st US:  None available     Estimated Fetal Weight:  1321 grams  EFW Percentile: 32 Percent     Comments:     IMPRESSION:     1.  Single intrauterine pregnancy of an estimated gestational age of 29 weeks, 3 days with an estimated date of delivery of 01/17/2023.  2.  Fluid is noted in the cervix.  3.  The lateral ventricles, cisterna magna, orbits, and face/lips are not seen on this exam due to fetal positioning.  4.  Fetal survey is otherwise within normal limits.      Objective:      BP (!) 93/55   Pulse 83   Temp 36.8 °C (98.2 °F) (Temporal)   Resp 18   SpO2 96%     General:   alert, appears weak, pale   Skin:   normal   HEENT:  PERRLA   Lungs:   CTA bilateral   Heart:   S1, S2 normal, no murmur, click, rub or gallop, regular rate and rhythm, peripheral pulses very brisk, chest is clear without rales or wheezing, no pedal edema, no hepatosplenomegaly   Abdomen:   gravid, NT   EFW:  1500g   Pelvis:  adequate, diagnonal conjugate not assessed   FHT:  140 BPM   Uterine Size: S=D   Presentations: Cephalic   Cervix: Per RN    Dilation: 2cm    Effacement: 75%    Station:  -2    Consistency: Soft    Position: Anterior     Lab Review  Lab:   Blood type: O     Recent Results (from the past 5880 hour(s))   URINALYSIS    Collection Time: 11/04/22  8:20 PM    Specimen: Urine, Clean Catch   Result Value Ref Range    Color Yellow     Character Turbid (A)     Specific Gravity 1.021 <1.035    Ph 6.0 5.0 - 8.0    Glucose Negative Negative mg/dL    Ketones Negative  Negative mg/dL    Protein 30 (A) Negative mg/dL    Bilirubin Negative Negative    Urobilinogen, Urine 1.0 Negative    Nitrite Negative Negative    Leukocyte Esterase Moderate (A) Negative    Occult Blood Small (A) Negative    Micro Urine Req Microscopic    Chlamydia/GC, PCR (Urine)    Collection Time: 11/04/22  8:20 PM    Specimen: Urine   Result Value Ref Range    Source Urine    URINE DRUG SCREEN    Collection Time: 11/04/22  8:20 PM   Result Value Ref Range    Amphetamines Urine Negative Negative    Barbiturates Negative Negative    Benzodiazepines Negative Negative    Cocaine Metabolite Negative Negative    Methadone Negative Negative    Opiates Negative Negative    Oxycodone Negative Negative    Phencyclidine -Pcp Negative Negative    Propoxyphene Negative Negative    Cannabinoid Metab Negative Negative   URINE MICROSCOPIC (W/UA)    Collection Time: 11/04/22  8:20 PM   Result Value Ref Range    WBC 20-50 (A) /hpf    RBC 2-5 (A) /hpf    Bacteria Many (A) None /hpf    Epithelial Cells Few /hpf   PRENATAL PANEL 3+HIV+HCV    Collection Time: 11/04/22  8:37 PM   Result Value Ref Range    WBC 13.1 (H) 4.8 - 10.8 K/uL    RBC 3.66 (L) 4.20 - 5.40 M/uL    Hemoglobin 6.5 (L) 12.0 - 16.0 g/dL    Hematocrit 23.3 (L) 37.0 - 47.0 %    MCV 63.7 (L) 81.4 - 97.8 fL    MCH 17.8 (L) 27.0 - 33.0 pg    MCHC 27.9 (L) 33.6 - 35.0 g/dL    RDW 44.5 35.9 - 50.0 fL    Platelet Count 300 164 - 446 K/uL    MPV 8.8 (L) 9.0 - 12.9 fL    Neutrophils-Polys 71.80 44.00 - 72.00 %    Lymphocytes 18.40 (L) 22.00 - 41.00 %    Monocytes 5.60 0.00 - 13.40 %    Eosinophils 1.10 0.00 - 6.90 %    Basophils 0.70 0.00 - 1.80 %    Immature Granulocytes 2.40 (H) 0.00 - 0.90 %    Nucleated RBC 0.20 /100 WBC    Neutrophils (Absolute) 9.38 (H) 2.00 - 7.15 K/uL    Lymphs (Absolute) 2.41 1.00 - 4.80 K/uL    Monos (Absolute) 0.73 0.00 - 0.85 K/uL    Eos (Absolute) 0.14 0.00 - 0.51 K/uL    Baso (Absolute) 0.09 0.00 - 0.12 K/uL    Immature Granulocytes (abs) 0.32  (H) 0.00 - 0.11 K/uL    NRBC (Absolute) 0.02 K/uL    Hypochromia 1+     Anisocytosis 2+ (A)     Microcytosis 2+ (A)     Rubella IgG Antibody 21.60 IU/mL    Hepatitis B Surface Antigen Non-Reactive Non-Reactive    Hepatitis C Antibody Non-Reactive Non-Reactive    Syphilis, Treponemal Qual Non-Reactive Non-Reactive   HIV AG/AB COMBO ASSAY SCREENING    Collection Time: 11/04/22  8:37 PM   Result Value Ref Range    HIV Ag/Ab Combo Assay Non-Reactive Non Reactive   OP Prenatal Panel-Blood Bank    Collection Time: 11/04/22  8:37 PM   Result Value Ref Range    ABO Grouping Only O     Rh Grouping Only POS     Antibody Screen Scrn NEG    PERIPHERAL SMEAR REVIEW    Collection Time: 11/04/22  8:37 PM   Result Value Ref Range    Peripheral Smear Review see below    PLATELET ESTIMATE    Collection Time: 11/04/22  8:37 PM   Result Value Ref Range    Plt Estimation Normal    MORPHOLOGY    Collection Time: 11/04/22  8:37 PM   Result Value Ref Range    RBC Morphology Present     Polychromia 1+     Ovalocytes 1+     Tear Drop Cells 1+    COMPONENT CELLULAR    Collection Time: 11/04/22  8:37 PM   Result Value Ref Range    Component R       R99                 Red Cells, LR       [id number]   transfused   11/04/22   23:50      Product Type R99     Dispense Status transfused     Unit Number (Barcoded) [id number]     Product Code (Barcoded) J6488H12     Blood Type (Barcoded) 9500     Component R       R99                 Red Cells, LR       [id number]   issued       11/05/22   02:57      Product Type R99     Dispense Status issued     Unit Number (Barcoded) [id number]     Product Code (Barcoded) V9469S94     Blood Type (Barcoded) 9500    EKG    Collection Time: 11/04/22 11:53 PM   Result Value Ref Range    Report       [facility]    Test Date:  2022-11-04  Pt Name:    JYOTI KRISHNA               Department: 21  MRN:        [id number]                      Room:       S236  Gender:     Female                        Technician: SHANELLE  :        1995                   Requested By:JUAN CARLOS MAYES  Order #:    [id number]                    Reading MD:    Measurements  Intervals                                Axis  Rate:       94                           P:          19  NM:         152                          QRS:        82  QRSD:       81                           T:          19  QT:         382  QTc:        478    Interpretive Statements  Sinus rhythm  Borderline prolonged QT interval  Compared to ECG 2021 09:06:43  T-wave abnormality no longer present     CBC WITHOUT DIFFERENTIAL    Collection Time: 22  2:17 AM   Result Value Ref Range    WBC 14.4 (H) 4.8 - 10.8 K/uL    RBC 3.93 (L) 4.20 - 5.40 M/uL    Hemoglobin 7.8 (L) 12.0 - 16.0 g/dL    Hematocrit 26.5 (L) 37.0 - 47.0 %    MCV 67.4 (L) 81.4 - 97.8 fL    MCH 19.8 (L) 27.0 - 33.0 pg    MCHC 29.4 (L) 33.6 - 35.0 g/dL    RDW 56.4 (H) 35.9 - 50.0 fL    Platelet Count 298 164 - 446 K/uL    MPV 9.0 9.0 - 12.9 fL   COMP METABOLIC PANEL    Collection Time: 22  2:17 AM   Result Value Ref Range    Sodium 135 135 - 145 mmol/L    Potassium 3.4 (L) 3.6 - 5.5 mmol/L    Chloride 103 96 - 112 mmol/L    Co2 18 (L) 20 - 33 mmol/L    Anion Gap 14.0 7.0 - 16.0    Glucose 89 65 - 99 mg/dL    Bun 7 (L) 8 - 22 mg/dL    Creatinine 0.35 (L) 0.50 - 1.40 mg/dL    Calcium 7.8 (L) 8.5 - 10.5 mg/dL    AST(SGOT) 16 12 - 45 U/L    ALT(SGPT) <5 2 - 50 U/L    Alkaline Phosphatase 82 30 - 99 U/L    Total Bilirubin 1.0 0.1 - 1.5 mg/dL    Albumin 3.5 3.2 - 4.9 g/dL    Total Protein 7.3 6.0 - 8.2 g/dL    Globulin 3.8 (H) 1.9 - 3.5 g/dL    A-G Ratio 0.9 g/dL   FERRITIN    Collection Time: 22  2:17 AM   Result Value Ref Range    Ferritin 4.7 (L) 10.0 - 291.0 ng/mL   MAGNESIUM    Collection Time: 22  2:17 AM   Result Value Ref Range    Magnesium 5.3 (H) 1.5 - 2.5 mg/dL   ESTIMATED GFR    Collection Time: 22  2:17 AM   Result Value Ref Range    GFR (CKD-EPI) 145 >60  mL/min/1.73 m 2          Assessment:   Shae Peguero at 29w2d  Labor status: Dysfunctional uterine contractions. and  labor  Obstetrical history significant for   Patient Active Problem List    Diagnosis Date Noted    Indication for care in labor or delivery 2022    Postpartum care following vaginal delivery 2021    , complete 2021   .      Plan:     1. Admit to L&D antepartum service- Dr Driscoll providing orders at bedside.   2. GBS unknown- swab and start antibiotics. Add vag pathogens   3. Consent for blood transfusion at this time. Abdomen remain soft and non tender to palpation. Indomethacin. Betamethasone for  status. Magnesium sulfate due to  labor.   4. Iron studies, EKG. Discussed status with patient and she voices understanding of all interventions.       [facility]/ Dr. Driscoll Attending

## 2022-11-05 NOTE — PROGRESS NOTES
0700  Report from NOC RN in room and pt resting at this time, RN to return later for assessment.  0900  In with pt and POC discussed, pt very tired and has no report of UC's or bleeding or leaking at this time.  Orders for diet order implemented.  1100  Pt vomitted and has report of nausea and double vision.  POC discussed and will medicate for the nausea shortly.  1130  Report given to Dr. Andrews and orders for zofran received and given.  1230  Pt instructed to start to eat slowly with 1 bite at time.  Pt reports the zofran didn't help for her nausea.  1300  Report to David Mcpherson in room with pt at this time.

## 2022-11-05 NOTE — PROGRESS NOTES
2325 Sterile speculum exam by this RN, moderate amount of thick white discharge noted. Sample collected for FFN. SVE 3/80/jeanne. Татьяна RAMIREZ CNM updated, FFN test discarded.    2330 Report received from Debby DE SANTIAGO RN at bedside and assumed care. POC discussed with pt and s/o and encouraged to state needs or questions at any time. Татьяна RAMIREZ CNM at bedside at this time.    2346 Pt transferred to antepartum room via gurney with side rails up.    0100 Report given to Teresa LANGSTON RN at bedside

## 2022-11-05 NOTE — ED TRIAGE NOTES
Pt reports pregnancy, reports vaginal bleeding yesterday and today severe low back pain. LMP April. Pt denies prenatal care due to no insurance. Denies rupture of membranes.  L&D charge RN cristofer notified. Pt to L&D with security.

## 2022-11-05 NOTE — ED PROVIDER NOTES
Emergency Obstetric Consultation     Date of Service  2022    Reason for Consultation  No chief complaint on file.      History of Presenting Illness  27 y.o. female who presented 2022 with Reason for consult: Patient presents to OB ED for complaints of lower back pain. She reports no prenatal care this pregnancy related to cost. She has had no early ultrasound. She was having regular menses and reports sure LMP putting her approx. 29 weeks gestation. She reports low back pain has been present for past week. Denies presence of vaginal bleeding or leaking of fluid. Also reporting burning with urination at this time.       Of note, prenatal history notable for short cervix in first pregnancy two years ago but did result in full term pregnancy. Had second pregnancy within 6 months of the first that resulted in fetal demise after advanced cervical dilation and PPROM at 19 weeks. She did not have prenatal care in that pregnancy either.   Fetal activity: Perceived fetal activity is normal.    .    Review of Systems  Review of Systems   Genitourinary:  Positive for dysuria and frequency.   Musculoskeletal:  Positive for back pain.     Obstetric History    OB History    Para Term  AB Living   3 2 1     1   SAB IAB Ectopic Molar Multiple Live Births           0 1      # Outcome Date GA Lbr Sudeep/2nd Weight Sex Delivery Anes PTL Lv   3 Current            2 Para 21 19w5d  0 kg () M Vag-Spont None Y FD      Birth Comments: 0.15 g    1 Term 20 39w2d / 00:10 3.265 kg (7 lb 3.2 oz) F Vag-Vacuum None N HUGO       Gynecologic History  No LMP recorded. Patient is pregnant.    Medical History  Past Medical History:   Diagnosis Date    Urinary tract infection        Surgical History   has no past surgical history on file.    Family History  family history includes No Known Problems in her brother, father, mother, and sister.    Social History   reports that she has never smoked. She has never  used smokeless tobacco. She reports that she does not drink alcohol and does not use drugs.    Medications  Medications Prior to Admission   Medication Sig Dispense Refill Last Dose    ibuprofen (MOTRIN) 800 MG Tab Take 1 tablet by mouth every 8 hours as needed (For cramping after delivery; do not give if patient is receiving ketorolac (Toradol)). 30 tablet 0     acetaminophen (TYLENOL) 500 MG Tab Take 500-1,000 mg by mouth every 6 hours as needed.          Allergies  No Known Allergies    Physical Exam  Maternal Exam:  Uterine Assessment: Contraction frequency is irregular.   Abdomen: Patient reports no abdominal tenderness. Fetal presentation: vertex  Introitus: Normal vulva. Normal vagina.  Baseline rate: 145.   Variability: moderate (6-25 bpm).    Fetal State Assessment: Category I - tracings are normal.  HENT:      Head: Normocephalic.   Cardiovascular:      Rate and Rhythm: Normal rate.   Abdominal:      General: Bowel sounds are normal.      Comments: gravid   Constitutional:       Appearance: Normal appearance.   Pulmonary:      Effort: Pulmonary effort is normal.      Breath sounds: Normal breath sounds.   Neurological:      Mental Status: She is alert.   Genitourinary:     General: Normal vulva.           Assessment & Plan  Assessment:  Dysfunctional uterine contractions.   Fetal well-being: normal.   1. 28 yo  with IUP at 29.1 weeks  2. Cat I FHR tracing  3. Low back pain  4.  contractions  5. No prenatal care    Plan:  1. Obtain ultrasound. Once placenta cleared, can check ffn and cervix for dilation  2. Encouraged oral hydration  3. Prenatal labs now with UA and rule out infection.         NERIS Arora

## 2022-11-05 NOTE — PROGRESS NOTES
0110 Report from Julia ANDRES at bedside. Pts blood transfusion is completed.   Pt called RN to bedside. Pt states she feels dizzy and hard to breathe. Pt vital signs stable. Lung sounds clear. CNM notified. Stat lab ordered.   Pt states she feels better.  She states she is feeling her contractions more.  SVE unchanged.  Report to Rashida ANDRES

## 2022-11-05 NOTE — PROGRESS NOTES
1300- report received from Rashida ARAMBULA RN.    1700- Dr Davies updated on patient's status, last magnesium draw and uterine activity. Order received to repeat CBC with next magnesium blood draw.     1740- Dr Davies updated on + chlamydia result. Will be by to see patient.     1800- Dr Davies into see patient. Discussed plan of care with patient. Order received to discontinue magnesium, Magnesium discontinued    1900- report given to Kae ANDRES

## 2022-11-06 LAB
BACTERIA UR CULT: ABNORMAL
BACTERIA UR CULT: ABNORMAL
EKG IMPRESSION: NORMAL
GP B STREP DNA SPEC QL NAA+PROBE: NEGATIVE
SIGNIFICANT IND 70042: ABNORMAL
SITE SITE: ABNORMAL
SOURCE SOURCE: ABNORMAL

## 2022-11-06 PROCEDURE — 700101 HCHG RX REV CODE 250: Performed by: OBSTETRICS & GYNECOLOGY

## 2022-11-06 PROCEDURE — 700111 HCHG RX REV CODE 636 W/ 250 OVERRIDE (IP): Performed by: ADVANCED PRACTICE MIDWIFE

## 2022-11-06 PROCEDURE — 700102 HCHG RX REV CODE 250 W/ 637 OVERRIDE(OP): Performed by: OBSTETRICS & GYNECOLOGY

## 2022-11-06 PROCEDURE — 59025 FETAL NON-STRESS TEST: CPT

## 2022-11-06 PROCEDURE — 700105 HCHG RX REV CODE 258: Performed by: OBSTETRICS & GYNECOLOGY

## 2022-11-06 PROCEDURE — A9270 NON-COVERED ITEM OR SERVICE: HCPCS | Performed by: OBSTETRICS & GYNECOLOGY

## 2022-11-06 PROCEDURE — 302790 HCHG STAT ANTEPARTUM CARE, DAILY

## 2022-11-06 PROCEDURE — 700105 HCHG RX REV CODE 258: Performed by: ADVANCED PRACTICE MIDWIFE

## 2022-11-06 PROCEDURE — 99232 SBSQ HOSP IP/OBS MODERATE 35: CPT | Mod: 25 | Performed by: OBSTETRICS & GYNECOLOGY

## 2022-11-06 PROCEDURE — 770002 HCHG ROOM/CARE - OB PRIVATE (112)

## 2022-11-06 PROCEDURE — 96367 TX/PROPH/DG ADDL SEQ IV INF: CPT

## 2022-11-06 PROCEDURE — 96372 THER/PROPH/DIAG INJ SC/IM: CPT

## 2022-11-06 PROCEDURE — 59025 FETAL NON-STRESS TEST: CPT | Mod: 26 | Performed by: OBSTETRICS & GYNECOLOGY

## 2022-11-06 PROCEDURE — 700111 HCHG RX REV CODE 636 W/ 250 OVERRIDE (IP): Performed by: OBSTETRICS & GYNECOLOGY

## 2022-11-06 PROCEDURE — 93010 ELECTROCARDIOGRAM REPORT: CPT | Performed by: INTERNAL MEDICINE

## 2022-11-06 RX ADMIN — INDOMETHACIN 50 MG: 50 CAPSULE ORAL at 13:00

## 2022-11-06 RX ADMIN — CEPHALEXIN 500 MG: 500 CAPSULE ORAL at 00:13

## 2022-11-06 RX ADMIN — SODIUM CHLORIDE 2.5 MILLION UNITS: 9 INJECTION, SOLUTION INTRAVENOUS at 15:07

## 2022-11-06 RX ADMIN — BETAMETHASONE SODIUM PHOSPHATE AND BETAMETHASONE ACETATE 12 MG: 3; 3 INJECTION, SUSPENSION INTRA-ARTICULAR; INTRALESIONAL; INTRAMUSCULAR at 00:13

## 2022-11-06 RX ADMIN — INDOMETHACIN 50 MG: 50 CAPSULE ORAL at 00:13

## 2022-11-06 RX ADMIN — INDOMETHACIN 50 MG: 50 CAPSULE ORAL at 18:16

## 2022-11-06 RX ADMIN — SODIUM CHLORIDE 2.5 MILLION UNITS: 9 INJECTION, SOLUTION INTRAVENOUS at 10:21

## 2022-11-06 RX ADMIN — CEPHALEXIN 500 MG: 500 CAPSULE ORAL at 05:51

## 2022-11-06 RX ADMIN — SODIUM CHLORIDE, POTASSIUM CHLORIDE, SODIUM LACTATE AND CALCIUM CHLORIDE 1000 ML: 600; 310; 30; 20 INJECTION, SOLUTION INTRAVENOUS at 19:53

## 2022-11-06 RX ADMIN — INDOMETHACIN 50 MG: 50 CAPSULE ORAL at 05:51

## 2022-11-06 RX ADMIN — SODIUM CHLORIDE, POTASSIUM CHLORIDE, SODIUM LACTATE AND CALCIUM CHLORIDE: 600; 310; 30; 20 INJECTION, SOLUTION INTRAVENOUS at 10:21

## 2022-11-06 RX ADMIN — SODIUM CHLORIDE, POTASSIUM CHLORIDE, SODIUM LACTATE AND CALCIUM CHLORIDE: 600; 310; 30; 20 INJECTION, SOLUTION INTRAVENOUS at 02:10

## 2022-11-06 RX ADMIN — SODIUM CHLORIDE 2.5 MILLION UNITS: 9 INJECTION, SOLUTION INTRAVENOUS at 18:16

## 2022-11-06 RX ADMIN — SODIUM CHLORIDE 2.5 MILLION UNITS: 9 INJECTION, SOLUTION INTRAVENOUS at 22:25

## 2022-11-06 NOTE — PROGRESS NOTES
0700  Report received, plan of care discussed. Pt asleep.   0800 Assessment done. Pt states she feels much better without magnesium, but is still very sleepy. Denies contractions, LOF.  0830 Dr. Boyd in pts room. Monitor showing some uterine irritability, but pt denies feeling it. Will continue with hilton and continuous monitoring for now per Dr. Boyd.    1400 Hilton cath discontinued.  1600 Up to bathroom with assist. Tolererated well.   1800 pt up and down to bathroom without assist.   1900 Report to Shelley ANDRES.

## 2022-11-06 NOTE — PROGRESS NOTES
ANTEPARTUM PROGRESS NOTE;    Shae Peguero is a 27 y.o. female  at 29w3d.  Admitted on 2022 with history of no prenatal care,  labor and severe anemia necessitating transfusion.  Patient was on Indocin and magnesium sulfate but magnesium sulfate was discontinued due to side effects    Patient Active Problem List    Diagnosis Date Noted    UTI (urinary tract infection) during pregnancy, third trimester 2022     labor in third trimester without delivery 2022    Postpartum care following vaginal delivery 2021    , complete 2021    Chlamydia infection affecting pregnancy 2020       Review of systems; denies vaginal bleeding, leakage of fluid, uterine contractions, fever chills or abdominal pain  Past Medical History:   Diagnosis Date    Urinary tract infection      No past surgical history on file.  Patient has no known allergies.  Social History     Socioeconomic History    Marital status: Single     Spouse name: Not on file    Number of children: Not on file    Years of education: Not on file    Highest education level: Not on file   Occupational History    Not on file   Tobacco Use    Smoking status: Never    Smokeless tobacco: Never   Vaping Use    Vaping Use: Never used   Substance and Sexual Activity    Alcohol use: Never    Drug use: Never    Sexual activity: Yes     Partners: Male     Comment: none   Other Topics Concern    Not on file   Social History Narrative    Not on file     Social Determinants of Health     Financial Resource Strain: Not on file   Food Insecurity: Not on file   Transportation Needs: Not on file   Physical Activity: Not on file   Stress: Not on file   Social Connections: Not on file   Intimate Partner Violence: Not on file   Housing Stability: Not on file         Physical examination;  Alert and oriented x3  Gen.-well-developed well-nourished female in no apparent distress  HEENT-normocephalic,  nontraumatic,EOMI,PERRLA  BP (!) 88/53 Comment: Rn notified  Pulse 88   Temp 36.2 °C (97.2 °F) (Temporal)   Resp 15   SpO2 93%   Breastfeeding No   Skin is warm and dry  Back-negative for CVA tenderness  Cardiovascular-regular rate and rhythm, normal S1-S2 no murmurs gallops  Lungs-clear to auscultation bilaterally  Abdomen-nondistended positive bowel sounds soft nontender without masses or hepatosplenomegaly  Cervix-2 cm / 75% effaced per C.  García CNM  Extremities without cyanosis clubbing or edema  Neurologic grossly intact    Labs;      NST-as performed and read by myself; reactive NST without contractions    Impression;  IUP AT 29w3d   labor-stable off magnesium sulfate second dose of betamethasone at 0100 hrs. this morning.  Anemia-blood transfusion x2 units packed red blood cells  Urinary tract infection-culture positive for Streptococcus gallolyticus sensitive to penicillin  No prenatal care    Plan;  Continue indomethacin until patient 48 hours out from betamethasone  Add antibiotic coverage for UTI  Check CBC  GBS culture pending    Cirilo Boyd MD

## 2022-11-07 PROCEDURE — 700102 HCHG RX REV CODE 250 W/ 637 OVERRIDE(OP): Performed by: ADVANCED PRACTICE MIDWIFE

## 2022-11-07 PROCEDURE — 59025 FETAL NON-STRESS TEST: CPT | Mod: 26 | Performed by: OBSTETRICS & GYNECOLOGY

## 2022-11-07 PROCEDURE — 96367 TX/PROPH/DG ADDL SEQ IV INF: CPT

## 2022-11-07 PROCEDURE — A9270 NON-COVERED ITEM OR SERVICE: HCPCS | Performed by: OBSTETRICS & GYNECOLOGY

## 2022-11-07 PROCEDURE — 700105 HCHG RX REV CODE 258: Performed by: ADVANCED PRACTICE MIDWIFE

## 2022-11-07 PROCEDURE — 700102 HCHG RX REV CODE 250 W/ 637 OVERRIDE(OP): Performed by: OBSTETRICS & GYNECOLOGY

## 2022-11-07 PROCEDURE — 99232 SBSQ HOSP IP/OBS MODERATE 35: CPT | Mod: 25 | Performed by: OBSTETRICS & GYNECOLOGY

## 2022-11-07 PROCEDURE — 770002 HCHG ROOM/CARE - OB PRIVATE (112)

## 2022-11-07 PROCEDURE — A9270 NON-COVERED ITEM OR SERVICE: HCPCS | Performed by: ADVANCED PRACTICE MIDWIFE

## 2022-11-07 PROCEDURE — 700111 HCHG RX REV CODE 636 W/ 250 OVERRIDE (IP): Performed by: OBSTETRICS & GYNECOLOGY

## 2022-11-07 PROCEDURE — 302790 HCHG STAT ANTEPARTUM CARE, DAILY

## 2022-11-07 RX ORDER — INDOMETHACIN 50 MG/1
50 CAPSULE ORAL ONCE
Status: COMPLETED | OUTPATIENT
Start: 2022-11-07 | End: 2022-11-07

## 2022-11-07 RX ADMIN — INDOMETHACIN 50 MG: 50 CAPSULE ORAL at 01:15

## 2022-11-07 RX ADMIN — SODIUM CHLORIDE 2.5 MILLION UNITS: 9 INJECTION, SOLUTION INTRAVENOUS at 06:03

## 2022-11-07 RX ADMIN — SODIUM CHLORIDE 2.5 MILLION UNITS: 9 INJECTION, SOLUTION INTRAVENOUS at 02:08

## 2022-11-07 RX ADMIN — SODIUM CHLORIDE, POTASSIUM CHLORIDE, SODIUM LACTATE AND CALCIUM CHLORIDE: 600; 310; 30; 20 INJECTION, SOLUTION INTRAVENOUS at 05:10

## 2022-11-07 RX ADMIN — INDOMETHACIN 50 MG: 50 CAPSULE ORAL at 01:02

## 2022-11-07 NOTE — PROGRESS NOTES
0700: Report received from DMITRY Rosa. Pt resting in bed.  0900: Pt status discussed w/ Dr. Nathan; POC discussed; orders received (see MAR).  1045: Dr. Nathan updated on pt status including variable and prolonged fetal heart declarations; MD reviewed tracing, orders received to do continuous monitoring.  1230: Dr. Nathan updated on fetal heart tracing including continued variables and occasional late decelerations; will continue to monitor.  1900: Report given to DMITRY Guardado.

## 2022-11-07 NOTE — CARE PLAN
The patient is Stable - Low risk of patient condition declining or worsening    Shift Goals  Clinical Goals: No further signs of PTL  Patient Goals: Stay pregnant  Family Goals: Support    Progress made toward(s) clinical / shift goals:    Fetal monitoring WNL. No contractions noted. No c/o of pain    Patient is not progressing towards the following goals:

## 2022-11-08 ENCOUNTER — APPOINTMENT (OUTPATIENT)
Dept: RADIOLOGY | Facility: MEDICAL CENTER | Age: 27
DRG: 831 | End: 2022-11-08
Payer: MEDICAID

## 2022-11-08 VITALS
TEMPERATURE: 98 F | OXYGEN SATURATION: 95 % | WEIGHT: 121 LBS | SYSTOLIC BLOOD PRESSURE: 105 MMHG | HEART RATE: 96 BPM | DIASTOLIC BLOOD PRESSURE: 57 MMHG | RESPIRATION RATE: 17 BRPM | BODY MASS INDEX: 23.63 KG/M2

## 2022-11-08 LAB
ERYTHROCYTE [DISTWIDTH] IN BLOOD BY AUTOMATED COUNT: 65.1 FL (ref 35.9–50)
HCT VFR BLD AUTO: 26.6 % (ref 37–47)
HGB BLD-MCNC: 8 G/DL (ref 12–16)
MCH RBC QN AUTO: 21.6 PG (ref 27–33)
MCHC RBC AUTO-ENTMCNC: 30.1 G/DL (ref 33.6–35)
MCV RBC AUTO: 71.7 FL (ref 81.4–97.8)
PLATELET # BLD AUTO: 226 K/UL (ref 164–446)
PMV BLD AUTO: 8.8 FL (ref 9–12.9)
RBC # BLD AUTO: 3.71 M/UL (ref 4.2–5.4)
WBC # BLD AUTO: 14.9 K/UL (ref 4.8–10.8)

## 2022-11-08 PROCEDURE — 59025 FETAL NON-STRESS TEST: CPT

## 2022-11-08 PROCEDURE — 93970 EXTREMITY STUDY: CPT

## 2022-11-08 PROCEDURE — 71046 X-RAY EXAM CHEST 2 VIEWS: CPT

## 2022-11-08 PROCEDURE — 85027 COMPLETE CBC AUTOMATED: CPT

## 2022-11-08 PROCEDURE — 36415 COLL VENOUS BLD VENIPUNCTURE: CPT

## 2022-11-08 PROCEDURE — 99239 HOSP IP/OBS DSCHRG MGMT >30: CPT | Mod: GC | Performed by: OBSTETRICS & GYNECOLOGY

## 2022-11-08 PROCEDURE — 93970 EXTREMITY STUDY: CPT | Mod: 26 | Performed by: INTERNAL MEDICINE

## 2022-11-08 RX ORDER — FERROUS SULFATE 325(65) MG
325 TABLET ORAL DAILY
Qty: 30 TABLET | Refills: 0 | Status: SHIPPED | OUTPATIENT
Start: 2022-11-08

## 2022-11-08 ASSESSMENT — FIBROSIS 4 INDEX: FIB4 SCORE: 0.83

## 2022-11-08 NOTE — PROGRESS NOTES
Shae Peguero   29w4d  Admission DX: Indication for care in labor or delivery [O75.9]    Date of Admission: 2022  Patient Active Problem List    Diagnosis Date Noted    UTI (urinary tract infection) during pregnancy, third trimester 2022     labor in third trimester without delivery 2022    Postpartum care following vaginal delivery 2021    , complete 2021    Chlamydia infection affecting pregnancy 2020       Subjective: 27-year-old  3 para 1-0-1-1 at 29 weeks 4-day gestational age who was admitted secondary to  contractions and severe anemia.  No prenatal care.  Patient did receive a transfusion at time of admission.  Steroids as well as tocolytics given.  Patient is now 48 hours from administration of betamethasone.    uterine contractions:no  pain: .no  LOF: no  vaginal Bleeding: no  fetal movement: normal    Objective:   Vitals:    22 1300 22 1955 22 0020 22 0410   BP: 111/52 102/57 111/58 (!) 81/44   Pulse: (!) 109 (!) 107 (!) 102 (!) 102   Resp: 18 17 16 17   Temp: 36.8 °C (98.2 °F) (!) 35.7 °C (96.3 °F) 36.6 °C (97.8 °F) 36.4 °C (97.6 °F)   TempSrc: Temporal Temporal Temporal Temporal   SpO2:         Fetal Non-stress Test: reactive  Gen: Alert oriented x3, no apparent distress  Membranes: ruptured: No  Abdomen: gravid, soft, NT  Ext: SCDs on, Nt, no cyanosis or clubbing    Cervix 2/50/-3    Meds:     Current Facility-Administered Medications:     ondansetron (ZOFRAN) syringe/vial injection 4 mg, 4 mg, Intravenous, Q4HRS PRN, Stella Andrews DReglaO., 4 mg at 22    influenza vaccine quad (FLUZONE/FLUARIX) injection 0.5 mL, 0.5 mL, Intramuscular, Once PRN, BERNADETTE Ibarra.O.    lactated ringers infusion, , Intravenous, Continuous, NERIS Arora, Last Rate: 125 mL/hr at 22, New Bag at 22    Labs:    Lab:   Recent Results (from the past 72 hour(s))   SERUM MAGNESIUM LEVELS     Collection Time: 22  1:20 PM   Result Value Ref Range    Magnesium 6.9 (H) 1.5 - 2.5 mg/dL   SERUM MAGNESIUM LEVELS    Collection Time: 22  8:07 PM   Result Value Ref Range    Magnesium 4.7 (H) 1.5 - 2.5 mg/dL   CBC WITHOUT DIFFERENTIAL    Collection Time: 22  8:07 PM   Result Value Ref Range    WBC 14.6 (H) 4.8 - 10.8 K/uL    RBC 4.04 (L) 4.20 - 5.40 M/uL    Hemoglobin 8.4 (L) 12.0 - 16.0 g/dL    Hematocrit 27.9 (L) 37.0 - 47.0 %    MCV 69.1 (L) 81.4 - 97.8 fL    MCH 20.8 (L) 27.0 - 33.0 pg    MCHC 30.1 (L) 33.6 - 35.0 g/dL    RDW 57.3 (H) 35.9 - 50.0 fL    Platelet Count 287 164 - 446 K/uL    MPV 8.8 (L) 9.0 - 12.9 fL       Assessment:  27 y.o.  @ 29w5d  Indication for care in labor or delivery [O75.9]    Plan: We will discontinue tocolytics at this time, monitor for contractions.  Plan to recheck cervix later on today or early tomorrow morning.  If no change and no evidence of labor or fetal distress, will discharge home

## 2022-11-08 NOTE — PROGRESS NOTES
1900: Received report from Phoebe ANDRES, plan of care discussed. Call light in reach. Pt up to shower, will call out when out and ready to be placed on the monitor.     0417: RN at bedside to assess pt due to low BP of 81/44. RN looked back on history of previous BP's, Pt normally runs low. Pt reported having mild check chest discomfort when breathing, pulse ox placed, O2Sat bouncing back and forth between 92-95%. Nassal Cannula placed, O2 running at 1L, O2Sat upt to 96-97%. Breath sounds clear.    0500: RN at bedside to reassess pt. Pt stating not longer feeling chest discomfort. Will continue to keep 1L of O2 on while sleeping.     0631: Dr. Nathan was called for a status update on pts chest discomfort and need of O2. MD planning to come to bedside this AM and assess the pt, SVE, and may send pt home. Ok to take pt off the monitor after day shift gets a reactive NST. Orders to discontinue pcn.     0700: Report given to Wei ANDRES, plan of care discussed.

## 2022-11-08 NOTE — PROGRESS NOTES
0700. Report from Debby ANDRES. POC discussed and resumed.    0710. Dr. Nathan at the bedside. SVE unchanged.    0835. O2 removed.    1230. Pt given IS to use. Shown how to use and advised to use evry couple of hours. Pt up to ambulate the hallway.    1312. Pt back to room. No c/o SOB, dizziness or chest pain. O2 sat 98%.    1615. Report to Dr. Driscoll, orders to dc pt home with  labor precautions, follow up in 1 week and pt to take iron.    1640. Pt given dc instructions with  labor precautions, follow up for 1 week and told to take iron daily. Pt also given IS to take home. Reinforced it's use. Pt states that she understands. She has no questions at this time.

## 2022-11-08 NOTE — CARE PLAN
The patient is Stable - Low risk of patient condition declining or worsening    Shift Goals  Clinical Goals: Healthy mom, healthy baby  Patient Goals: healthy mom/healthy baby  Family Goals: support    Progress made toward(s) clinical / shift goals:      Patient is not progressing towards the following goals:

## 2022-11-08 NOTE — CARE PLAN
The patient is Watcher - Medium risk of patient condition declining or worsening    Shift Goals  Clinical Goals: safe, stable pregnancy  Patient Goals: healthy mom/healthy baby  Family Goals: support    Progress made toward(s) clinical / shift goals:    Problem: Knowledge Deficit - L&D  Goal: Patient and family/caregivers will demonstrate understanding of plan of care, disease process/condition, diagnostic tests and medications  Outcome: Progressing  Note: Discuss POC w/ pt, encourage questions.     Problem: Cardiac Output  Goal: Patient will remain normotensive throughout hospitalization  Outcome: Progressing  Note: Assess for sx/s of changes in cardiac output     Problem: Risk for Fluid Imbalance  Goal: Patient's fluid volume balance will be maintained or improve  Outcome: Progressing  Note: Assess for sx/s of fluid imbalnce       Patient is not progressing towards the following goals:

## 2022-11-09 NOTE — DISCHARGE SUMMARY
Discharge Summary:     Date of Admission: 2022  Date of Discharge: 22    Admitting diagnosis:    1. Pregnancy @ 29w5d  2. Severe anemia   3.  labor     Discharge Diagnosis:   1.  labor   2. Anemia   3. UTI   4. Chlamydia     Past Medical History:   Diagnosis Date    Urinary tract infection      OB History    Para Term  AB Living   3 2 1     1   SAB IAB Ectopic Molar Multiple Live Births           0 1      # Outcome Date GA Lbr Sudeep/2nd Weight Sex Delivery Anes PTL Lv   3 Current            2 Para 21 19w5d  0 kg () M Vag-Spont None Y FD      Birth Comments: 0.15 g    1 Term 20 39w2d / 00:10 3.265 kg (7 lb 3.2 oz) F Vag-Vacuum None N HUGO     Denies surgical history     Patient has no known allergies.    Patient Active Problem List   Diagnosis    Chlamydia infection affecting pregnancy    Postpartum care following vaginal delivery    , complete    UTI (urinary tract infection) during pregnancy, third trimester     labor in third trimester without delivery     Hospital Course:   Pt is a 27 y.o.   at 29w5d who presented on 2022 for severe anemia and  labor. Patient was started on indomethacin, IV magnesium sulfate and two doses of betamethasone IM. Pt also was treated with 2 units of RBCs for a hemoglobin of 6.5. Since being treated, hemoglobin stabilized ranging from 7.8-8.4.  Patient was also found to have chlamydia and a UTI positive for Streptococcus gallolyticus sensitive to penicillin. Patient was treated with Azithromycin 1,000 mg and Keflex 500 mg respectively.    On 22 patient complained of shortness of breath and chest pain. Chest X-ray indicated atelectasis, most likely from being moderately nonambulatory since admission. Lower extremity ultrasound was unremarkable. Patient's symptoms then resolved.     Physical Exam:  Temp:  [35.7 °C (96.3 °F)-36.8 °C (98.3 °F)] 36.8 °C (98.3 °F)  Pulse:  [] 103  Resp:  [15-17]  17  BP: ()/(44-66) 112/64  SpO2:  [0 %-98 %] 95 %  Physical Exam  General: well appearing, no apparent distress  Abdomen: soft, nontender, nondistended  Perineum: Deferred  Extremities: symmetric, no peripheral edema, calves nontender    Current Facility-Administered Medications   Medication Dose    ondansetron (ZOFRAN) syringe/vial injection 4 mg  4 mg    influenza vaccine quad (FLUZONE/FLUARIX) injection 0.5 mL  0.5 mL    lactated ringers infusion         Recent Labs     11/05/22 2007 11/08/22  1147   WBC 14.6* 14.9*   RBC 4.04* 3.71*   HEMOGLOBIN 8.4* 8.0*   HEMATOCRIT 27.9* 26.6*   MCV 69.1* 71.7*   MCH 20.8* 21.6*   MCHC 30.1* 30.1*   RDW 57.3* 65.1*   PLATELETCT 287 226   MPV 8.8* 8.8*     Activity/ Discharge Instructions::   Discharge to home  Patient will be started on iron supplement.   Call or come to ED for: heavy vaginal bleeding, fever >100.4, severe abdominal pain, severe headache, or worsening chest pain, shortness of breath.    Follow up:  [facility] next week for follow up     Discharge Meds:   Current Outpatient Medications   Medication Sig Dispense Refill    ferrous sulfate 325 (65 Fe) MG tablet Take 1 Tablet by mouth every day. 30 Tablet 0     Kasia Fernandes M.D. PGY-1

## 2022-12-05 ENCOUNTER — NON-PROVIDER VISIT (OUTPATIENT)
Dept: URGENT CARE | Facility: PHYSICIAN GROUP | Age: 27
End: 2022-12-05

## 2022-12-05 DIAGNOSIS — Z02.1 PRE-EMPLOYMENT DRUG SCREENING: Primary | ICD-10-CM

## 2022-12-05 LAB
AMP AMPHETAMINE: NORMAL
COC COCAINE: NORMAL
INT CON NEG: NORMAL
INT CON POS: NORMAL
MET METHAMPHETAMINES: NORMAL
OPI OPIATES: NORMAL
PCP PHENCYCLIDINE: NORMAL
POC DRUG COMMENT 753798-OCCUPATIONAL HEALTH: NEGATIVE
THC: NORMAL

## 2022-12-05 PROCEDURE — 80305 DRUG TEST PRSMV DIR OPT OBS: CPT | Performed by: STUDENT IN AN ORGANIZED HEALTH CARE EDUCATION/TRAINING PROGRAM

## 2022-12-09 ENCOUNTER — HOSPITAL ENCOUNTER (EMERGENCY)
Facility: MEDICAL CENTER | Age: 27
End: 2022-12-09
Attending: OBSTETRICS & GYNECOLOGY | Admitting: OBSTETRICS & GYNECOLOGY
Payer: MEDICAID

## 2022-12-09 VITALS
SYSTOLIC BLOOD PRESSURE: 101 MMHG | DIASTOLIC BLOOD PRESSURE: 62 MMHG | OXYGEN SATURATION: 96 % | BODY MASS INDEX: 22.84 KG/M2 | TEMPERATURE: 97.4 F | RESPIRATION RATE: 16 BRPM | WEIGHT: 121 LBS | HEART RATE: 98 BPM | HEIGHT: 61 IN

## 2022-12-09 LAB
APPEARANCE UR: CLEAR
COLOR UR AUTO: YELLOW
GLUCOSE UR QL STRIP.AUTO: NEGATIVE MG/DL
KETONES UR QL STRIP.AUTO: NEGATIVE MG/DL
LEUKOCYTE ESTERASE UR QL STRIP.AUTO: ABNORMAL
NITRITE UR QL STRIP.AUTO: NEGATIVE
PH UR STRIP.AUTO: 7 [PH] (ref 5–8)
PROT UR QL STRIP: NEGATIVE MG/DL
RBC UR QL AUTO: ABNORMAL
SP GR UR STRIP.AUTO: 1.01 (ref 1–1.03)

## 2022-12-09 PROCEDURE — 302449 STATCHG TRIAGE ONLY (STATISTIC)

## 2022-12-09 PROCEDURE — 59025 FETAL NON-STRESS TEST: CPT | Mod: 26 | Performed by: NURSE PRACTITIONER

## 2022-12-09 PROCEDURE — 99281 EMR DPT VST MAYX REQ PHY/QHP: CPT | Mod: 25 | Performed by: NURSE PRACTITIONER

## 2022-12-09 PROCEDURE — 81002 URINALYSIS NONAUTO W/O SCOPE: CPT

## 2022-12-09 PROCEDURE — 59025 FETAL NON-STRESS TEST: CPT

## 2022-12-09 ASSESSMENT — PAIN SCALES - GENERAL
PAINLEVEL: 0 - NO PAIN
PAINLEVEL: 0 - NO PAIN

## 2022-12-09 ASSESSMENT — FIBROSIS 4 INDEX: FIB4 SCORE: 0.83

## 2022-12-10 NOTE — PROGRESS NOTES
1905 Report received from DMITRY Jeffrey. POC discussed, questions answered.    2000 Keira Oakley CNM phoned and given report on patient. Discharge orders received.     2024 Discharge instructions given including PTL precautions, and when to return to the hospital. Patient also educated on the importance of establishing with an OBGYN. Pt verbalizes understanding at this time. All questions answered. Pt ambulated off the unit with FOB and personal belongings in place.

## 2022-12-10 NOTE — ED PROVIDER NOTES
"Emergency Obstetric Consultation     Date of Service  2022      PATIENT ID:  NAME:  Shae Peguero  MRN:               [id number]  YOB: 1995     27 y.o. female  at 34w1d.    Subjective: Pt reports CTX 15-20 minutes apart.   Pregnancy complicated by no prenatal care, previous admission in this pregnancy for PTL. Recd betamethasone    positive  For CTXS.   positive Feels pain   negative for LOF  negative for vaginal bleeding.   positive for fetal movement    ROS: Patient denies any fever chills, nausea, vomiting, headache, chest pain, shortness of breath, or dysuria or unusual swelling of hands or feet.     Objective:    Vitals:    22 1809 22 1815   BP: 101/62 101/62   Pulse: 98 98   Resp: 16 16   Temp: 36.3 °C (97.4 °F) 36.3 °C (97.4 °F)   TempSrc: Temporal Temporal   SpO2: 96% 96%   Weight:  54.9 kg (121 lb)   Height:  1.549 m (5' 1\")     Temp (24hrs), Av.3 °C (97.4 °F), Min:36.3 °C (97.4 °F), Max:36.3 °C (97.4 °F)    General: No acute distress, resting comfortably in bed.  HEENT: normocephalic, nontraumatic, PERRLA, EOMI  Cardiovascular: Heart RRR with no murmurs, rubs or gallops. Distal Pulses 2+  Respiratory: symmetric chest expansion, lungs CTAB, with no wheezes, rales, rhonci  Abdomen: gravid, nontender  Musculoskeletal: strength 5/5 in four extremities  Neuro: non focal with no numbness, tingling or changes in sensation    Cervix:  2-3cm/70%/-2. No change from previous exam on 2022  [place]: Uterine Contractions: one seen while on monitor.   FHRM: Baseline 130, Accels to 150, no decels, moderate variability    Assessment: 27 y.o. female  at 34w1d.    NST reactive per my read    Plan:   1. Patient is cleared to return home.   2. Pt encouraged to F/U in office to establish prenatal care  3. Instructions for labor given    KeiraFRANCES Haro"

## 2022-12-10 NOTE — PROGRESS NOTES
- Pt is a  34w1d. Chief complaint of contractions Q15-20 min. VS stable. Reports no pain. Denies LOF/VB. Reports active FM.    - Report given to Niya ANDRES.

## 2023-01-09 ENCOUNTER — HOSPITAL ENCOUNTER (INPATIENT)
Facility: MEDICAL CENTER | Age: 28
LOS: 2 days | End: 2023-01-11
Attending: OBSTETRICS & GYNECOLOGY | Admitting: OBSTETRICS & GYNECOLOGY
Payer: COMMERCIAL

## 2023-01-09 LAB
ABO GROUP BLD: ABNORMAL
ANISOCYTOSIS BLD QL SMEAR: ABNORMAL
BASOPHILS # BLD AUTO: 0.4 % (ref 0–1.8)
BASOPHILS # BLD AUTO: 0.6 % (ref 0–1.8)
BASOPHILS # BLD: 0.06 K/UL (ref 0–0.12)
BASOPHILS # BLD: 0.07 K/UL (ref 0–0.12)
BLD GP AB SCN SERPL QL: ABNORMAL
COMMENT 1642: NORMAL
EOSINOPHIL # BLD AUTO: 0.03 K/UL (ref 0–0.51)
EOSINOPHIL # BLD AUTO: 0.08 K/UL (ref 0–0.51)
EOSINOPHIL NFR BLD: 0.2 % (ref 0–6.9)
EOSINOPHIL NFR BLD: 0.8 % (ref 0–6.9)
ERYTHROCYTE [DISTWIDTH] IN BLOOD BY AUTOMATED COUNT: 55 FL (ref 35.9–50)
ERYTHROCYTE [DISTWIDTH] IN BLOOD BY AUTOMATED COUNT: 55.5 FL (ref 35.9–50)
ERYTHROCYTE [DISTWIDTH] IN BLOOD BY AUTOMATED COUNT: 57.2 FL (ref 35.9–50)
HCT VFR BLD AUTO: 24.6 % (ref 37–47)
HCT VFR BLD AUTO: 31.4 % (ref 37–47)
HCT VFR BLD AUTO: 32.6 % (ref 37–47)
HGB BLD-MCNC: 7.4 G/DL (ref 12–16)
HGB BLD-MCNC: 9.2 G/DL (ref 12–16)
HGB BLD-MCNC: 9.7 G/DL (ref 12–16)
HYPOCHROMIA BLD QL SMEAR: ABNORMAL
IMM GRANULOCYTES # BLD AUTO: 0.1 K/UL (ref 0–0.11)
IMM GRANULOCYTES # BLD AUTO: 0.61 K/UL (ref 0–0.11)
IMM GRANULOCYTES NFR BLD AUTO: 1 % (ref 0–0.9)
IMM GRANULOCYTES NFR BLD AUTO: 3.1 % (ref 0–0.9)
LYMPHOCYTES # BLD AUTO: 1.74 K/UL (ref 1–4.8)
LYMPHOCYTES # BLD AUTO: 2.25 K/UL (ref 1–4.8)
LYMPHOCYTES NFR BLD: 21.5 % (ref 22–41)
LYMPHOCYTES NFR BLD: 8.9 % (ref 22–41)
MCH RBC QN AUTO: 21 PG (ref 27–33)
MCH RBC QN AUTO: 21.1 PG (ref 27–33)
MCH RBC QN AUTO: 21.2 PG (ref 27–33)
MCHC RBC AUTO-ENTMCNC: 29.3 G/DL (ref 33.6–35)
MCHC RBC AUTO-ENTMCNC: 29.8 G/DL (ref 33.6–35)
MCHC RBC AUTO-ENTMCNC: 30.1 G/DL (ref 33.6–35)
MCV RBC AUTO: 70.5 FL (ref 81.4–97.8)
MCV RBC AUTO: 70.7 FL (ref 81.4–97.8)
MCV RBC AUTO: 72.2 FL (ref 81.4–97.8)
MICROCYTES BLD QL SMEAR: ABNORMAL
MONOCYTES # BLD AUTO: 0.61 K/UL (ref 0–0.85)
MONOCYTES # BLD AUTO: 0.79 K/UL (ref 0–0.85)
MONOCYTES NFR BLD AUTO: 4 % (ref 0–13.4)
MONOCYTES NFR BLD AUTO: 5.8 % (ref 0–13.4)
MORPHOLOGY BLD-IMP: NORMAL
NEUTROPHILS # BLD AUTO: 16.33 K/UL (ref 2–7.15)
NEUTROPHILS # BLD AUTO: 7.36 K/UL (ref 2–7.15)
NEUTROPHILS NFR BLD: 70.3 % (ref 44–72)
NEUTROPHILS NFR BLD: 83.4 % (ref 44–72)
NRBC # BLD AUTO: 0 K/UL
NRBC # BLD AUTO: 0 K/UL
NRBC BLD-RTO: 0 /100 WBC
NRBC BLD-RTO: 0 /100 WBC
PLATELET # BLD AUTO: 234 K/UL (ref 164–446)
PLATELET # BLD AUTO: 273 K/UL (ref 164–446)
PLATELET # BLD AUTO: 291 K/UL (ref 164–446)
PLATELET BLD QL SMEAR: NORMAL
PMV BLD AUTO: 8.8 FL (ref 9–12.9)
PMV BLD AUTO: 8.9 FL (ref 9–12.9)
PMV BLD AUTO: 8.9 FL (ref 9–12.9)
POIKILOCYTOSIS BLD QL SMEAR: NORMAL
RBC # BLD AUTO: 3.49 M/UL (ref 4.2–5.4)
RBC # BLD AUTO: 4.35 M/UL (ref 4.2–5.4)
RBC # BLD AUTO: 4.61 M/UL (ref 4.2–5.4)
RBC BLD AUTO: PRESENT
RH BLD: ABNORMAL
T PALLIDUM AB SER QL IA: NORMAL
WBC # BLD AUTO: 10.5 K/UL (ref 4.8–10.8)
WBC # BLD AUTO: 18.6 K/UL (ref 4.8–10.8)
WBC # BLD AUTO: 19.6 K/UL (ref 4.8–10.8)

## 2023-01-09 PROCEDURE — 59409 OBSTETRICAL CARE: CPT | Performed by: PHYSICIAN ASSISTANT

## 2023-01-09 PROCEDURE — 700111 HCHG RX REV CODE 636 W/ 250 OVERRIDE (IP): Performed by: STUDENT IN AN ORGANIZED HEALTH CARE EDUCATION/TRAINING PROGRAM

## 2023-01-09 PROCEDURE — 36415 COLL VENOUS BLD VENIPUNCTURE: CPT

## 2023-01-09 PROCEDURE — 304965 HCHG RECOVERY SERVICES

## 2023-01-09 PROCEDURE — 85025 COMPLETE CBC W/AUTO DIFF WBC: CPT

## 2023-01-09 PROCEDURE — 700105 HCHG RX REV CODE 258: Performed by: PHYSICIAN ASSISTANT

## 2023-01-09 PROCEDURE — 86900 BLOOD TYPING SEROLOGIC ABO: CPT

## 2023-01-09 PROCEDURE — 700111 HCHG RX REV CODE 636 W/ 250 OVERRIDE (IP)

## 2023-01-09 PROCEDURE — 700102 HCHG RX REV CODE 250 W/ 637 OVERRIDE(OP): Performed by: PHYSICIAN ASSISTANT

## 2023-01-09 PROCEDURE — 700101 HCHG RX REV CODE 250: Performed by: PHYSICIAN ASSISTANT

## 2023-01-09 PROCEDURE — 700111 HCHG RX REV CODE 636 W/ 250 OVERRIDE (IP): Performed by: PHYSICIAN ASSISTANT

## 2023-01-09 PROCEDURE — 86780 TREPONEMA PALLIDUM: CPT

## 2023-01-09 PROCEDURE — 86850 RBC ANTIBODY SCREEN: CPT

## 2023-01-09 PROCEDURE — 85027 COMPLETE CBC AUTOMATED: CPT

## 2023-01-09 PROCEDURE — 59409 OBSTETRICAL CARE: CPT

## 2023-01-09 PROCEDURE — 700105 HCHG RX REV CODE 258: Performed by: STUDENT IN AN ORGANIZED HEALTH CARE EDUCATION/TRAINING PROGRAM

## 2023-01-09 PROCEDURE — A9270 NON-COVERED ITEM OR SERVICE: HCPCS | Performed by: PHYSICIAN ASSISTANT

## 2023-01-09 PROCEDURE — 86901 BLOOD TYPING SEROLOGIC RH(D): CPT

## 2023-01-09 PROCEDURE — 770002 HCHG ROOM/CARE - OB PRIVATE (112)

## 2023-01-09 RX ORDER — OXYTOCIN 10 [USP'U]/ML
10 INJECTION, SOLUTION INTRAMUSCULAR; INTRAVENOUS
Status: DISCONTINUED | OUTPATIENT
Start: 2023-01-09 | End: 2023-01-09 | Stop reason: HOSPADM

## 2023-01-09 RX ORDER — ACETAMINOPHEN 500 MG
1000 TABLET ORAL EVERY 6 HOURS PRN
Status: DISCONTINUED | OUTPATIENT
Start: 2023-01-09 | End: 2023-01-11 | Stop reason: HOSPADM

## 2023-01-09 RX ORDER — MISOPROSTOL 200 UG/1
800 TABLET ORAL
Status: COMPLETED | OUTPATIENT
Start: 2023-01-09 | End: 2023-01-09

## 2023-01-09 RX ORDER — TERBUTALINE SULFATE 1 MG/ML
0.25 INJECTION, SOLUTION SUBCUTANEOUS
Status: DISCONTINUED | OUTPATIENT
Start: 2023-01-09 | End: 2023-01-09 | Stop reason: HOSPADM

## 2023-01-09 RX ORDER — LIDOCAINE HYDROCHLORIDE 10 MG/ML
20 INJECTION, SOLUTION INFILTRATION; PERINEURAL
Status: DISCONTINUED | OUTPATIENT
Start: 2023-01-09 | End: 2023-01-09 | Stop reason: HOSPADM

## 2023-01-09 RX ORDER — IBUPROFEN 800 MG/1
800 TABLET ORAL
Status: COMPLETED | OUTPATIENT
Start: 2023-01-09 | End: 2023-01-09

## 2023-01-09 RX ORDER — SODIUM CHLORIDE, SODIUM LACTATE, POTASSIUM CHLORIDE, CALCIUM CHLORIDE 600; 310; 30; 20 MG/100ML; MG/100ML; MG/100ML; MG/100ML
INJECTION, SOLUTION INTRAVENOUS PRN
Status: DISCONTINUED | OUTPATIENT
Start: 2023-01-09 | End: 2023-01-11 | Stop reason: HOSPADM

## 2023-01-09 RX ORDER — METHYLERGONOVINE MALEATE 0.2 MG/ML
0.2 INJECTION INTRAVENOUS
Status: DISCONTINUED | OUTPATIENT
Start: 2023-01-09 | End: 2023-01-11 | Stop reason: HOSPADM

## 2023-01-09 RX ORDER — SODIUM CHLORIDE, SODIUM LACTATE, POTASSIUM CHLORIDE, CALCIUM CHLORIDE 600; 310; 30; 20 MG/100ML; MG/100ML; MG/100ML; MG/100ML
INJECTION, SOLUTION INTRAVENOUS CONTINUOUS
Status: DISCONTINUED | OUTPATIENT
Start: 2023-01-09 | End: 2023-01-11 | Stop reason: HOSPADM

## 2023-01-09 RX ORDER — CLINDAMYCIN PHOSPHATE 600 MG/50ML
600 INJECTION, SOLUTION INTRAVENOUS EVERY 8 HOURS
Status: DISCONTINUED | OUTPATIENT
Start: 2023-01-09 | End: 2023-01-09

## 2023-01-09 RX ORDER — CLINDAMYCIN PHOSPHATE 600 MG/50ML
600 INJECTION, SOLUTION INTRAVENOUS EVERY 8 HOURS
Status: DISCONTINUED | OUTPATIENT
Start: 2023-01-09 | End: 2023-01-10

## 2023-01-09 RX ORDER — ACETAMINOPHEN 500 MG
1000 TABLET ORAL
Status: COMPLETED | OUTPATIENT
Start: 2023-01-09 | End: 2023-01-09

## 2023-01-09 RX ORDER — METHYLERGONOVINE MALEATE 0.2 MG/ML
0.2 INJECTION INTRAVENOUS
Status: COMPLETED | OUTPATIENT
Start: 2023-01-09 | End: 2023-01-09

## 2023-01-09 RX ORDER — MISOPROSTOL 200 UG/1
600 TABLET ORAL
Status: DISCONTINUED | OUTPATIENT
Start: 2023-01-09 | End: 2023-01-11 | Stop reason: HOSPADM

## 2023-01-09 RX ORDER — IBUPROFEN 800 MG/1
800 TABLET ORAL EVERY 8 HOURS PRN
Status: DISCONTINUED | OUTPATIENT
Start: 2023-01-09 | End: 2023-01-11 | Stop reason: HOSPADM

## 2023-01-09 RX ORDER — DOCUSATE SODIUM 100 MG/1
100 CAPSULE, LIQUID FILLED ORAL 2 TIMES DAILY PRN
Status: DISCONTINUED | OUTPATIENT
Start: 2023-01-09 | End: 2023-01-11 | Stop reason: HOSPADM

## 2023-01-09 RX ADMIN — MISOPROSTOL 800 MCG: 200 TABLET ORAL at 04:11

## 2023-01-09 RX ADMIN — AMPICILLIN SODIUM 2000 MG: 2 INJECTION, POWDER, FOR SOLUTION INTRAMUSCULAR; INTRAVENOUS at 11:38

## 2023-01-09 RX ADMIN — AMPICILLIN SODIUM 2000 MG: 2 INJECTION, POWDER, FOR SOLUTION INTRAMUSCULAR; INTRAVENOUS at 20:09

## 2023-01-09 RX ADMIN — CLINDAMYCIN PHOSPHATE 600 MG: 600 INJECTION, SOLUTION INTRAVENOUS at 09:05

## 2023-01-09 RX ADMIN — METHYLERGONOVINE MALEATE 0.2 MG: 0.2 INJECTION, SOLUTION INTRAMUSCULAR; INTRAVENOUS at 04:28

## 2023-01-09 RX ADMIN — GENTAMICIN SULFATE 260 MG: 40 INJECTION, SOLUTION INTRAMUSCULAR; INTRAVENOUS at 09:57

## 2023-01-09 RX ADMIN — OXYTOCIN 20 UNITS: 10 INJECTION, SOLUTION INTRAMUSCULAR; INTRAVENOUS at 04:00

## 2023-01-09 RX ADMIN — OXYTOCIN 125 ML/HR: 10 INJECTION, SOLUTION INTRAMUSCULAR; INTRAVENOUS at 04:53

## 2023-01-09 RX ADMIN — IBUPROFEN 800 MG: 800 TABLET, FILM COATED ORAL at 04:00

## 2023-01-09 RX ADMIN — ACETAMINOPHEN 1000 MG: 500 TABLET ORAL at 03:59

## 2023-01-09 RX ADMIN — CLINDAMYCIN IN 5 PERCENT DEXTROSE 600 MG: 12 INJECTION, SOLUTION INTRAVENOUS at 17:38

## 2023-01-09 ASSESSMENT — PAIN DESCRIPTION - PAIN TYPE
TYPE: ACUTE PAIN
TYPE: ACUTE PAIN

## 2023-01-09 ASSESSMENT — LIFESTYLE VARIABLES
EVER_SMOKED: NEVER
TOTAL SCORE: 0
EVER_SMOKED: NEVER
HAVE PEOPLE ANNOYED YOU BY CRITICIZING YOUR DRINKING: NO
TOTAL SCORE: 0
EVER FELT BAD OR GUILTY ABOUT YOUR DRINKING: NO
EVER HAD A DRINK FIRST THING IN THE MORNING TO STEADY YOUR NERVES TO GET RID OF A HANGOVER: NO
HOW MANY TIMES IN THE PAST YEAR HAVE YOU HAD 5 OR MORE DRINKS IN A DAY: 0
TOTAL SCORE: 0
ALCOHOL_USE: NO
AVERAGE NUMBER OF DAYS PER WEEK YOU HAVE A DRINK CONTAINING ALCOHOL: 0
CONSUMPTION TOTAL: NEGATIVE
ON A TYPICAL DAY WHEN YOU DRINK ALCOHOL HOW MANY DRINKS DO YOU HAVE: 0
HAVE YOU EVER FELT YOU SHOULD CUT DOWN ON YOUR DRINKING: NO

## 2023-01-09 ASSESSMENT — PATIENT HEALTH QUESTIONNAIRE - PHQ9
1. LITTLE INTEREST OR PLEASURE IN DOING THINGS: NOT AT ALL
2. FEELING DOWN, DEPRESSED, IRRITABLE, OR HOPELESS: NOT AT ALL
SUM OF ALL RESPONSES TO PHQ9 QUESTIONS 1 AND 2: 0

## 2023-01-09 ASSESSMENT — FIBROSIS 4 INDEX: FIB4 SCORE: 0.87

## 2023-01-09 NOTE — PROGRESS NOTES
Assumed patient care. Took report from Lauren L&BERNADETTE RN. Assessed patient, VS stable and within defined parameters except for she has a fever of 101. Tamsen notified by labor nurse. Fundus firm at U, lochia light rubra. No pain/redness/swelling in calves.  Patient reports pain as 0/10. Oriented patient to the post partum unit including room features, scheduled medications, welcome letter, and the post partum depression scale. Educated patient on room safety and infant safety. Patient understands and verbalizes safe infant sleeping practices. Current COVID-19 mask policy gone over. Patient and support person acknowledge policy. Current visitor policy discussed. Patient and support person acknowledge policy. Call light within reach. Will continue to monitor patient's vitals.

## 2023-01-09 NOTE — PROGRESS NOTES
"Call placed to TERRY Almeida id self and patient. TERRY made aware of patient most recent temp 100.4 prior to being transported to postpartum. Tylenol and Motrin were given at 0400. Patient reports SROM 2 days ago. She states,\" I went to the bathroom and notice water coming down a lot, I did not go to the hospital because I was not having pain.\" Orders for flu panel received. Elle, will place orders for abx.   "

## 2023-01-09 NOTE — CONSULTS
Emergency Obstetric Consultation     Date of Service  2023    Reason for Consultation  No chief complaint on file.      History of Presenting Illness  28 y.o. female who presented 2023 with Reason for consult: contractionsContractions: Onset was 3-5 hours ago.    Fetal activity: Perceived fetal activity is normal.    Prenatal complications: No prenatal care, +Chlamydia - tx , no ERWIN done. Hx PTD at 19wk/IUFD  .    Review of Systems  Review of Systems   All other systems reviewed and are negative.    Obstetric History    OB History    Para Term  AB Living   3 2 1     1   SAB IAB Ectopic Molar Multiple Live Births           0 1      # Outcome Date GA Lbr Sudeep/2nd Weight Sex Delivery Anes PTL Lv   3 Current            2 Para 21 19w5d  0 kg () M Vag-Spont None Y FD      Birth Comments: 0.15 g    1 Term 20 39w2d / 00:10 3.265 kg (7 lb 3.2 oz) F Vag-Vacuum None N HUGO       Gynecologic History  No LMP recorded. Patient is pregnant.    Medical History  Past Medical History:   Diagnosis Date    Urinary tract infection        Surgical History   has no past surgical history on file.    Family History  family history includes No Known Problems in her brother, father, mother, and sister.    Social History   reports that she has never smoked. She has never used smokeless tobacco. She reports that she does not drink alcohol and does not use drugs.    Medications  Medications Prior to Admission   Medication Sig Dispense Refill Last Dose    ferrous sulfate 325 (65 Fe) MG tablet Take 1 Tablet by mouth every day. (Patient not taking: Reported on 2022) 30 Tablet 0        Allergies  No Known Allergies    Physical Exam  Maternal Exam:  Uterine Assessment: Contraction strength is firm.  Contraction frequency is regular.   Abdomen: Fetal presentation: vertex  Introitus: Normal vagina.  Cervix: Cervix evaluated by digital exam.    Per DMITRY Israel, /1Baseline rate: 120bpm.   Variability:  moderate (6-25 bpm).    Fetal State Assessment: Category I - tracings are normal.      Laboratory               No results for input(s): NTPROBNP in the last 72 hours.         Imaging  None    Assessment & Plan  Assessment:  Active phase labor.   Membrane status: intact.   Fetal well-being: normal.     Plan:  IUP at 38w4d in active labor - admit, GBS unknown, pain control, anticipate .        FRANKO Dorantes.

## 2023-01-09 NOTE — PROGRESS NOTES
"Pharmacy Kinetics 28 y.o. female on gentamicin day # 1  2023    Dosing Weight: 51.35  Currently on Gentamicin 260 mg iv q24hr    Indication for treatment: post-partum fever    Pertinent history per medical record: Admitted on 2023 for intrauterine pregnancy at 38 wk 4/7d.  Patient developed fever this morning ( 39.3) and was started on clindamycin and gentamicin x48 hours    Other antibiotics: Clindamycin 600mg IV q8h    Allergies: Patient has no known allergies.     List concerns for renal function : n/a    Pertinent cultures to date:   N/a    Recent Labs     23  0300 23  0447   WBC 10.5 19.6*   NEUTSPOLYS 70.30 83.40*     No results for input(s): BUN, CREATININE, ALBUMIN in the last 72 hours.  No results for input(s): GENTTROUGH, GENTPEAK, GENTRANDOM in the last 72 hours.  Intake/Output Summary (Last 24 hours) at 2023 0712  Last data filed at 2023 0333  Gross per 24 hour   Intake --   Output 250 ml   Net -250 ml      /67   Pulse 94   Temp (!) 38.3 °C (101 °F) (Temporal)   Resp 17   Ht 1.549 m (5' 1\")   Wt 54.9 kg (121 lb)   SpO2 95%  Temp (24hrs), Av.5 °C (99.5 °F), Min:36.4 °C (97.5 °F), Max:38.3 °C (101 °F)      A/P   Gentamicin dose change: Gentamicin 260mg IV q24h x 48h  Next gentamicin level: n/a  Goal trough: undetectable  Comments: Pharmacy to continue to monitor and adjust gentamicin per protocol.    Nelly Mueller, PharmD   "

## 2023-01-09 NOTE — PROGRESS NOTES
EDC 2023 EGA 38.4    Pt presented to L&D for c/o UC's, denies LOF, vaginal bleeding, states + fetal movement. EFM and TOCO applied. VE /1. AV Diaz, PAC notified. Orders for admission received. Pt transferred to S221, IV started, pt admitted. Report to DMITRY Aguilar

## 2023-01-10 LAB
BASOPHILS # BLD AUTO: 0.5 % (ref 0–1.8)
BASOPHILS # BLD: 0.08 K/UL (ref 0–0.12)
EOSINOPHIL # BLD AUTO: 0.15 K/UL (ref 0–0.51)
EOSINOPHIL NFR BLD: 0.9 % (ref 0–6.9)
ERYTHROCYTE [DISTWIDTH] IN BLOOD BY AUTOMATED COUNT: 55.7 FL (ref 35.9–50)
HCT VFR BLD AUTO: 24.4 % (ref 37–47)
HGB BLD-MCNC: 7.5 G/DL (ref 12–16)
IMM GRANULOCYTES # BLD AUTO: 0.18 K/UL (ref 0–0.11)
IMM GRANULOCYTES NFR BLD AUTO: 1.1 % (ref 0–0.9)
LYMPHOCYTES # BLD AUTO: 3.21 K/UL (ref 1–4.8)
LYMPHOCYTES NFR BLD: 18.8 % (ref 22–41)
MCH RBC QN AUTO: 21.6 PG (ref 27–33)
MCHC RBC AUTO-ENTMCNC: 30.7 G/DL (ref 33.6–35)
MCV RBC AUTO: 70.3 FL (ref 81.4–97.8)
MONOCYTES # BLD AUTO: 0.76 K/UL (ref 0–0.85)
MONOCYTES NFR BLD AUTO: 4.5 % (ref 0–13.4)
NEUTROPHILS # BLD AUTO: 12.66 K/UL (ref 2–7.15)
NEUTROPHILS NFR BLD: 74.2 % (ref 44–72)
NRBC # BLD AUTO: 0 K/UL
NRBC BLD-RTO: 0 /100 WBC
PLATELET # BLD AUTO: 246 K/UL (ref 164–446)
PMV BLD AUTO: 9.3 FL (ref 9–12.9)
RBC # BLD AUTO: 3.47 M/UL (ref 4.2–5.4)
WBC # BLD AUTO: 17 K/UL (ref 4.8–10.8)

## 2023-01-10 PROCEDURE — 85025 COMPLETE CBC W/AUTO DIFF WBC: CPT

## 2023-01-10 PROCEDURE — 770002 HCHG ROOM/CARE - OB PRIVATE (112)

## 2023-01-10 PROCEDURE — 700102 HCHG RX REV CODE 250 W/ 637 OVERRIDE(OP)

## 2023-01-10 PROCEDURE — 700111 HCHG RX REV CODE 636 W/ 250 OVERRIDE (IP): Performed by: STUDENT IN AN ORGANIZED HEALTH CARE EDUCATION/TRAINING PROGRAM

## 2023-01-10 PROCEDURE — 700101 HCHG RX REV CODE 250: Performed by: PHYSICIAN ASSISTANT

## 2023-01-10 PROCEDURE — 700105 HCHG RX REV CODE 258: Performed by: STUDENT IN AN ORGANIZED HEALTH CARE EDUCATION/TRAINING PROGRAM

## 2023-01-10 PROCEDURE — 700105 HCHG RX REV CODE 258: Performed by: PHYSICIAN ASSISTANT

## 2023-01-10 PROCEDURE — A9270 NON-COVERED ITEM OR SERVICE: HCPCS

## 2023-01-10 PROCEDURE — 36415 COLL VENOUS BLD VENIPUNCTURE: CPT

## 2023-01-10 PROCEDURE — 700111 HCHG RX REV CODE 636 W/ 250 OVERRIDE (IP): Performed by: PHYSICIAN ASSISTANT

## 2023-01-10 RX ORDER — FERROUS SULFATE 325(65) MG
325 TABLET ORAL
Status: DISCONTINUED | OUTPATIENT
Start: 2023-01-10 | End: 2023-01-11 | Stop reason: HOSPADM

## 2023-01-10 RX ADMIN — AMPICILLIN SODIUM 2000 MG: 2 INJECTION, POWDER, FOR SOLUTION INTRAMUSCULAR; INTRAVENOUS at 02:18

## 2023-01-10 RX ADMIN — FERROUS SULFATE TAB 325 MG (65 MG ELEMENTAL FE) 325 MG: 325 (65 FE) TAB at 08:06

## 2023-01-10 RX ADMIN — GENTAMICIN SULFATE 260 MG: 40 INJECTION, SOLUTION INTRAMUSCULAR; INTRAVENOUS at 08:04

## 2023-01-10 RX ADMIN — CLINDAMYCIN IN 5 PERCENT DEXTROSE 600 MG: 12 INJECTION, SOLUTION INTRAVENOUS at 09:40

## 2023-01-10 RX ADMIN — CLINDAMYCIN IN 5 PERCENT DEXTROSE 600 MG: 12 INJECTION, SOLUTION INTRAVENOUS at 00:37

## 2023-01-10 ASSESSMENT — EDINBURGH POSTNATAL DEPRESSION SCALE (EPDS)
I HAVE FELT SCARED OR PANICKY FOR NO GOOD REASON: NO, NOT MUCH
I HAVE BEEN SO UNHAPPY THAT I HAVE BEEN CRYING: YES, MOST OF THE TIME
THINGS HAVE BEEN GETTING ON TOP OF ME: NO, MOST OF THE TIME I HAVE COPED QUITE WELL
I HAVE LOOKED FORWARD WITH ENJOYMENT TO THINGS: AS MUCH AS I EVER DID
I HAVE BEEN ABLE TO LAUGH AND SEE THE FUNNY SIDE OF THINGS: AS MUCH AS I ALWAYS COULD
I HAVE BEEN ANXIOUS OR WORRIED FOR NO GOOD REASON: NO, NOT AT ALL
I HAVE FELT SAD OR MISERABLE: NO, NOT AT ALL
I HAVE BLAMED MYSELF UNNECESSARILY WHEN THINGS WENT WRONG: YES, SOME OF THE TIME
THE THOUGHT OF HARMING MYSELF HAS OCCURRED TO ME: NEVER
I HAVE BEEN SO UNHAPPY THAT I HAVE HAD DIFFICULTY SLEEPING: NOT AT ALL

## 2023-01-10 ASSESSMENT — PAIN DESCRIPTION - PAIN TYPE
TYPE: ACUTE PAIN
TYPE: ACUTE PAIN

## 2023-01-10 NOTE — DISCHARGE PLANNING
Discharge Planning Assessment Post Partum    Reason for Referral: No prenatal care  Address: [facility] [place] [address]  Phone: [phone]  Type of Living Situation: living with FOB and daughter  Mom Diagnosis: Pregnancy  Baby Diagnosis: [place]-38.4 weeks  Primary Language: parents speak English    Name of Baby: Sheri Peguero (: 23)  Father of the Baby: Matias Peguero (: 10/3/97)  Involved in baby’s care? Yes  Contact Information: same number as MOB's: [phone]    Prenatal Care: No, due to lack of insurance and finances  Mom's PCP: No PCP   PCP for new baby: Pediatrician list provided    Support System: FOB  Coping/Bonding between mother & baby: Yes  Source of Feeding: bottle  Supplies for Infant: prepared for infant; denies any needs    Mom's Insurance: Medicaid FFS  Baby Covered on Insurance: Yes  Mother Employed/School: No longer working  Other children in the home/names & ages: daughter-age 2 years    Financial Hardship/Income: No   Mom's Mental status: alert and oriented  Services used prior to admit: Medicaid    CPS History: No  Psychiatric History: No, MOB scored a 7 on the EPDS screen  Domestic Violence History: No  Drug/ETOH History: No, baby's UDS was negative    Resources Provided: pediatrician list, children and family resource list, post partum support and counseling resources, and a list of [facility] clinics provided to mother  Referrals Made: diaper bank referral provided     Clearance for Discharge: Infant is cleared to discharge home with parents once medically cleared

## 2023-01-10 NOTE — PROGRESS NOTES
Obstetrics & Gynecology Post-Delivery Progress Note    Date of Service: 1/10/23      28 y.o.  s/p vaginal, spontaneous  Delivery date: 23      Subjective  Pt reports feeling very well. States she is not having lightheadedness, dizziness, SOB, change of vision. Feels at baseline without fever, chills.    Pain: Well controlled  Bleeding: lochia moderate  Tolerating PO: yes  Voiding: without difficulty  Ambulating: yes  Feeding: bottlefeeding      Objective  24hr VS:  Temp:  [36.4 °C (97.6 °F)-38.3 °C (101 °F)] 36.5 °C (97.7 °F)  Pulse:  [61-94] 61  Resp:  [15-17] 16  BP: ()/(57-67) 94/57  SpO2:  [95 %-98 %] 97 %    Physical Exam  Gen: well appearing, no apparent distress, resting comfortably in bed  CV: rrr, no m/r/g  Resp: CTAB, symmetric breath sounds  Abd: soft, nontender, nondistended  Fundus: firm and at umbilicus  Incision: not applicable, (vaginal delivery)  Ext: symmetric, no peripheral edema, calves nontender    Labs:  Reviewed    Medications  ferrous sulfate, 325 mg, Oral, QDAY with Breakfast  MD Alert…Gentamicin per Pharmacy, , Other, PHARMACY TO DOSE  gentamicin (GARAMYCIN) IV, 260 mg, Intravenous, Q24HR  ampicillin, 2,000 mg, Intravenous, Q6HR  clindamycin (CLEOCIN) IV, 600 mg, Intravenous, Q8HR      PRN medications: LR, docusate sodium, ibuprofen, acetaminophen, oxytocin, misoprostol, methylergonovine      Assessment/Plan  Shae Peguero is a 28 y.o.yo  postpartum day #1  s/p vaginal, spontaneous    - Post care: meeting all goals    #Anemia  Patient with baseline anemia 2/2 recent blood loss. Patient bleeding is slowing subjectively.   -f/u CBC, transfuse Hgb <7  -iron supplement daily   -AM CBC     #Postpartum fever   Patient on ampicillin, gentamycin, clindamycin.  -continue for 48 hours     - Pain: controlled  - Method of Feeding: plans to bottle feed  - Method of Contraception:   VTE prophylaxis: none indicated    - Disposition: likely home PPD2       Lola Zaman  M.D.

## 2023-01-10 NOTE — PROGRESS NOTES
Assumed care. Assessment complete, pt resting comfortably in bed at this time. Fundus firm, lochia light. Pain controlled with prn medications per MAR. Pt denies pain at this time. Pt states voiding without difficulty. POC discussed. Call light in reach of pt, encouraged to call for assistance.

## 2023-01-10 NOTE — CARE PLAN
The patient is Watcher - Medium risk of patient condition declining or worsening    Shift Goals  Clinical Goals: VSS, fundus and lochia WNL, rest  Patient Goals: rest, bond with infant    Progress made toward(s) clinical / shift goals: Pt VS stable throughout shift. Fundus firm, lochia scant. Pt resting and bonding with infant.     Patient is not progressing towards the following goals: NA

## 2023-01-11 ENCOUNTER — PHARMACY VISIT (OUTPATIENT)
Dept: PHARMACY | Facility: MEDICAL CENTER | Age: 28
End: 2023-01-11
Payer: MEDICARE

## 2023-01-11 VITALS
DIASTOLIC BLOOD PRESSURE: 65 MMHG | SYSTOLIC BLOOD PRESSURE: 100 MMHG | HEIGHT: 61 IN | HEART RATE: 65 BPM | TEMPERATURE: 98 F | OXYGEN SATURATION: 97 % | WEIGHT: 121 LBS | RESPIRATION RATE: 20 BRPM | BODY MASS INDEX: 22.84 KG/M2

## 2023-01-11 LAB
BASOPHILS # BLD AUTO: 0.5 % (ref 0–1.8)
BASOPHILS # BLD: 0.07 K/UL (ref 0–0.12)
EOSINOPHIL # BLD AUTO: 0.27 K/UL (ref 0–0.51)
EOSINOPHIL NFR BLD: 1.8 % (ref 0–6.9)
ERYTHROCYTE [DISTWIDTH] IN BLOOD BY AUTOMATED COUNT: 55.4 FL (ref 35.9–50)
HCT VFR BLD AUTO: 27.1 % (ref 37–47)
HGB BLD-MCNC: 8.3 G/DL (ref 12–16)
IMM GRANULOCYTES # BLD AUTO: 0.25 K/UL (ref 0–0.11)
IMM GRANULOCYTES NFR BLD AUTO: 1.7 % (ref 0–0.9)
LYMPHOCYTES # BLD AUTO: 3.17 K/UL (ref 1–4.8)
LYMPHOCYTES NFR BLD: 21.4 % (ref 22–41)
MCH RBC QN AUTO: 21.7 PG (ref 27–33)
MCHC RBC AUTO-ENTMCNC: 30.6 G/DL (ref 33.6–35)
MCV RBC AUTO: 70.8 FL (ref 81.4–97.8)
MONOCYTES # BLD AUTO: 0.65 K/UL (ref 0–0.85)
MONOCYTES NFR BLD AUTO: 4.4 % (ref 0–13.4)
NEUTROPHILS # BLD AUTO: 10.4 K/UL (ref 2–7.15)
NEUTROPHILS NFR BLD: 70.2 % (ref 44–72)
NRBC # BLD AUTO: 0 K/UL
NRBC BLD-RTO: 0 /100 WBC
PLATELET # BLD AUTO: 276 K/UL (ref 164–446)
PMV BLD AUTO: 9.1 FL (ref 9–12.9)
RBC # BLD AUTO: 3.83 M/UL (ref 4.2–5.4)
WBC # BLD AUTO: 14.8 K/UL (ref 4.8–10.8)

## 2023-01-11 PROCEDURE — RXMED WILLOW AMBULATORY MEDICATION CHARGE

## 2023-01-11 PROCEDURE — 3E02340 INTRODUCTION OF INFLUENZA VACCINE INTO MUSCLE, PERCUTANEOUS APPROACH: ICD-10-PCS | Performed by: OBSTETRICS & GYNECOLOGY

## 2023-01-11 PROCEDURE — 90471 IMMUNIZATION ADMIN: CPT

## 2023-01-11 PROCEDURE — 90686 IIV4 VACC NO PRSV 0.5 ML IM: CPT | Performed by: OBSTETRICS & GYNECOLOGY

## 2023-01-11 PROCEDURE — 700111 HCHG RX REV CODE 636 W/ 250 OVERRIDE (IP): Performed by: OBSTETRICS & GYNECOLOGY

## 2023-01-11 PROCEDURE — 85025 COMPLETE CBC W/AUTO DIFF WBC: CPT

## 2023-01-11 PROCEDURE — 36415 COLL VENOUS BLD VENIPUNCTURE: CPT

## 2023-01-11 PROCEDURE — 700111 HCHG RX REV CODE 636 W/ 250 OVERRIDE (IP)

## 2023-01-11 RX ORDER — IBUPROFEN 800 MG/1
800 TABLET ORAL EVERY 8 HOURS PRN
Qty: 30 TABLET | Refills: 0 | Status: SHIPPED | OUTPATIENT
Start: 2023-01-11

## 2023-01-11 RX ORDER — AMOXICILLIN 250 MG
1 CAPSULE ORAL DAILY
Qty: 14 TABLET | Refills: 0 | Status: SHIPPED | OUTPATIENT
Start: 2023-01-11

## 2023-01-11 RX ORDER — ACETAMINOPHEN 500 MG
1000 TABLET ORAL EVERY 6 HOURS PRN
Qty: 30 TABLET | Refills: 0 | COMMUNITY
Start: 2023-01-11

## 2023-01-11 RX ORDER — MEDROXYPROGESTERONE ACETATE 150 MG/ML
150 INJECTION, SUSPENSION INTRAMUSCULAR ONCE
Status: COMPLETED | OUTPATIENT
Start: 2023-01-11 | End: 2023-01-11

## 2023-01-11 RX ADMIN — INFLUENZA A VIRUS A/VICTORIA/2570/2019 IVR-215 (H1N1) ANTIGEN (FORMALDEHYDE INACTIVATED), INFLUENZA A VIRUS A/DARWIN/9/2021 SAN-010 (H3N2) ANTIGEN (FORMALDEHYDE INACTIVATED), INFLUENZA B VIRUS B/PHUKET/3073/2013 ANTIGEN (FORMALDEHYDE INACTIVATED), AND INFLUENZA B VIRUS B/MICHIGAN/01/2021 ANTIGEN (FORMALDEHYDE INACTIVATED) 0.5 ML: 15; 15; 15; 15 INJECTION, SUSPENSION INTRAMUSCULAR at 13:01

## 2023-01-11 RX ADMIN — MEDROXYPROGESTERONE ACETATE 150 MG: 150 INJECTION, SUSPENSION, EXTENDED RELEASE INTRAMUSCULAR at 13:01

## 2023-01-11 NOTE — DISCHARGE SUMMARY
Discharge Summary:     Date of Admission: 2023  Date of Discharge: 23      Admitting diagnosis:    1. Pregnancy @ 38w4d  2. Lack of prenatal care   3. Chlamydia s/p treatment   4. Prior IUFD      Discharge Diagnosis:   1. Status post vaginal, spontaneous.  2. Post-partum fever  3. As above    Past Medical History:   Diagnosis Date    Urinary tract infection      OB History    Para Term  AB Living   3 3 2     2   SAB IAB Ectopic Molar Multiple Live Births           0 2      # Outcome Date GA Lbr Sudeep/2nd Weight Sex Delivery Anes PTL Lv   3 Term 23 38w4d 02:28 / 00:05 3.025 kg (6 lb 10.7 oz) F Vag-Spont None N HUGO   2 Para 21 19w5d  0 kg () M Vag-Spont None Y FD      Birth Comments: 0.15 g    1 Term 20 39w2d / 00:10 3.265 kg (7 lb 3.2 oz) F Vag-Vacuum None N HUGO     History reviewed. No pertinent surgical history.  Patient has no known allergies.    Patient Active Problem List   Diagnosis    Chlamydia infection affecting pregnancy    Postpartum care following vaginal delivery    , complete    UTI (urinary tract infection) during pregnancy, third trimester     labor in third trimester without delivery    Labor and delivery indication for care or intervention    Labor and delivery, indication for care       Hospital Course:   Pt is a 28 y.o. now  who presented on 2023 for active labor. Single female infant was delivered via  at 38w4d. Apgars 9, 9 at 1 and 5 minutes respectively.  ml.     Patient with fever 3-4 hours following delivery. Was started on ampicillin, gentamycin, clindamycin and received 24 hours of antibiotics. Patient remained fever free for 24 hours and antibiotics were stopped. Patient without further clinical or laboratory evidence of infection. Patient asymptomatic at time of discharge. Patient discharged with strict return precautions regarding signs and symptoms of infection including but not limited to increased pain,  fever, chills.    Postpartum course notable for early ambulation, well managed pain, tolerance of diet, spontaneous voiding, and appropriate feeding of infant. She has remained afebrile and blood pressure has been well controlled. All maternal questions and concerns addressed    Physical Exam:  Temp:  [36.2 °C (97.1 °F)-37 °C (98.6 °F)] 36.2 °C (97.1 °F)  Pulse:  [62-84] 70  Resp:  [16-18] 18  BP: ()/(52-67) 101/64  SpO2:  [94 %-99 %] 99 %  Physical Exam  General: well appearing, no apparent distress  Abdomen: soft, nontender, nondistended  Fundus: firm at level below umbilicus  Incision: not applicable, (vaginal delivery)  Perineum: Deferred  Extremities: symmetric, no peripheral edema, calves nontender    Current Facility-Administered Medications   Medication Dose    ferrous sulfate tablet 325 mg  325 mg    LR infusion      oxytocin (PITOCIN) infusion (for post delivery)  125 mL/hr    lactated ringers infusion      docusate sodium (COLACE) capsule 100 mg  100 mg    ibuprofen (MOTRIN) tablet 800 mg  800 mg    acetaminophen (TYLENOL) tablet 1,000 mg  1,000 mg    PRN oxytocin (PITOCIN) (20 Units/1000 mL) PRN for excessive uterine bleeding - See Admin Instr  125-999 mL/hr    miSOPROStol (CYTOTEC) tablet 600 mcg  600 mcg    methylergonovine (METHERGINE) injection 0.2 mg  0.2 mg       Recent Labs     01/09/23  1806 01/10/23  0637 01/11/23  0404   WBC 18.6* 17.0* 14.8*   RBC 3.49* 3.47* 3.83*   HEMOGLOBIN 7.4* 7.5* 8.3*   HEMATOCRIT 24.6* 24.4* 27.1*   MCV 70.5* 70.3* 70.8*   MCH 21.2* 21.6* 21.7*   MCHC 30.1* 30.7* 30.6*   RDW 55.5* 55.7* 55.4*   PLATELETCT 234 246 276   MPV 8.9* 9.3 9.1         Activity/ Discharge Instructions::   Discharge to home  Pelvic Rest x 6 weeks  No heavy lifting x4 weeks  Call or come to ED for: heavy vaginal bleeding, fever >100.4, severe abdominal pain, severe headache, chest pain, shortness of breath,  N/V, incisional drainage, or other concerns.       Follow up:  [facility] in 5 weeks for vaginal delivery; 1 week for incision check for  delivery.     Discharge Meds:   Current Outpatient Medications   Medication Sig Dispense Refill    acetaminophen (TYLENOL) 500 MG Tab Take 2 Tablets by mouth every 6 hours as needed for Mild Pain. 30 Tablet 0    sennosides-docusate sodium (SENOKOT-S) 8.6-50 MG tablet Take 1 Tablet by mouth every day. 14 Tablet 0    ibuprofen (MOTRIN) 800 MG Tab Take 1 Tablet by mouth every 8 hours as needed for Moderate Pain. 30 Tablet 0           Lola Zaman M.D.

## 2023-01-11 NOTE — PROGRESS NOTES
1900 Received report from day shift nurse.   2000 Assessment completed with no exceptions noted. Pt denies pain at this time. Place of care discussed. Pt encouraged to call nursing station for any needs.

## 2023-01-11 NOTE — PROGRESS NOTES
Meds-to-Beds: Discharge prescription orders listed below delivered to patient's bedside. DMITRY Shrestha notified. Patient counseled.      Current Outpatient Medications   Medication Sig Dispense Refill    senna-docusate (PERICOLACE OR SENOKOT S) 8.6-50 MG Tab Take 1 Tablet by mouth every day. 14 Tablet 0    ibuprofen (MOTRIN) 800 MG Tab Take 1 Tablet by mouth every 8 hours as needed for Moderate Pain. 30 Tablet 0      Eloy Be, Pharmacy Intern

## 2023-01-11 NOTE — CARE PLAN
The patient is Stable - Low risk of patient condition declining or worsening    Shift Goals  Clinical Goals: Patient will maintain stable VS; Lochia WDL; Pain WDL  Patient Goals: breastfeeding  Family Goals: rest, bonding    Progress made toward(s) clinical / shift goals:    Problem: Knowledge Deficit - L&D  Goal: Patient and family/caregivers will demonstrate understanding of plan of care, disease process/condition, diagnostic tests and medications  Outcome: Met     Problem: Risk for Excess Fluid Volume  Goal: Patient will demonstrate pulse, blood pressure and neurologic signs within expected ranges and without any respiratory complications  Outcome: Met     Problem: Pain - Standard  Goal: Alleviation of pain or a reduction in pain to the patient’s comfort goal  Outcome: Met     Problem: Knowledge Deficit - Standard  Goal: Patient and family/care givers will demonstrate understanding of plan of care, disease process/condition, diagnostic tests and medications  Outcome: Met     Problem: Knowledge Deficit - Postpartum  Goal: Patient will verbalize and demonstrate understanding of self and infant care  Outcome: Met     Problem: Psychosocial - Postpartum  Goal: Patient will verbalize and demonstrate effective bonding and parenting behavior  Outcome: Met     Problem: Altered Physiologic Condition  Goal: Patient physiologically stable as evidenced by normal lochia, palpable uterine involution and vitals within normal limits  Outcome: Met     Problem: Infection - Postpartum  Goal: Postpartum patient will be free of signs and symptoms of infection  Outcome: Met       Patient is not progressing towards the following goals:

## 2023-01-11 NOTE — DISCHARGE INSTRUCTIONS
Pelvic rest x6 weeks  Return for follow up visit in 6 weeks.  Call or come to ED for: heavy vaginal bleeding, fever >100.4, severe abdominal pain, severe headache, chest pain, shortness of breath,  N/V, or other concerns.   PATIENT DISCHARGE EDUCATION INSTRUCTION SHEET    REASONS TO CALL YOUR OBSTETRICIAN  Persistent fever, shaking, chills (Temperature higher than 100.4) may indicate you have an infection  Heavy bleeding: soaking more than 1 pad per hour; Passing clots an egg-sized clot or bigger may mean you have an postpartum hemorrhage  Foul odor from vagina or bad smelling or discolored discharge or blood  Breast infection (Mastitis symptoms); breast pain, chills, fever, redness or red streaks, may feel flu like symptoms  Urinary pain, burning or frequency  Incision that is not healing, increased redness, swelling, tenderness or pain, or any pus from episiotomy or  site may mean you have an infection  Redness, swelling, warmth, or painful to touch in the calf area of your leg may mean you have a blood clot  Severe or intensified depression, thoughts or feelings of wanting to hurt yourself or someone else   Pain in chest, obstructed breathing or shortness of breath (trouble catching your breath) may mean you are having a postpartum complication. Call your provider immediately   Headache that does not get better, even after taking medicine, a bad headache with vision changes or pain in the upper right area of your belly may mean you have high blood pressure or post birth preeclampsia. Call your provider immediately    HAND WASHING  All family and friends should wash their hands:  Before and after holding the baby  Before feeding the baby  After using the restroom or changing the baby's diaper    WOUND CARE  Ask your physician for additional care instructions. In general:  Episiotomy/Laceration  May use fred-spray bottle, witch hazel pads and dermaplast spray for comfort  Use fred-spray bottle after  urinating to cleanse perineal area  To prevent burning during urination spray fred-water bottle on labial area   Pat perineal area dry until episiotomy/laceration is healed  Continue to use fred-bottle until bleeding stops as needed  If have a 2nd degree laceration or greater, a Sitz bath can offer relief from soreness, burning, and inflammation   Sitz Bath   Sit in 6 inches of warm water and soak laceration as needed until the laceration heals    VAGINAL CARE AND BLEEDING  Nothing inside vagina for 6 weeks:   No sexual intercourse, tampons or douching  Bleeding may continue for 2-4 weeks. Amount and color may vary  Soaking 1 pad or more in an hour for several hours is considered heavy bleeding  Passing large egg sized blood clots can be concerning  If you feel like you have heavy bleeding or are having increasing amount of blood clots call your Obstetrician immediately  If you begin feeling faint upon standing, feeling sick to your stomach, have clammy skin, a really fast heartbeat, have chills, start feeling confused, dizzy, sleepy or weak, or feeling like you're going to faint call your Obstetrician immediately    HYPERTENSION   Preeclampsia or gestational hypertension are types of high blood pressure that only pregnant women can get. It is important for you to be aware of symptoms to seek early intervention and treatment. If you have any of these symptoms immediately call your Obstetrician    Vision changes or blurred vision   Severe headache or pain that is unrelieved with medication and will not go away  Persistent pain in upper abdomen or shoulder   Increased swelling of face, feet, or hands  Difficulty breathing or shortness of breath at rest  Urinating less than usual    URINATION AND BOWEL MOVEMENTS  Eating more fiber (bran cereal, fruits, and vegetables) and drinking plenty of fluids will help to avoid constipation  Urinary frequency and urgency after childbirth is normal  If you experience any urinary  "pain, burning or frequency call your provider    BIRTH CONTROL  It is possible to become pregnant at any time after delivery and while breastfeeding  Plan to discuss a method of birth control with your physician at your post delivery follow up visit    POSTPARTUM BLUES  During the first few days after birth, you may experience a sense of the \"blues\" which may include impatience, irritability or even crying. These feelings come and go quickly. However, as many as 1 in 10 women experience emotional symptoms known as postpartum depression.     POSTPARTUM DEPRESSION    May start as early as the second or third day after delivery or take several weeks or months to develop. Symptoms of \"blues\" are present, but are more intense: Crying spells; loss of appetite; feelings of hopelessness or loss of control; fear of touching the baby; over concern or no concern at all about the baby; little or no concern about your own appearance/caring for yourself; and/or inability to sleep or excessive sleeping. Contact your Obstetrician if you are experiencing any of these symptoms     PREVENTING SHAKEN BABY  If you are angry or stressed, PUT THE BABY IN THE CRIB, step away, take some deep breaths, and wait until you are calm to care for the baby. DO NOT SHAKE THE BABY. You are not alone, call a supporter for help.  [facility] 24/7 crisis call line (222-967-7287) or (1-350.487.4138)  You can also text them, text \"ANSWER\" (260391)    "

## 2023-01-11 NOTE — CARE PLAN
Problem: Risk for Venous Thromboembolism (VTE)  Goal: VTE prevention measures will be implemented and patient will remain free from VTE  Outcome: Progressing     Problem: Risk for Fluid Imbalance  Goal: Patient's fluid volume balance will be maintained or improve  Outcome: Progressing   The patient is Stable - Low risk of patient condition declining or worsening    Shift Goals  Clinical Goals: pain control, breastfeeding  Patient Goals: breastfeeding  Family Goals: rest, bonding    Progress made toward(s) clinical / shift goals:  Pt was encouraged to ambulate within the room and on the unit as a measure to prevent VTE. Pt has been encouraged to intake fluids to maintain proper hydration.

## 2023-01-11 NOTE — PROGRESS NOTES
0715 Assessment completed. Lochia light, fundus firm. Plan of care reviewed. Declines pain intervention at this time, will call if pain intervention needed.    1300 Discharge instructions and education reviewed with patient, verbalized understanding, papers signed.   1400 Left facility escorted by staff.

## 2023-05-17 NOTE — DISCHARGE PLANNING
Birth certificate has been completed.   Pt returned to unit from dialysis reported by nurse that 1000 ml was pulled off pt during dialysis

## 2024-04-09 ENCOUNTER — APPOINTMENT (OUTPATIENT)
Dept: RADIOLOGY | Facility: MEDICAL CENTER | Age: 29
End: 2024-04-09
Attending: NURSE PRACTITIONER
Payer: MEDICAID

## 2024-04-09 ENCOUNTER — APPOINTMENT (OUTPATIENT)
Dept: RADIOLOGY | Facility: MEDICAL CENTER | Age: 29
End: 2024-04-09
Attending: FAMILY MEDICINE
Payer: MEDICAID

## 2024-04-09 ENCOUNTER — HOSPITAL ENCOUNTER (INPATIENT)
Facility: MEDICAL CENTER | Age: 29
LOS: 3 days | End: 2024-04-12
Attending: FAMILY MEDICINE | Admitting: FAMILY MEDICINE
Payer: MEDICAID

## 2024-04-09 PROBLEM — O09.899 SHORT INTERVAL BETWEEN PREGNANCIES AFFECTING PREGNANCY, ANTEPARTUM: Status: ACTIVE | Noted: 2024-04-09

## 2024-04-09 PROBLEM — A74.9 CHLAMYDIA INFECTION AFFECTING PREGNANCY: Status: RESOLVED | Noted: 2020-09-01 | Resolved: 2024-04-09

## 2024-04-09 PROBLEM — O09.899 HISTORY OF MATERNAL CHLAMYDIA INFECTION, CURRENTLY PREGNANT: Status: ACTIVE | Noted: 2024-04-09

## 2024-04-09 PROBLEM — O03.9 ABORTION, COMPLETE: Status: RESOLVED | Noted: 2021-07-19 | Resolved: 2024-04-09

## 2024-04-09 PROBLEM — O98.819 CHLAMYDIA INFECTION AFFECTING PREGNANCY: Status: RESOLVED | Noted: 2020-09-01 | Resolved: 2024-04-09

## 2024-04-09 PROBLEM — O23.43 UTI (URINARY TRACT INFECTION) DURING PREGNANCY, THIRD TRIMESTER: Status: RESOLVED | Noted: 2022-11-05 | Resolved: 2024-04-09

## 2024-04-09 PROBLEM — O60.03 PRETERM LABOR IN THIRD TRIMESTER WITHOUT DELIVERY: Status: RESOLVED | Noted: 2022-11-05 | Resolved: 2024-04-09

## 2024-04-09 LAB
ABO GROUP BLD: NORMAL
AMPHET UR QL SCN: NEGATIVE
ANISOCYTOSIS BLD QL SMEAR: ABNORMAL
BARBITURATES UR QL SCN: NEGATIVE
BARCODED ABORH UBTYP: 5100
BARCODED ABORH UBTYP: 5100
BARCODED PRD CODE UBPRD: NORMAL
BARCODED PRD CODE UBPRD: NORMAL
BARCODED UNIT NUM UBUNT: NORMAL
BARCODED UNIT NUM UBUNT: NORMAL
BASOPHILS # BLD AUTO: 0.3 % (ref 0–1.8)
BASOPHILS # BLD: 0.04 K/UL (ref 0–0.12)
BENZODIAZ UR QL SCN: NEGATIVE
BLD GP AB SCN SERPL QL: NORMAL
BLD GP AB SCN SERPL QL: NORMAL
BZE UR QL SCN: NEGATIVE
CANNABINOIDS UR QL SCN: NEGATIVE
COMPONENT R 8504R: NORMAL
COMPONENT R 8504R: NORMAL
EOSINOPHIL # BLD AUTO: 0.01 K/UL (ref 0–0.51)
EOSINOPHIL NFR BLD: 0.1 % (ref 0–6.9)
ERYTHROCYTE [DISTWIDTH] IN BLOOD BY AUTOMATED COUNT: 49.4 FL (ref 35.9–50)
EXTRA TUBE BLU BLU: NORMAL
FENTANYL UR QL: NEGATIVE
GP B STREP DNA SPEC QL NAA+PROBE: NEGATIVE
HBV SURFACE AG SER QL: ABNORMAL
HCT VFR BLD AUTO: 32.1 % (ref 37–47)
HCV AB SER QL: ABNORMAL
HGB BLD-MCNC: 9.2 G/DL (ref 12–16)
HIV 1+2 AB+HIV1 P24 AG SERPL QL IA: NORMAL
HYPOCHROMIA BLD QL SMEAR: ABNORMAL
IMM GRANULOCYTES # BLD AUTO: 0.15 K/UL (ref 0–0.11)
IMM GRANULOCYTES NFR BLD AUTO: 1 % (ref 0–0.9)
LYMPHOCYTES # BLD AUTO: 1.41 K/UL (ref 1–4.8)
LYMPHOCYTES NFR BLD: 9.4 % (ref 22–41)
MCH RBC QN AUTO: 19.2 PG (ref 27–33)
MCHC RBC AUTO-ENTMCNC: 28.7 G/DL (ref 32.2–35.5)
MCV RBC AUTO: 66.9 FL (ref 81.4–97.8)
METHADONE UR QL SCN: NEGATIVE
MICROCYTES BLD QL SMEAR: ABNORMAL
MONOCYTES # BLD AUTO: 0.61 K/UL (ref 0–0.85)
MONOCYTES NFR BLD AUTO: 4 % (ref 0–13.4)
MORPHOLOGY BLD-IMP: NORMAL
NEUTROPHILS # BLD AUTO: 12.86 K/UL (ref 1.82–7.42)
NEUTROPHILS NFR BLD: 85.2 % (ref 44–72)
NRBC # BLD AUTO: 0 K/UL
NRBC BLD-RTO: 0 /100 WBC (ref 0–0.2)
OPIATES UR QL SCN: NEGATIVE
OXYCODONE UR QL SCN: NEGATIVE
PCP UR QL SCN: NEGATIVE
PLATELET # BLD AUTO: 281 K/UL (ref 164–446)
PLATELET BLD QL SMEAR: NORMAL
PMV BLD AUTO: 9.4 FL (ref 9–12.9)
PRODUCT TYPE UPROD: NORMAL
PRODUCT TYPE UPROD: NORMAL
PROPOXYPH UR QL SCN: NEGATIVE
RBC # BLD AUTO: 4.8 M/UL (ref 4.2–5.4)
RBC BLD AUTO: PRESENT
RH BLD: NORMAL
RUBV AB SER QL: 36 IU/ML
T PALLIDUM AB SER QL IA: ABNORMAL
UNIT STATUS USTAT: NORMAL
UNIT STATUS USTAT: NORMAL
WBC # BLD AUTO: 15.1 K/UL (ref 4.8–10.8)

## 2024-04-09 PROCEDURE — 87150 DNA/RNA AMPLIFIED PROBE: CPT

## 2024-04-09 PROCEDURE — 86900 BLOOD TYPING SEROLOGIC ABO: CPT

## 2024-04-09 PROCEDURE — 36415 COLL VENOUS BLD VENIPUNCTURE: CPT

## 2024-04-09 PROCEDURE — 87081 CULTURE SCREEN ONLY: CPT

## 2024-04-09 PROCEDURE — 86901 BLOOD TYPING SEROLOGIC RH(D): CPT

## 2024-04-09 PROCEDURE — 87653 STREP B DNA AMP PROBE: CPT

## 2024-04-09 PROCEDURE — 86592 SYPHILIS TEST NON-TREP QUAL: CPT

## 2024-04-09 PROCEDURE — 87340 HEPATITIS B SURFACE AG IA: CPT

## 2024-04-09 PROCEDURE — 83021 HEMOGLOBIN CHROMOTOGRAPHY: CPT

## 2024-04-09 PROCEDURE — 86780 TREPONEMA PALLIDUM: CPT

## 2024-04-09 PROCEDURE — 86850 RBC ANTIBODY SCREEN: CPT

## 2024-04-09 PROCEDURE — 99282 EMERGENCY DEPT VISIT SF MDM: CPT

## 2024-04-09 PROCEDURE — 76816 OB US FOLLOW-UP PER FETUS: CPT

## 2024-04-09 PROCEDURE — 86803 HEPATITIS C AB TEST: CPT

## 2024-04-09 PROCEDURE — 87389 HIV-1 AG W/HIV-1&-2 AB AG IA: CPT

## 2024-04-09 PROCEDURE — 770002 HCHG ROOM/CARE - OB PRIVATE (112)

## 2024-04-09 PROCEDURE — 85025 COMPLETE CBC W/AUTO DIFF WBC: CPT

## 2024-04-09 PROCEDURE — 80307 DRUG TEST PRSMV CHEM ANLYZR: CPT

## 2024-04-09 PROCEDURE — 86762 RUBELLA ANTIBODY: CPT

## 2024-04-09 RX ORDER — ONDANSETRON 4 MG/1
4 TABLET, ORALLY DISINTEGRATING ORAL EVERY 6 HOURS PRN
Status: DISCONTINUED | OUTPATIENT
Start: 2024-04-09 | End: 2024-04-11 | Stop reason: HOSPADM

## 2024-04-09 RX ORDER — OXYTOCIN 10 [USP'U]/ML
10 INJECTION, SOLUTION INTRAMUSCULAR; INTRAVENOUS
Status: DISCONTINUED | OUTPATIENT
Start: 2024-04-09 | End: 2024-04-11 | Stop reason: HOSPADM

## 2024-04-09 RX ORDER — TERBUTALINE SULFATE 1 MG/ML
0.25 INJECTION, SOLUTION SUBCUTANEOUS
Status: DISCONTINUED | OUTPATIENT
Start: 2024-04-09 | End: 2024-04-11

## 2024-04-09 RX ORDER — ONDANSETRON 2 MG/ML
4 INJECTION INTRAMUSCULAR; INTRAVENOUS EVERY 6 HOURS PRN
Status: DISCONTINUED | OUTPATIENT
Start: 2024-04-09 | End: 2024-04-11 | Stop reason: HOSPADM

## 2024-04-09 RX ORDER — MISOPROSTOL 200 UG/1
800 TABLET ORAL
Status: DISCONTINUED | OUTPATIENT
Start: 2024-04-09 | End: 2024-04-12 | Stop reason: HOSPADM

## 2024-04-09 RX ORDER — ACETAMINOPHEN 500 MG
1000 TABLET ORAL
Status: DISCONTINUED | OUTPATIENT
Start: 2024-04-09 | End: 2024-04-11

## 2024-04-09 RX ORDER — SODIUM CHLORIDE, SODIUM LACTATE, POTASSIUM CHLORIDE, CALCIUM CHLORIDE 600; 310; 30; 20 MG/100ML; MG/100ML; MG/100ML; MG/100ML
INJECTION, SOLUTION INTRAVENOUS CONTINUOUS
Status: DISCONTINUED | OUTPATIENT
Start: 2024-04-09 | End: 2024-04-12 | Stop reason: HOSPADM

## 2024-04-09 RX ORDER — LIDOCAINE HYDROCHLORIDE 10 MG/ML
20 INJECTION, SOLUTION INFILTRATION; PERINEURAL
Status: DISCONTINUED | OUTPATIENT
Start: 2024-04-09 | End: 2024-04-11

## 2024-04-09 RX ORDER — CARBOPROST TROMETHAMINE 250 UG/ML
250 INJECTION, SOLUTION INTRAMUSCULAR
Status: DISCONTINUED | OUTPATIENT
Start: 2024-04-09 | End: 2024-04-11

## 2024-04-09 RX ORDER — METHYLERGONOVINE MALEATE 0.2 MG/ML
0.2 INJECTION INTRAVENOUS PRN
Status: COMPLETED | OUTPATIENT
Start: 2024-04-09 | End: 2024-04-10

## 2024-04-09 RX ORDER — IBUPROFEN 800 MG/1
800 TABLET ORAL
Status: DISCONTINUED | OUTPATIENT
Start: 2024-04-09 | End: 2024-04-11 | Stop reason: HOSPADM

## 2024-04-09 ASSESSMENT — PATIENT HEALTH QUESTIONNAIRE - PHQ9
2. FEELING DOWN, DEPRESSED, IRRITABLE, OR HOPELESS: NOT AT ALL
1. LITTLE INTEREST OR PLEASURE IN DOING THINGS: NOT AT ALL
SUM OF ALL RESPONSES TO PHQ9 QUESTIONS 1 AND 2: 0

## 2024-04-09 ASSESSMENT — LIFESTYLE VARIABLES
TOTAL SCORE: 0
CONSUMPTION TOTAL: INCOMPLETE
HAVE PEOPLE ANNOYED YOU BY CRITICIZING YOUR DRINKING: NO
EVER_SMOKED: NEVER
TOTAL SCORE: 0
ALCOHOL_USE: NO
HAVE YOU EVER FELT YOU SHOULD CUT DOWN ON YOUR DRINKING: NO
EVER HAD A DRINK FIRST THING IN THE MORNING TO STEADY YOUR NERVES TO GET RID OF A HANGOVER: NO
TOTAL SCORE: 0
EVER FELT BAD OR GUILTY ABOUT YOUR DRINKING: NO
DOES PATIENT WANT TO STOP DRINKING: NO

## 2024-04-09 ASSESSMENT — PAIN DESCRIPTION - PAIN TYPE
TYPE: ACUTE PAIN

## 2024-04-09 ASSESSMENT — FIBROSIS 4 INDEX: FIB4 SCORE: 0.74

## 2024-04-09 NOTE — H&P
"Admission H&P      Shae Peguero is a 29 y.o. female  at \"term\" by stated approximation who presents for painful uterine contractions.    CC:   Chief Complaint   Patient presents with    Contractions     Subjective:   Pt arrived for painful uterine contractions. She has not had any prenatal care this pregnancy, or her other pregnancies per patient due to lack of insurance. She has h/o 19w demise.  x3. Pt stated she had fast labor last time, delivered in less than hour from arrival. Denies h/o bleeding or infection. She does have h/o microcytic anemia and would accept blood transfusion if indicated. Pt did not track her LMP, but \"feels\" that she is due this month. This baby feels similar size to her prior babies, largest 3200g.    Uterine contractions affirms  Leakage of fluid affirms  vaginal bleeding denies  fetal movement affirms    ROS:  GEN: denies fever, chills  HEENT: denies headache, denies blurry vision  CV: denies chest pain or palpitations, BLE edema none  RESP: denies chest pain or shortness of breath  ABD: denies RUQ pain  : denies dysuria    I personally reviewed the past medical and surgical histories, as well as the problem list.    No prenatal care with following problems:  Patient Active Problem List    Diagnosis Date Noted    Short interval between pregnancies affecting pregnancy, antepartum 2024    History of maternal chlamydia infection, currently pregnant 2024    Indication for care in labor or delivery 2024    Labor and delivery, indication for care 2023       Past Medical History:   Diagnosis Date    Urinary tract infection      No past surgical history on file.  Family History   Problem Relation Age of Onset    No Known Problems Mother     No Known Problems Father     No Known Problems Sister     No Known Problems Brother      OB History    Para Term  AB Living   4 3 2     2   SAB IAB Ectopic Molar Multiple Live Births           0 2    "   # Outcome Date GA Lbr Sudeep/2nd Weight Sex Delivery Anes PTL Lv   4 Current            3 Term 01/09/23 38w4d 02:28 / 00:05 3.025 kg (6 lb 10.7 oz) F Vag-Spont None N HUGO   2 Para 07/19/21 19w5d  0 kg () M Vag-Spont None Y FD      Birth Comments: 0.15 g    1 Term 12/24/20 39w2d / 00:10 3.265 kg (7 lb 3.2 oz) F Vag-Vacuum None N HUGO     Social History     Socioeconomic History    Marital status: Single     Spouse name: Not on file    Number of children: Not on file    Years of education: Not on file    Highest education level: Not on file   Occupational History    Not on file   Tobacco Use    Smoking status: Never    Smokeless tobacco: Never   Vaping Use    Vaping Use: Never used   Substance and Sexual Activity    Alcohol use: Never    Drug use: Never    Sexual activity: Yes     Partners: Male     Comment: none   Other Topics Concern    Not on file   Social History Narrative    Not on file     Social Determinants of Health     Financial Resource Strain: Not on file   Food Insecurity: Not on file   Transportation Needs: Not on file   Physical Activity: Not on file   Stress: Not on file   Social Connections: Not on file   Intimate Partner Violence: Not on file   Housing Stability: Not on file     No Known Allergies     Current Facility-Administered Medications:     LR infusion, , Intravenous, Continuous, Grace Hurtado M.D.    lidocaine (XYLOCAINE) 1%  injection, 20 mL, Subcutaneous, Once PRN, Grace Hurtado M.D.    terbutaline (Brethine) injection 0.25 mg, 0.25 mg, Subcutaneous, Once PRN, Grace Hurtado M.D.    oxytocin (Pitocin) infusion bolus (for post delivery), 20 Units, Intravenous, Once **FOLLOWED BY** oxytocin (Pitocin) infusion (for post delivery), 125 mL/hr, Intravenous, Continuous, Grace Hurtado M.D.    oxytocin (Pitocin) injection 10 Units, 10 Units, Intramuscular, Once PRN, Grace Hurtado M.D.    ibuprofen (Motrin) tablet 800 mg, 800 mg, Oral, Once PRN, Grace Hurtado M.D.    acetaminophen (Tylenol) tablet  "1,000 mg, 1,000 mg, Oral, Once PRN, Grace Hurtado M.D.    oxytocin (Pitocin) infusion (for induction), 0.5-20 yudith-units/min, Intravenous, Continuous, Grace Hurtado M.D.    ondansetron (Zofran ODT) dispertab 4 mg, 4 mg, Oral, Q6HRS PRN **OR** ondansetron (Zofran) syringe/vial injection 4 mg, 4 mg, Intravenous, Q6HRS PRN, Grace Hurtado M.D.      Objective:      Vitals:    24 1541   BP: 116/73   Pulse: 95   Resp: 18   Temp: 36.1 °C (97 °F)   SpO2: 98%       GEN: NAD, AAOx3, calm, cooperative, laying comfortably in bed  HEENT: NCAT, EOMI  CV: +S1S2, RRR, no BLE edema, radial pulses 2+  RESP: CTAB, no cough, breathing comfortably on RA  ABS: soft, NTTP, gravid  : no lesions  MSK: moving all extremities    SVE: 4/50/-2 by bedside nurse    FHT: Cat I, baseline 120, variability moderate, +accels, -decels, +UC q4-5min    BSUS: cephalic  Placenta: anterior    Lab Review  Lab:   Blood type: O  No results found for this or any previous visit (from the past 5880 hour(s)).        Invalid input(s): \"CBC PLTDF\", \"HIV\", \"CHLAM\"        Assessment and Plan:     Shae Pegeuro is a 29 y.o. female  at \"term\" by stated approximation who presents for painful uterine contractions.    #SIUP at term by stated approximation  - No prenatal care  - Labor status: latent labor.  - BSUS: cephalic  - prenatal labs ordered  - US ordered for EFW    #fetal status, Cat I  - reassuring  - continuous fetal monitoring    #labor induction/augmentation  - pitocin titrated to adequate contractions, if needed  - clear liquid diet  - pt can epidural when she desires  - mIVF as indicated     #no prenatal care     #h/o microcytic anemia  #COD ordered  - denies prior blood transfusion or PPH  - would accept PRBC if indicated    #GBS unk    #rubella: unk this pregnancy  - has been immune in the past  - MMR vaccine to be given postpartum if needed    #HIV unk, Trep unk, HBsAg unk, Hep C unk, GCCT unk  #h/o chlamydia  - ordered on " arrival    #blood type O+/- on prior labs    #contraception: TBD    #HCM:  - no Tdap per [employer]    Disposition: Admit to Labor      Grace Hurtado MD, MPH

## 2024-04-09 NOTE — ED PROVIDER NOTES
"OB Triage/ED Evaluation Note      Shae Peguero is a 29 y.o. female  at \"term\" by stated approximation who presents for painful uterine contractions.    CC:   Chief Complaint   Patient presents with    Contractions     Subjective:   Pt arrived for painful uterine contractions. She has not had any prenatal care this pregnancy, or her other pregnancies per patient due to lack of insurance. She has h/o 19w demise.  x3. Pt stated she had fast labor last time, delivered in less than hour from arrival. Denies h/o bleeding or infection. She does have h/o microcytic anemia and would accept blood transfusion if indicated. Pt did not track her LMP, but \"feels\" that she is due this month. This baby feels similar size to her prior babies, largest 3200g.    Uterine contractions affirms  Leakage of fluid affirms  vaginal bleeding denies  fetal movement affirms    ROS:  GEN: denies fever, chills  HEENT: denies headache, denies blurry vision  CV: denies chest pain or palpitations, BLE edema none  RESP: denies chest pain or shortness of breath  ABD: denies RUQ pain  : denies dysuria    I personally reviewed the past medical and surgical histories, as well as the problem list.    No prenatal care with following problems:  Patient Active Problem List    Diagnosis Date Noted    Short interval between pregnancies affecting pregnancy, antepartum 2024    History of maternal chlamydia infection, currently pregnant 2024    Indication for care in labor or delivery 2024    Labor and delivery, indication for care 2023       Past Medical History:   Diagnosis Date    Urinary tract infection      No past surgical history on file.  Family History   Problem Relation Age of Onset    No Known Problems Mother     No Known Problems Father     No Known Problems Sister     No Known Problems Brother      OB History    Para Term  AB Living   4 3 2     2   SAB IAB Ectopic Molar Multiple Live Births    "        0 2      # Outcome Date GA Lbr Sudeep/2nd Weight Sex Delivery Anes PTL Lv   4 Current            3 Term 01/09/23 38w4d 02:28 / 00:05 3.025 kg (6 lb 10.7 oz) F Vag-Spont None N HUGO   2 Para 07/19/21 19w5d  0 kg () M Vag-Spont None Y FD      Birth Comments: 0.15 g    1 Term 12/24/20 39w2d / 00:10 3.265 kg (7 lb 3.2 oz) F Vag-Vacuum None N HUGO     Social History     Socioeconomic History    Marital status: Single     Spouse name: Not on file    Number of children: Not on file    Years of education: Not on file    Highest education level: Not on file   Occupational History    Not on file   Tobacco Use    Smoking status: Never    Smokeless tobacco: Never   Vaping Use    Vaping Use: Never used   Substance and Sexual Activity    Alcohol use: Never    Drug use: Never    Sexual activity: Yes     Partners: Male     Comment: none   Other Topics Concern    Not on file   Social History Narrative    Not on file     Social Determinants of Health     Financial Resource Strain: Not on file   Food Insecurity: Not on file   Transportation Needs: Not on file   Physical Activity: Not on file   Stress: Not on file   Social Connections: Not on file   Intimate Partner Violence: Not on file   Housing Stability: Not on file     No Known Allergies     Current Facility-Administered Medications:     LR infusion, , Intravenous, Continuous, Grace Hurtado M.D.    lidocaine (XYLOCAINE) 1%  injection, 20 mL, Subcutaneous, Once PRN, Grace Hurtado M.D.    terbutaline (Brethine) injection 0.25 mg, 0.25 mg, Subcutaneous, Once PRN, Grace Hurtado M.D.    oxytocin (Pitocin) infusion bolus (for post delivery), 20 Units, Intravenous, Once **FOLLOWED BY** oxytocin (Pitocin) infusion (for post delivery), 125 mL/hr, Intravenous, Continuous, Grace Hurtado M.D.    oxytocin (Pitocin) injection 10 Units, 10 Units, Intramuscular, Once PRN, Grace Hurtado M.D.    ibuprofen (Motrin) tablet 800 mg, 800 mg, Oral, Once PRN, Grace Hurtado M.D.    acetaminophen  "(Tylenol) tablet 1,000 mg, 1,000 mg, Oral, Once PRN, Grace Hurtado M.D.    oxytocin (Pitocin) infusion (for induction), 0.5-20 yudith-units/min, Intravenous, Continuous, Grace Hurtado M.D.    ondansetron (Zofran ODT) dispertab 4 mg, 4 mg, Oral, Q6HRS PRN **OR** ondansetron (Zofran) syringe/vial injection 4 mg, 4 mg, Intravenous, Q6HRS PRN, Grace Hurtado M.D.      Objective:      Vitals:    24 1541   BP: 116/73   Pulse: 95   Resp: 18   Temp: 36.1 °C (97 °F)   SpO2: 98%       GEN: NAD, AAOx3, calm, cooperative, laying comfortably in bed  HEENT: NCAT, EOMI  CV: +S1S2, RRR, no BLE edema, radial pulses 2+  RESP: CTAB, no cough, breathing comfortably on RA  ABS: soft, NTTP, gravid  : no lesions  MSK: moving all extremities    SVE: 4/50/-2 by bedside nurse    FHT: Cat I, baseline 120, variability moderate, +accels, -decels, +UC q4-5min    BSUS: cephalic  Placenta: anterior    Lab Review  Lab:   Blood type: O  No results found for this or any previous visit (from the past 5880 hour(s)).        Invalid input(s): \"CBC PLTDF\", \"HIV\", \"CHLAM\"        Assessment and Plan:     Shae Peguero is a 29 y.o. female  at \"term\" by stated approximation who presents for painful uterine contractions.    #SIUP at term by stated approximation  - No prenatal care  - Labor status: latent labor.  - BSUS: cephalic  - prenatal labs ordered  - US ordered for EFW    #fetal status, Cat I  - reassuring  - continuous fetal monitoring    #labor induction/augmentation  - pitocin titrated to adequate contractions, if needed  - clear liquid diet  - pt can epidural when she desires  - mIVF as indicated     #no prenatal care     #h/o microcytic anemia  #COD ordered  - denies prior blood transfusion or PPH  - would accept PRBC if indicated    #GBS unk    #rubella: unk this pregnancy  - has been immune in the past  - MMR vaccine to be given postpartum if needed    #HIV unk, Trep unk, HBsAg unk, Hep C unk, GCCT unk  #h/o chlamydia  - " ordered on arrival    #blood type O+/- on prior labs    #contraception: TBD    #HCM:  - no Tdap per [employer]    Disposition: Admit to Labor      Grace Hurtado MD, MPH

## 2024-04-10 ENCOUNTER — ANESTHESIA EVENT (OUTPATIENT)
Dept: OBGYN | Facility: MEDICAL CENTER | Age: 29
End: 2024-04-10
Payer: MEDICAID

## 2024-04-10 ENCOUNTER — ANESTHESIA (OUTPATIENT)
Dept: OBGYN | Facility: MEDICAL CENTER | Age: 29
End: 2024-04-10
Payer: MEDICAID

## 2024-04-10 LAB
ANISOCYTOSIS BLD QL SMEAR: ABNORMAL
APTT PPP: 28.1 SEC (ref 24.7–36)
APTT PPP: 28.2 SEC (ref 24.7–36)
BARCODED ABORH UBTYP: 5100
BARCODED PRD CODE UBPRD: NORMAL
BARCODED UNIT NUM UBUNT: NORMAL
BASOPHILS # BLD AUTO: 0.2 % (ref 0–1.8)
BASOPHILS # BLD AUTO: 0.3 % (ref 0–1.8)
BASOPHILS # BLD AUTO: 0.4 % (ref 0–1.8)
BASOPHILS # BLD: 0.05 K/UL (ref 0–0.12)
BASOPHILS # BLD: 0.05 K/UL (ref 0–0.12)
BASOPHILS # BLD: 0.1 K/UL (ref 0–0.12)
CFT BLD TEG: 3.3 MIN (ref 4.6–9.1)
CFT P HPASE BLD TEG: 3.5 MIN (ref 4.3–8.3)
CLOT ANGLE BLD TEG: 76.1 DEGREES (ref 63–78)
CLOT LYSIS 30M P MA LENFR BLD TEG: 0 % (ref 0–2.6)
COMMENT 1642: NORMAL
COMPONENT FT 8504FT: NORMAL
CT.EXTRINSIC BLD ROTEM: 0.9 MIN (ref 0.8–2.1)
EOSINOPHIL # BLD AUTO: 0 K/UL (ref 0–0.51)
EOSINOPHIL NFR BLD: 0 % (ref 0–6.9)
ERYTHROCYTE [DISTWIDTH] IN BLOOD BY AUTOMATED COUNT: 50.7 FL (ref 35.9–50)
ERYTHROCYTE [DISTWIDTH] IN BLOOD BY AUTOMATED COUNT: 50.9 FL (ref 35.9–50)
ERYTHROCYTE [DISTWIDTH] IN BLOOD BY AUTOMATED COUNT: 55.8 FL (ref 35.9–50)
EXTRA TUBE BLU BLU: NORMAL
FIBRINOGEN PPP-MCNC: 312 MG/DL (ref 215–460)
FIBRINOGEN PPP-MCNC: 338 MG/DL (ref 215–460)
GP B STREP DNA SPEC QL NAA+PROBE: NEGATIVE
HCT VFR BLD AUTO: 20.8 % (ref 37–47)
HCT VFR BLD AUTO: 26.5 % (ref 37–47)
HCT VFR BLD AUTO: 27.1 % (ref 37–47)
HGB BLD-MCNC: 6.6 G/DL (ref 12–16)
HGB BLD-MCNC: 7.5 G/DL (ref 12–16)
HGB BLD-MCNC: 7.8 G/DL (ref 12–16)
HYPOCHROMIA BLD QL SMEAR: ABNORMAL
IMM GRANULOCYTES # BLD AUTO: 0.12 K/UL (ref 0–0.11)
IMM GRANULOCYTES # BLD AUTO: 0.13 K/UL (ref 0–0.11)
IMM GRANULOCYTES # BLD AUTO: 0.23 K/UL (ref 0–0.11)
IMM GRANULOCYTES NFR BLD AUTO: 0.6 % (ref 0–0.9)
IMM GRANULOCYTES NFR BLD AUTO: 0.6 % (ref 0–0.9)
IMM GRANULOCYTES NFR BLD AUTO: 0.9 % (ref 0–0.9)
INR PPP: 1.2 (ref 0.87–1.13)
INR PPP: 1.26 (ref 0.87–1.13)
LYMPHOCYTES # BLD AUTO: 1.38 K/UL (ref 1–4.8)
LYMPHOCYTES # BLD AUTO: 1.66 K/UL (ref 1–4.8)
LYMPHOCYTES # BLD AUTO: 2.67 K/UL (ref 1–4.8)
LYMPHOCYTES NFR BLD: 12.6 % (ref 22–41)
LYMPHOCYTES NFR BLD: 6.8 % (ref 22–41)
LYMPHOCYTES NFR BLD: 7.2 % (ref 22–41)
MCF BLD TEG: 66.3 MM (ref 52–69)
MCF.PLATELET INHIB BLD ROTEM: 21.2 MM (ref 15–32)
MCH RBC QN AUTO: 18.9 PG (ref 27–33)
MCH RBC QN AUTO: 21 PG (ref 27–33)
MCH RBC QN AUTO: 21.6 PG (ref 27–33)
MCHC RBC AUTO-ENTMCNC: 27.7 G/DL (ref 32.2–35.5)
MCHC RBC AUTO-ENTMCNC: 29.4 G/DL (ref 32.2–35.5)
MCHC RBC AUTO-ENTMCNC: 31.7 G/DL (ref 32.2–35.5)
MCV RBC AUTO: 68.2 FL (ref 81.4–97.8)
MCV RBC AUTO: 68.3 FL (ref 81.4–97.8)
MCV RBC AUTO: 71.2 FL (ref 81.4–97.8)
MICROCYTES BLD QL SMEAR: ABNORMAL
MONOCYTES # BLD AUTO: 0.84 K/UL (ref 0–0.85)
MONOCYTES # BLD AUTO: 1.56 K/UL (ref 0–0.85)
MONOCYTES # BLD AUTO: 1.6 K/UL (ref 0–0.85)
MONOCYTES NFR BLD AUTO: 4.4 % (ref 0–13.4)
MONOCYTES NFR BLD AUTO: 6.4 % (ref 0–13.4)
MONOCYTES NFR BLD AUTO: 7.5 % (ref 0–13.4)
MORPHOLOGY BLD-IMP: NORMAL
NEUTROPHILS # BLD AUTO: 16.79 K/UL (ref 1.82–7.42)
NEUTROPHILS # BLD AUTO: 16.88 K/UL (ref 1.82–7.42)
NEUTROPHILS # BLD AUTO: 20.87 K/UL (ref 1.82–7.42)
NEUTROPHILS NFR BLD: 79.1 % (ref 44–72)
NEUTROPHILS NFR BLD: 85.5 % (ref 44–72)
NEUTROPHILS NFR BLD: 87.5 % (ref 44–72)
NRBC # BLD AUTO: 0 K/UL
NRBC BLD-RTO: 0 /100 WBC (ref 0–0.2)
PA AA BLD-ACNC: 7 % (ref 0–11)
PA ADP BLD-ACNC: 3.2 % (ref 0–17)
PATHOLOGY CONSULT NOTE: NORMAL
PLATELET # BLD AUTO: 198 K/UL (ref 164–446)
PLATELET # BLD AUTO: 292 K/UL (ref 164–446)
PLATELET # BLD AUTO: 293 K/UL (ref 164–446)
PLATELET BLD QL SMEAR: NORMAL
PMV BLD AUTO: 9.4 FL (ref 9–12.9)
PMV BLD AUTO: 9.5 FL (ref 9–12.9)
PMV BLD AUTO: 9.6 FL (ref 9–12.9)
PRODUCT TYPE UPROD: NORMAL
PROTHROMBIN TIME: 15.4 SEC (ref 12–14.6)
PROTHROMBIN TIME: 15.9 SEC (ref 12–14.6)
RBC # BLD AUTO: 3.05 M/UL (ref 4.2–5.4)
RBC # BLD AUTO: 3.72 M/UL (ref 4.2–5.4)
RBC # BLD AUTO: 3.97 M/UL (ref 4.2–5.4)
RBC BLD AUTO: PRESENT
TEG ALGORITHM TGALG: ABNORMAL
UNIT STATUS USTAT: NORMAL
WBC # BLD AUTO: 19.3 K/UL (ref 4.8–10.8)
WBC # BLD AUTO: 21.2 K/UL (ref 4.8–10.8)
WBC # BLD AUTO: 24.4 K/UL (ref 4.8–10.8)

## 2024-04-10 PROCEDURE — 85384 FIBRINOGEN ACTIVITY: CPT | Mod: 91

## 2024-04-10 PROCEDURE — 160048 HCHG OR STATISTICAL LEVEL 1-5: Performed by: OBSTETRICS & GYNECOLOGY

## 2024-04-10 PROCEDURE — 700111 HCHG RX REV CODE 636 W/ 250 OVERRIDE (IP): Mod: JZ

## 2024-04-10 PROCEDURE — 85025 COMPLETE CBC W/AUTO DIFF WBC: CPT | Mod: 91

## 2024-04-10 PROCEDURE — 160046 HCHG PACU - 1ST 60 MINS PHASE II: Performed by: OBSTETRICS & GYNECOLOGY

## 2024-04-10 PROCEDURE — 85347 COAGULATION TIME ACTIVATED: CPT

## 2024-04-10 PROCEDURE — 302790 HCHG STAT ANTEPARTUM CARE, DAILY

## 2024-04-10 PROCEDURE — 160041 HCHG SURGERY MINUTES - EA ADDL 1 MIN LEVEL 4: Performed by: OBSTETRICS & GYNECOLOGY

## 2024-04-10 PROCEDURE — 160029 HCHG SURGERY MINUTES - 1ST 30 MINS LEVEL 4: Performed by: OBSTETRICS & GYNECOLOGY

## 2024-04-10 PROCEDURE — 85730 THROMBOPLASTIN TIME PARTIAL: CPT | Mod: 91

## 2024-04-10 PROCEDURE — 59409 OBSTETRICAL CARE: CPT

## 2024-04-10 PROCEDURE — 30233N1 TRANSFUSION OF NONAUTOLOGOUS RED BLOOD CELLS INTO PERIPHERAL VEIN, PERCUTANEOUS APPROACH: ICD-10-PCS | Performed by: OBSTETRICS & GYNECOLOGY

## 2024-04-10 PROCEDURE — 58558 HYSTEROSCOPY BIOPSY: CPT | Performed by: OBSTETRICS & GYNECOLOGY

## 2024-04-10 PROCEDURE — 700105 HCHG RX REV CODE 258: Performed by: ANESTHESIOLOGY

## 2024-04-10 PROCEDURE — 770002 HCHG ROOM/CARE - OB PRIVATE (112)

## 2024-04-10 PROCEDURE — 304965 HCHG RECOVERY SERVICES

## 2024-04-10 PROCEDURE — 700111 HCHG RX REV CODE 636 W/ 250 OVERRIDE (IP): Mod: JZ | Performed by: ANESTHESIOLOGY

## 2024-04-10 PROCEDURE — 700105 HCHG RX REV CODE 258: Performed by: NURSE PRACTITIONER

## 2024-04-10 PROCEDURE — 160009 HCHG ANES TIME/MIN: Performed by: OBSTETRICS & GYNECOLOGY

## 2024-04-10 PROCEDURE — 85610 PROTHROMBIN TIME: CPT | Mod: 91

## 2024-04-10 PROCEDURE — 85576 BLOOD PLATELET AGGREGATION: CPT | Mod: 91

## 2024-04-10 PROCEDURE — 160035 HCHG PACU - 1ST 60 MINS PHASE I: Performed by: OBSTETRICS & GYNECOLOGY

## 2024-04-10 PROCEDURE — 36430 TRANSFUSION BLD/BLD COMPNT: CPT

## 2024-04-10 PROCEDURE — A9270 NON-COVERED ITEM OR SERVICE: HCPCS | Performed by: OBSTETRICS & GYNECOLOGY

## 2024-04-10 PROCEDURE — 0W3R7ZZ CONTROL BLEEDING IN GENITOURINARY TRACT, VIA NATURAL OR ARTIFICIAL OPENING: ICD-10-PCS | Performed by: OBSTETRICS & GYNECOLOGY

## 2024-04-10 PROCEDURE — 303615 HCHG EPIDURAL/SPINAL ANESTHESIA FOR LABOR

## 2024-04-10 PROCEDURE — 160025 RECOVERY II MINUTES (STATS): Performed by: OBSTETRICS & GYNECOLOGY

## 2024-04-10 PROCEDURE — 88305 TISSUE EXAM BY PATHOLOGIST: CPT

## 2024-04-10 PROCEDURE — 10D17ZZ EXTRACTION OF PRODUCTS OF CONCEPTION, RETAINED, VIA NATURAL OR ARTIFICIAL OPENING: ICD-10-PCS | Performed by: OBSTETRICS & GYNECOLOGY

## 2024-04-10 PROCEDURE — 700105 HCHG RX REV CODE 258: Performed by: FAMILY MEDICINE

## 2024-04-10 PROCEDURE — 36415 COLL VENOUS BLD VENIPUNCTURE: CPT

## 2024-04-10 PROCEDURE — 86923 COMPATIBILITY TEST ELECTRIC: CPT

## 2024-04-10 PROCEDURE — P9017 PLASMA 1 DONOR FRZ W/IN 8 HR: HCPCS

## 2024-04-10 PROCEDURE — 59409 OBSTETRICAL CARE: CPT | Performed by: NURSE PRACTITIONER

## 2024-04-10 PROCEDURE — 160047 HCHG PACU  - EA ADDL 30 MINS PHASE II: Performed by: OBSTETRICS & GYNECOLOGY

## 2024-04-10 PROCEDURE — 700101 HCHG RX REV CODE 250: Performed by: OBSTETRICS & GYNECOLOGY

## 2024-04-10 PROCEDURE — 700102 HCHG RX REV CODE 250 W/ 637 OVERRIDE(OP): Performed by: OBSTETRICS & GYNECOLOGY

## 2024-04-10 PROCEDURE — 700111 HCHG RX REV CODE 636 W/ 250 OVERRIDE (IP): Performed by: OBSTETRICS & GYNECOLOGY

## 2024-04-10 PROCEDURE — 700105 HCHG RX REV CODE 258: Performed by: OBSTETRICS & GYNECOLOGY

## 2024-04-10 PROCEDURE — A9270 NON-COVERED ITEM OR SERVICE: HCPCS | Performed by: ANESTHESIOLOGY

## 2024-04-10 PROCEDURE — P9016 RBC LEUKOCYTES REDUCED: HCPCS

## 2024-04-10 PROCEDURE — 160002 HCHG RECOVERY MINUTES (STAT): Performed by: OBSTETRICS & GYNECOLOGY

## 2024-04-10 PROCEDURE — 700111 HCHG RX REV CODE 636 W/ 250 OVERRIDE (IP): Mod: JZ | Performed by: FAMILY MEDICINE

## 2024-04-10 PROCEDURE — 10907ZC DRAINAGE OF AMNIOTIC FLUID, THERAPEUTIC FROM PRODUCTS OF CONCEPTION, VIA NATURAL OR ARTIFICIAL OPENING: ICD-10-PCS | Performed by: NURSE PRACTITIONER

## 2024-04-10 PROCEDURE — 700102 HCHG RX REV CODE 250 W/ 637 OVERRIDE(OP): Performed by: ANESTHESIOLOGY

## 2024-04-10 PROCEDURE — 30233L1 TRANSFUSION OF NONAUTOLOGOUS FRESH PLASMA INTO PERIPHERAL VEIN, PERCUTANEOUS APPROACH: ICD-10-PCS | Performed by: OBSTETRICS & GYNECOLOGY

## 2024-04-10 RX ORDER — HYDRALAZINE HYDROCHLORIDE 20 MG/ML
5 INJECTION INTRAMUSCULAR; INTRAVENOUS
Status: DISCONTINUED | OUTPATIENT
Start: 2024-04-10 | End: 2024-04-11

## 2024-04-10 RX ORDER — CEFAZOLIN SODIUM 1 G/3ML
INJECTION, POWDER, FOR SOLUTION INTRAMUSCULAR; INTRAVENOUS PRN
Status: DISCONTINUED | OUTPATIENT
Start: 2024-04-10 | End: 2024-04-10 | Stop reason: SURG

## 2024-04-10 RX ORDER — OXYCODONE HCL 5 MG/5 ML
5 SOLUTION, ORAL ORAL
Status: DISCONTINUED | OUTPATIENT
Start: 2024-04-10 | End: 2024-04-11

## 2024-04-10 RX ORDER — SODIUM CHLORIDE, SODIUM LACTATE, POTASSIUM CHLORIDE, CALCIUM CHLORIDE 600; 310; 30; 20 MG/100ML; MG/100ML; MG/100ML; MG/100ML
INJECTION, SOLUTION INTRAVENOUS CONTINUOUS
Status: DISCONTINUED | OUTPATIENT
Start: 2024-04-10 | End: 2024-04-11

## 2024-04-10 RX ORDER — EPHEDRINE SULFATE 50 MG/ML
5 INJECTION, SOLUTION INTRAVENOUS
Status: DISCONTINUED | OUTPATIENT
Start: 2024-04-10 | End: 2024-04-11

## 2024-04-10 RX ORDER — SODIUM CHLORIDE, SODIUM GLUCONATE, SODIUM ACETATE, POTASSIUM CHLORIDE AND MAGNESIUM CHLORIDE 526; 502; 368; 37; 30 MG/100ML; MG/100ML; MG/100ML; MG/100ML; MG/100ML
INJECTION, SOLUTION INTRAVENOUS
Status: DISCONTINUED | OUTPATIENT
Start: 2024-04-10 | End: 2024-04-10 | Stop reason: SURG

## 2024-04-10 RX ORDER — HALOPERIDOL 5 MG/ML
1 INJECTION INTRAMUSCULAR
Status: DISCONTINUED | OUTPATIENT
Start: 2024-04-10 | End: 2024-04-11

## 2024-04-10 RX ORDER — SODIUM CHLORIDE, SODIUM GLUCONATE, SODIUM ACETATE, POTASSIUM CHLORIDE AND MAGNESIUM CHLORIDE 526; 502; 368; 37; 30 MG/100ML; MG/100ML; MG/100ML; MG/100ML; MG/100ML
1000 INJECTION, SOLUTION INTRAVENOUS ONCE
Status: COMPLETED | OUTPATIENT
Start: 2024-04-10 | End: 2024-04-10

## 2024-04-10 RX ORDER — HYDROMORPHONE HYDROCHLORIDE 1 MG/ML
0.2 INJECTION, SOLUTION INTRAMUSCULAR; INTRAVENOUS; SUBCUTANEOUS
Status: DISCONTINUED | OUTPATIENT
Start: 2024-04-10 | End: 2024-04-11

## 2024-04-10 RX ORDER — ONDANSETRON 2 MG/ML
4 INJECTION INTRAMUSCULAR; INTRAVENOUS
Status: DISCONTINUED | OUTPATIENT
Start: 2024-04-10 | End: 2024-04-11

## 2024-04-10 RX ORDER — ONDANSETRON 2 MG/ML
INJECTION INTRAMUSCULAR; INTRAVENOUS PRN
Status: DISCONTINUED | OUTPATIENT
Start: 2024-04-10 | End: 2024-04-10 | Stop reason: SURG

## 2024-04-10 RX ORDER — OXYCODONE HCL 5 MG/5 ML
10 SOLUTION, ORAL ORAL
Status: DISCONTINUED | OUTPATIENT
Start: 2024-04-10 | End: 2024-04-11

## 2024-04-10 RX ORDER — HYDROMORPHONE HYDROCHLORIDE 1 MG/ML
0.4 INJECTION, SOLUTION INTRAMUSCULAR; INTRAVENOUS; SUBCUTANEOUS
Status: DISCONTINUED | OUTPATIENT
Start: 2024-04-10 | End: 2024-04-11

## 2024-04-10 RX ORDER — BUPIVACAINE HYDROCHLORIDE 7.5 MG/ML
INJECTION, SOLUTION INTRASPINAL
Status: COMPLETED | OUTPATIENT
Start: 2024-04-10 | End: 2024-04-10

## 2024-04-10 RX ORDER — METHYLERGONOVINE MALEATE 0.2 MG/1
0.2 TABLET ORAL EVERY 6 HOURS
Qty: 4 TABLET | Refills: 0 | Status: COMPLETED | OUTPATIENT
Start: 2024-04-10 | End: 2024-04-11

## 2024-04-10 RX ORDER — DIPHENHYDRAMINE HYDROCHLORIDE 50 MG/ML
12.5 INJECTION INTRAMUSCULAR; INTRAVENOUS
Status: DISCONTINUED | OUTPATIENT
Start: 2024-04-10 | End: 2024-04-11

## 2024-04-10 RX ORDER — CALCIUM CARBONATE 500 MG/1
1000 TABLET, CHEWABLE ORAL DAILY
Status: DISCONTINUED | OUTPATIENT
Start: 2024-04-10 | End: 2024-04-12 | Stop reason: HOSPADM

## 2024-04-10 RX ORDER — CITRIC ACID/SODIUM CITRATE 334-500MG
30 SOLUTION, ORAL ORAL ONCE
Status: COMPLETED | OUTPATIENT
Start: 2024-04-10 | End: 2024-04-10

## 2024-04-10 RX ORDER — METOCLOPRAMIDE HYDROCHLORIDE 5 MG/ML
10 INJECTION INTRAMUSCULAR; INTRAVENOUS ONCE
Status: COMPLETED | OUTPATIENT
Start: 2024-04-10 | End: 2024-04-10

## 2024-04-10 RX ORDER — HYDROMORPHONE HYDROCHLORIDE 1 MG/ML
0.1 INJECTION, SOLUTION INTRAMUSCULAR; INTRAVENOUS; SUBCUTANEOUS
Status: DISCONTINUED | OUTPATIENT
Start: 2024-04-10 | End: 2024-04-11

## 2024-04-10 RX ADMIN — METHYLERGONOVINE MALEATE 0.2 MG: 0.2 INJECTION, SOLUTION INTRAMUSCULAR; INTRAVENOUS at 07:55

## 2024-04-10 RX ADMIN — METOCLOPRAMIDE 10 MG: 5 INJECTION, SOLUTION INTRAMUSCULAR; INTRAVENOUS at 09:42

## 2024-04-10 RX ADMIN — BUPIVACAINE HYDROCHLORIDE IN DEXTROSE 1 ML: 7.5 INJECTION, SOLUTION SUBARACHNOID at 10:02

## 2024-04-10 RX ADMIN — FAMOTIDINE 20 MG: 10 INJECTION, SOLUTION INTRAVENOUS at 09:45

## 2024-04-10 RX ADMIN — ANTACID TABLETS 1000 MG: 500 TABLET, CHEWABLE ORAL at 18:10

## 2024-04-10 RX ADMIN — PHENYLEPHRINE HYDROCHLORIDE 0.3 MCG/KG/MIN: 10 INJECTION INTRAVENOUS at 10:00

## 2024-04-10 RX ADMIN — SODIUM CHLORIDE, SODIUM GLUCONATE, SODIUM ACETATE, POTASSIUM CHLORIDE AND MAGNESIUM CHLORIDE: 526; 502; 368; 37; 30 INJECTION, SOLUTION INTRAVENOUS at 09:57

## 2024-04-10 RX ADMIN — METHYLERGONOVINE MALEATE 0.2 MG: 0.2 TABLET ORAL at 20:38

## 2024-04-10 RX ADMIN — METHYLERGONOVINE MALEATE 0.2 MG: 0.2 TABLET ORAL at 14:32

## 2024-04-10 RX ADMIN — TRANEXAMIC ACID 1000 MG: 100 INJECTION, SOLUTION INTRAVENOUS at 09:42

## 2024-04-10 RX ADMIN — FENTANYL CITRATE 15 MCG: 50 INJECTION, SOLUTION INTRAMUSCULAR; INTRAVENOUS at 10:02

## 2024-04-10 RX ADMIN — AMPICILLIN SODIUM 2000 MG: 2 INJECTION, POWDER, FOR SOLUTION INTRAVENOUS at 12:02

## 2024-04-10 RX ADMIN — SODIUM CHLORIDE, SODIUM GLUCONATE, SODIUM ACETATE, POTASSIUM CHLORIDE AND MAGNESIUM CHLORIDE 1000 ML: 526; 502; 368; 37; 30 INJECTION, SOLUTION INTRAVENOUS at 08:05

## 2024-04-10 RX ADMIN — OXYTOCIN 125 ML/HR: 10 INJECTION, SOLUTION INTRAMUSCULAR; INTRAVENOUS at 08:24

## 2024-04-10 RX ADMIN — OXYTOCIN 10 UNITS: 10 INJECTION, SOLUTION INTRAMUSCULAR; INTRAVENOUS at 07:30

## 2024-04-10 RX ADMIN — CEFAZOLIN 2 G: 1 INJECTION, POWDER, FOR SOLUTION INTRAMUSCULAR; INTRAVENOUS at 10:08

## 2024-04-10 RX ADMIN — FENTANYL CITRATE 50 MCG: 50 INJECTION, SOLUTION INTRAMUSCULAR; INTRAVENOUS at 03:54

## 2024-04-10 RX ADMIN — OXYTOCIN 125 ML/HR: 10 INJECTION, SOLUTION INTRAMUSCULAR; INTRAVENOUS at 14:22

## 2024-04-10 RX ADMIN — SODIUM CITRATE AND CITRIC ACID MONOHYDRATE 30 ML: 334; 500 SOLUTION ORAL at 09:48

## 2024-04-10 RX ADMIN — SODIUM CHLORIDE, POTASSIUM CHLORIDE, SODIUM LACTATE AND CALCIUM CHLORIDE: 600; 310; 30; 20 INJECTION, SOLUTION INTRAVENOUS at 18:41

## 2024-04-10 RX ADMIN — ONDANSETRON 4 MG: 2 INJECTION INTRAMUSCULAR; INTRAVENOUS at 10:08

## 2024-04-10 RX ADMIN — SODIUM CHLORIDE, POTASSIUM CHLORIDE, SODIUM LACTATE AND CALCIUM CHLORIDE: 600; 310; 30; 20 INJECTION, SOLUTION INTRAVENOUS at 18:08

## 2024-04-10 RX ADMIN — AMPICILLIN SODIUM 2000 MG: 2 INJECTION, POWDER, FOR SOLUTION INTRAVENOUS at 18:41

## 2024-04-10 ASSESSMENT — PAIN SCALES - GENERAL: PAIN_LEVEL: 0

## 2024-04-10 ASSESSMENT — PAIN DESCRIPTION - PAIN TYPE
TYPE: ACUTE PAIN
TYPE: ACUTE PAIN

## 2024-04-10 NOTE — PROGRESS NOTES
1900 Report from Dorothy ANDRES. POC discussed. Pt denies question or concerns. Pt encouraged to use call light for any needs.   0445 RN continuously at bedside to support labor process.   0634 Spontaneous vaginal delivery. APGARS 8/9  0639 Spontaneous delivery of an intact placenta. .   0700 Report to Leigh Long Relinquished.

## 2024-04-10 NOTE — L&D DELIVERY NOTE
VAGINAL DELIVERY NOTE:      Shae Peguero is a 29 y.o. female,  at 37w6d per US yesterday admitted for active labor.    Pregnancy complicated by no prenatal care and short interval between pregnancies.      OB History    Para Term  AB Living   4 3 2     2   SAB IAB Ectopic Molar Multiple Live Births           0 2      # Outcome Date GA Lbr Sudeep/2nd Weight Sex Delivery Anes PTL Lv   4 Current            3 Term 23 38w4d 02:28 / 00:05 3.025 kg (6 lb 10.7 oz) F Vag-Spont None N HUGO   2 Para 21 19w5d  0 kg () M Vag-Spont None Y FD      Birth Comments: 0.15 g    1 Term 20 39w2d / 00:10 3.265 kg (7 lb 3.2 oz) F Vag-Vacuum None N HUGO       GBS: negative direct, hook broth pending    VE on admission: /-2    Labor course: Pt recd IV fentanyl for pain. Labor progressed normally with AROM. Variable decels at end of 1st stage. Pushed effectively when complete      At 06:34,  of viable male infant over an intact perineum. Infant head delivered in ROT. Nuchal cord present: no. Shoulders and rest of body delivered uneventfully with slight downward traction. Cord was delayed for 90 seconds, then clamped and cut when pulsations ceased.   Apgars: 8/9  Birth weight:pending skin to skin transition    Placenta: spontaneous and intact with 3 VC at 06:39    Laceration:  Hemostatic RT labial tear, no repair needed.    Anesthesia: None  Excellent hemostasis  EBL: 350 ml  600 misoprostol rectally    Sponge and needle counts correct: yes    Tolerated procedure well  Patient and infant will be transferred to postpartum unit to recover    Keira LOPEZM, APRN  Dr Carlson is the attending MD today

## 2024-04-10 NOTE — ANESTHESIA TIME REPORT
Anesthesia Start and Stop Event Times       Date Time Event    4/10/2024 0932 Ready for Procedure     0957 Anesthesia Start     1058 Anesthesia Stop          Responsible Staff  04/10/24      Name Role Begin End    Bryson Black M.D. Anesth 0957 1058          Overtime Reason:  no overtime (within assigned shift)    Comments:

## 2024-04-10 NOTE — CARE PLAN
Problem: Pain  Goal: Patient's pain will be alleviated or reduced to the patient’s comfort goal  Flowsheets (Taken 4/9/2024 1802)  Pain Rating Scale (NPRS): 6  OB Pain Level: 1-Coping  OB Pain Intervention:   Relaxation technique   Breathing technique   The patient is Stable - Low risk of patient condition declining or worsening    Shift Goals  Clinical Goals: Dilation and delivery  Patient Goals: delivery  Family Goals: support, healthy mom, healthy baby    Progress made toward(s) clinical / shift goals:  Dilation

## 2024-04-10 NOTE — ANESTHESIA PREPROCEDURE EVALUATION
03/27/23 0052   Patient Belongings   Did you bring any home meds/supplements to the hospital?  No   Patient Belongings locker  (2backpac,1dufflebag,gr.p.j.pant,18hardcoverbooks,4boxers,sunglass,,notebook,4pen,coat,shavemist,5pants,7tshirts,hair,shorts,sandles,socks,sweater,hat,2s.shirts)   Patient Belongings Put in Hospital Secure Location (Security or Locker, etc.) clothing   Belongings Search Yes   Clothing Search Yes   Second Staff left from afternoons   Comment n/a     List items sent to [facility] keys [facility] fob,[employer]  All other belongings put in assigned cubby in belongings room.       I have reviewed my belongings list on admission and verify that it is correct.     Patient signature_______________________________    Second staff witness (if patient unable to sign) ______________________________       I have received all my belongings at discharge.    Patient signature________________________________    Brenda Cheng CNA  3/27/2023  12:57 AM        Case: [id number] Date/Time: 04/10/24 0925    Procedure: DILATION AND CURETTAGE (Vagina )    Pre-op diagnosis: Postpartum hemorrhage    Location: [facility] OR 03 / SURGERY LABOR AND DELIVERY    Surgeons: Sharona Nice D.O.            Relevant Problems   No relevant active problems       Physical Exam    Airway   Mallampati: II  TM distance: >3 FB  Neck ROM: full       Cardiovascular   Rhythm: regular  Rate: normal     Dental - normal exam           Pulmonary - normal exam     Abdominal   (-) obese     Neurological - normal exam                   Anesthesia Plan    ASA 2- EMERGENT   ASA physical status emergent criteria: acute hemorrhage    Plan - spinal   Neuraxial block will be primary anesthetic            Induction: intravenous    Postoperative Plan: Postoperative administration of opioids is intended.    Pertinent diagnostic labs and testing reviewed    Informed Consent:  Emergent - Consent given by clinician  Anesthetic plan and risks discussed with patient.    Use of blood products discussed with: patient whom consented to blood products.         EBL 1800 natural labor and delivery with postpartum hemorrhage, to OR for D+C. Plan for blood administration and spinal anesthesia.

## 2024-04-10 NOTE — PROGRESS NOTES
Report received from Lindy MASON RN. Pt resting comfortable. Second unit of PBC infusing, pt denies pain and needs at this time.     Dr Boss updated on last fundal rub and will continue to update as needed.     Pt remained form/light. Denied pain. Requested food. Dr Boss updated, pt koay to move to ante for overnight obs. Pt okay to eat. BS report given to Verna ANDRES.

## 2024-04-10 NOTE — PROGRESS NOTES
0903- Fundus nolan salinas moderate. Dr. Boss call ed to room to evaluate pt.    0917- Decision made for D&C, pt consented.    0956- Pt in OR # 3 for D&C    1055- Pt in PACU for recovery.

## 2024-04-10 NOTE — PROGRESS NOTES
1335- Marianna removed.    1405- fundus boggy, massaged until frim, moderate lochia. Dr. Nice called to BS to evaluate pt. Orders for oxytocin and oral methergine received.

## 2024-04-10 NOTE — PROGRESS NOTES
1100- Marianna suction on at 80  1200- Marianna suction off.  1257-Dr. Nice at , labs reviewed. 60 ml fluid from marianna removed. Will watch pt bleeding x 30 minutes and update MD.

## 2024-04-10 NOTE — OP REPORT
PreOp Diagnosis: 1. Postpartum hemorrhage 2. Suspected retained products of conception      PostOp Diagnosis: same      Procedure(s):  DILATION AND CURETTAGE    Surgeon(s):  Sharona Nice D.O.    Anesthesiologist/Type of Anesthesia:  Anesthesiologist: Bryson Black M.D./* No anesthesia type entered *    Surgical Staff:  Doris Kahn    Specimens removed if any:  Retained products of conception    Estimated Blood Loss: 200    Findings: NEFG, cervix still dilated and large clots and small segments of retained POCs within the uterine cavity.     Complications: none    Procedure:  The patient was taken the operating room where spinal anesthesia was obtained without any difficulty. Before procedure, patient received 600 mcg cytotec, 0.2 mg IM metheringe, 1 gm TXA. She was prepped and draped in the normal sterile fashion in the dorsal lithotomy position.  A speculum was placed in the patient's vagina and the cervix was inspected. There were no cervical lacerations present. A manual extraction of blood clots was performed. A ring forceps was placed on the anterior lip of the vervix and a size 14 Tajik curette was placed inside the uterus. Suction was turned on and once 60 mmHG was reached, a 360 circumferential sweep of the uterus was performed and a small amount of blood was evacuated from the uterus. Due to poor uterine tone, the decision was made to abandon the suction curette due to increased risk for uterine perforation. A manual extraction of uterine products was performed again. Uterus was intact. Small pieces of what appeared to be placenta and placental membrane were extracted. Once the uterine cavity was smooth, a small amount of bleeding was still appreciated. Therefore the decision was made to install the Marianna system. The tubing was connected to the vacuum canister and set vacuum to 80 mmHg. Air was removed from the cervical seal. The Marianna was inserted manually. The cervical seal was noted to be on the  outside of the cervix and was filled with 60 cc sterile fluid. The Marianna was then connected to the tube and secured to the patients inner thigh. Plan will be for Marianna to remain on active vacuu, for 1 hour before assessing if bleeding is controlled. Bedside ultrasound was performed demonstrating the correct intrauterine placement of the Marianna and no evidence of retained products of conception. TEG and coags and CBC were ordered for recovery room.The patient tolerated the procedure well.  Sponge and lap counts were correct.  The patient was taken to recovery room awake and in stable condition.    Total EBL by the time patient went to recovery was 2,000 cc.

## 2024-04-10 NOTE — PROGRESS NOTES
Called to room to assess patients bleeding. EBL approximately 1650 before arriving t room. Approximately 250 cc on peripad. Bimanual exam performed and large clots noted inside the uterus. I was able to remove a portion of the clots but ultimately the patient was too uncomfortable to tolerate any more. Therefore the decision was made to proceed to the OR for suction dilation and curettage/ evacuation of uterine clots/ possible Marianna installation. Patient was consented for procedure and expressed her consent. As patient is feeling dizzy and repeat CBC was 7.5 we will hang one unit of blood now. Will plan to repeat CBC following procedure.

## 2024-04-10 NOTE — PROGRESS NOTES
0730- Fundus form, lochia heavy with clots. Kiara pads changed.    0745- Fundus boggy with clots. BREANNE Oakley CNM notified. Will come evaluate pt.    0753- BREANNE Oakley CNM at . Clots removed. Orders for methergine, 2nd IV, fluid bolus, CBC, coagulation labs and hilton catheter received. Pt updated on above mentioned POC.    0814- Dr. Nice phoned in, updated on pt status. IV started, labs drawn, hilton inserted, methergine given. Pt fundus firm, lochia scant.

## 2024-04-10 NOTE — PROGRESS NOTES
1524: Pt presents to L&D with c/o UC. Pt is a  with hx of one FD and two vaginal births. Pt has not received prenatal care for this pregnancy.   Dr. Saunders updated on pt's status, admission order received.  : Report given to Melissa ANDRES, pt in stable condition

## 2024-04-10 NOTE — ANESTHESIA PROCEDURE NOTES
Spinal Block    Date/Time: 4/10/2024 10:02 AM    Performed by: Bryson Black M.D.  Authorized by: Bryson Black M.D.    Patient Location:  OR  Start Time:  4/10/2024 10:02 AM  Reason for Block: primary anesthetic    patient identified, IV checked, site marked, risks and benefits discussed, surgical consent, monitors and equipment checked, pre-op evaluation and timeout performed    Patient Position:  Sitting  Prep: ChloraPrep, patient draped and sterile technique    Monitoring:  Blood pressure, continuous pulse oximetry and heart rate  Approach:  Midline  Location:  L3-4  Injection Technique:  Single-shot  Skin infiltration:  Lidocaine  Strength:  1%  Dose:  3ml  Needle Type:  Pencan  Needle Gauge:  25 G  CSF flowing pre/post injection:  Yes  Sensory Level:  T8

## 2024-04-10 NOTE — PROGRESS NOTES
"S: Pt is dozing between CTX. .      O:    Vitals:    04/09/24 1541 04/09/24 1900   BP: 116/73 114/64   Pulse: 95 81   Resp: 18 16   Temp: 36.1 °C (97 °F) 36.2 °C (97.2 °F)   TempSrc: Tympanic Temporal   SpO2: 98%    Weight: 61.2 kg (135 lb)    Height: 1.549 m (5' 1\")            FHTs:  Baseline 135, + accels, - decels, moderate variability        [place]: Contractions q 2-3 minutes, firm to palpation        SVE: 6-7/80/-2     A/P:  1.  IUP @ 37w6d per US today            2.  Cat 1 FHTs             3.  Will reassess as indicated             Keira Oakley CNM, APRN    "

## 2024-04-10 NOTE — ANESTHESIA POSTPROCEDURE EVALUATION
Patient: Shae Peguero    Procedure Summary       Date: 04/10/24 Room / Location: [facility] OR 03 / SURGERY LABOR AND DELIVERY    Anesthesia Start: 0957 Anesthesia Stop: 1058    Procedure: DILATION AND CURETTAGE (Vagina ) Diagnosis: (Postpartum hemorrhage)    Surgeons: Sharona Nice D.O. Responsible Provider: Bryson Black M.D.    Anesthesia Type: spinal ASA Status: 2 - Emergent            Final Anesthesia Type: spinal  Last vitals  BP   Blood Pressure: 108/59    Temp   36.8 °C (98.3 °F)    Pulse   (!) 105   Resp   14    SpO2   98 %      Anesthesia Post Evaluation    Patient location during evaluation: PACU  Patient participation: complete - patient participated  Level of consciousness: awake  Pain score: 0    Airway patency: patent  Anesthetic complications: no  Cardiovascular status: adequate and tachycardic  Respiratory status: acceptable  Hydration status: hypovolemic    PONV: controlled    patient able to participate, but full recovery from regional anesthesia has not occurred and is not expected within the stipulated timeframe for the completion of the evaluation      No notable events documented.

## 2024-04-10 NOTE — CARE PLAN
The patient is Stable - Low risk of patient condition declining or worsening    Shift Goals  Clinical Goals: Safe Delivery.  Patient Goals: Healthy baby and mom.  Family Goals: Support.    Progress made toward(s) clinical / shift goals:  Progressing.     Problem: Knowledge Deficit - L&D  Goal: Patient and family/caregivers will demonstrate understanding of plan of care, disease process/condition, diagnostic tests and medications  4/10/2024 0227 by Melissa Duke R.N.  Outcome: Progressing  Pt continually updated and educated on ongoing POC     Problem: Pain  Goal: Patient's pain will be alleviated or reduced to the patient’s comfort goal  Outcome: Progressing   Pt states she is experiencing adequate pain control, and does not desire epidural.     Patient is not progressing towards the following goals:

## 2024-04-10 NOTE — PROGRESS NOTES
"S: Pt is breathing through CTX, in good control. Agreeable to AROM.      O:    Vitals:    04/09/24 1541 04/09/24 1900   BP: 116/73 114/64   Pulse: 95 81   Resp: 18 16   Temp: 36.1 °C (97 °F) 36.2 °C (97.2 °F)   TempSrc: Tympanic Temporal   SpO2: 98%    Weight: 61.2 kg (135 lb)    Height: 1.549 m (5' 1\")            FHTs:  Baseline 130, + accels, - decels, moderate variability        [place]: Contractions q 2-3 minutes, firm to palpation        SVE: 7/90/-1     A/P:  1.  IUP @ Unknown            2.  Cat 1 FHTs             3.  S/P AROM clear             4.  Anticipate vag delivery           5.  Will reassess as indicated    Keira Oakley CNM, APRN    "

## 2024-04-11 LAB
BASOPHILS # BLD AUTO: 0.5 % (ref 0–1.8)
BASOPHILS # BLD: 0.08 K/UL (ref 0–0.12)
EOSINOPHIL # BLD AUTO: 0.08 K/UL (ref 0–0.51)
EOSINOPHIL NFR BLD: 0.5 % (ref 0–6.9)
ERYTHROCYTE [DISTWIDTH] IN BLOOD BY AUTOMATED COUNT: 60.7 FL (ref 35.9–50)
HCT VFR BLD AUTO: 23.7 % (ref 37–47)
HGB A1 MFR BLD: 97.6 % (ref 95–97.9)
HGB A2 MFR BLD: 2.1 % (ref 2–3.5)
HGB BLD-MCNC: 7.4 G/DL (ref 12–16)
HGB C MFR BLD: 0 % (ref 0–0)
HGB E MFR BLD: 0 % (ref 0–0)
HGB F MFR BLD: 0.3 % (ref 0–2.1)
HGB FRACT BLD ELPH-IMP: NORMAL
HGB OTHER MFR BLD: 0 % (ref 0–0)
HGB S BLD QL SOLY: NORMAL
HGB S MFR BLD: 0 % (ref 0–0)
IMM GRANULOCYTES # BLD AUTO: 0.14 K/UL (ref 0–0.11)
IMM GRANULOCYTES NFR BLD AUTO: 0.8 % (ref 0–0.9)
LYMPHOCYTES # BLD AUTO: 3.31 K/UL (ref 1–4.8)
LYMPHOCYTES NFR BLD: 19.6 % (ref 22–41)
MCH RBC QN AUTO: 22.8 PG (ref 27–33)
MCHC RBC AUTO-ENTMCNC: 31.2 G/DL (ref 32.2–35.5)
MCV RBC AUTO: 72.9 FL (ref 81.4–97.8)
MONOCYTES # BLD AUTO: 0.97 K/UL (ref 0–0.85)
MONOCYTES NFR BLD AUTO: 5.7 % (ref 0–13.4)
NEUTROPHILS # BLD AUTO: 12.33 K/UL (ref 1.82–7.42)
NEUTROPHILS NFR BLD: 72.9 % (ref 44–72)
NRBC # BLD AUTO: 0 K/UL
NRBC BLD-RTO: 0 /100 WBC (ref 0–0.2)
PATH INTERP BLD-IMP: NORMAL
PLATELET # BLD AUTO: 160 K/UL (ref 164–446)
PMV BLD AUTO: 9.2 FL (ref 9–12.9)
RBC # BLD AUTO: 3.25 M/UL (ref 4.2–5.4)
WBC # BLD AUTO: 16.9 K/UL (ref 4.8–10.8)

## 2024-04-11 PROCEDURE — 36415 COLL VENOUS BLD VENIPUNCTURE: CPT

## 2024-04-11 PROCEDURE — A9270 NON-COVERED ITEM OR SERVICE: HCPCS | Performed by: OBSTETRICS & GYNECOLOGY

## 2024-04-11 PROCEDURE — 700105 HCHG RX REV CODE 258: Performed by: OBSTETRICS & GYNECOLOGY

## 2024-04-11 PROCEDURE — 700102 HCHG RX REV CODE 250 W/ 637 OVERRIDE(OP): Performed by: OBSTETRICS & GYNECOLOGY

## 2024-04-11 PROCEDURE — 700111 HCHG RX REV CODE 636 W/ 250 OVERRIDE (IP): Performed by: OBSTETRICS & GYNECOLOGY

## 2024-04-11 PROCEDURE — 770002 HCHG ROOM/CARE - OB PRIVATE (112)

## 2024-04-11 PROCEDURE — 85025 COMPLETE CBC W/AUTO DIFF WBC: CPT

## 2024-04-11 RX ORDER — MISOPROSTOL 200 UG/1
600 TABLET ORAL
Status: DISCONTINUED | OUTPATIENT
Start: 2024-04-11 | End: 2024-04-12 | Stop reason: HOSPADM

## 2024-04-11 RX ORDER — SODIUM CHLORIDE, SODIUM LACTATE, POTASSIUM CHLORIDE, CALCIUM CHLORIDE 600; 310; 30; 20 MG/100ML; MG/100ML; MG/100ML; MG/100ML
INJECTION, SOLUTION INTRAVENOUS PRN
Status: DISCONTINUED | OUTPATIENT
Start: 2024-04-11 | End: 2024-04-12 | Stop reason: HOSPADM

## 2024-04-11 RX ORDER — DOCUSATE SODIUM 100 MG/1
100 CAPSULE, LIQUID FILLED ORAL 2 TIMES DAILY PRN
Status: DISCONTINUED | OUTPATIENT
Start: 2024-04-11 | End: 2024-04-12 | Stop reason: HOSPADM

## 2024-04-11 RX ORDER — IBUPROFEN 800 MG/1
800 TABLET ORAL EVERY 8 HOURS PRN
Status: DISCONTINUED | OUTPATIENT
Start: 2024-04-11 | End: 2024-04-12 | Stop reason: HOSPADM

## 2024-04-11 RX ORDER — ACETAMINOPHEN 500 MG
1000 TABLET ORAL EVERY 6 HOURS PRN
Status: DISCONTINUED | OUTPATIENT
Start: 2024-04-11 | End: 2024-04-12 | Stop reason: HOSPADM

## 2024-04-11 RX ORDER — CARBOPROST TROMETHAMINE 250 UG/ML
250 INJECTION, SOLUTION INTRAMUSCULAR
Status: DISCONTINUED | OUTPATIENT
Start: 2024-04-11 | End: 2024-04-12 | Stop reason: HOSPADM

## 2024-04-11 RX ORDER — METHYLERGONOVINE MALEATE 0.2 MG/ML
0.2 INJECTION INTRAVENOUS
Status: DISCONTINUED | OUTPATIENT
Start: 2024-04-11 | End: 2024-04-12 | Stop reason: HOSPADM

## 2024-04-11 RX ADMIN — AMPICILLIN SODIUM 2000 MG: 2 INJECTION, POWDER, FOR SOLUTION INTRAVENOUS at 05:59

## 2024-04-11 RX ADMIN — METHYLERGONOVINE MALEATE 0.2 MG: 0.2 TABLET ORAL at 09:08

## 2024-04-11 RX ADMIN — METHYLERGONOVINE MALEATE 0.2 MG: 0.2 TABLET ORAL at 03:13

## 2024-04-11 RX ADMIN — ANTACID TABLETS 1000 MG: 500 TABLET, CHEWABLE ORAL at 05:57

## 2024-04-11 RX ADMIN — AMPICILLIN SODIUM 2000 MG: 2 INJECTION, POWDER, FOR SOLUTION INTRAVENOUS at 00:15

## 2024-04-11 ASSESSMENT — EDINBURGH POSTNATAL DEPRESSION SCALE (EPDS)
I HAVE BEEN SO UNHAPPY THAT I HAVE HAD DIFFICULTY SLEEPING: YES, SOMETIMES
THINGS HAVE BEEN GETTING ON TOP OF ME: NO, MOST OF THE TIME I HAVE COPED QUITE WELL
I HAVE FELT SAD OR MISERABLE: NO, NOT AT ALL
I HAVE BEEN ANXIOUS OR WORRIED FOR NO GOOD REASON: NO, NOT AT ALL
I HAVE LOOKED FORWARD WITH ENJOYMENT TO THINGS: AS MUCH AS I EVER DID
I HAVE FELT SCARED OR PANICKY FOR NO GOOD REASON: NO, NOT MUCH
I HAVE BEEN ABLE TO LAUGH AND SEE THE FUNNY SIDE OF THINGS: AS MUCH AS I ALWAYS COULD
I HAVE BEEN SO UNHAPPY THAT I HAVE BEEN CRYING: NO, NEVER
THE THOUGHT OF HARMING MYSELF HAS OCCURRED TO ME: NEVER
I HAVE BLAMED MYSELF UNNECESSARILY WHEN THINGS WENT WRONG: YES, SOME OF THE TIME

## 2024-04-11 ASSESSMENT — PAIN DESCRIPTION - PAIN TYPE
TYPE: ACUTE PAIN

## 2024-04-11 NOTE — PROGRESS NOTES
Obstetrics & Gynecology Post-Delivery Progress Note    Date of Service  2024    29 y.o.  1 s/p Vaginal, Spontaneous   Delivery date: 4/10/24  Breastfeeding: Unknown    Events  Patient had postpartum hemorrhage of 2,000 EBL with a starting hemoglobin of 9.2. She received methergine, hemabate, cytotec, TXA, and then maintenance methergine, along with 2 additional bags of pitocin. She received 2 units PRBC and 1 unit FFP. She did have a Marianna system that was removed 2 hours after placement.      Subjective  Pain: No  Bleeding: lochia minimal  PO's: taking regular diet  Voiding: hliton in place  Ambulating: yes  Feeding:  bottle feeding    Objective  Temp:  [35.9 °C (96.6 °F)-36.9 °C (98.4 °F)] 35.9 °C (96.6 °F)  Pulse:  [] 70  Resp:  [14-17] 16  BP: ()/(50-66) 93/59  SpO2:  [94 %-100 %] 95 %    Physical Exam  Gen: A&Ox3 NAD  CV: no edema or cyanosis  Resp: normal effort  Abd: soft, NT, FF and below U  Ext: no edema or TTP  Psych: normal mood and affect      Lab Results   Component Value Date    RBC 3.05 (L) 04/10/2024    ABOGROUP O 2024    RH POS 2024     Immunization History   Administered Date(s) Administered    Influenza Vaccine Quad Inj (Pf) 2020, 2023    MMR Vaccine 2020    Tdap Vaccine 2020       Assessment/Plan  Shae Peguero is a 29 y.o.  postpartum day 1 s/p Vaginal, Spontaneous  with massive postpartum hemorrhage.     1. Post care: meeting all goals  2. Hemodynamics: stable, awaiting CBC  3. Pain: controlled  4. PNL:   Lab Results   Component Value Date    RH POS 2024    RUBELLAIGG 36.00 2024     5. Method of Feeding: plans to bottle feed  6. Method of Contraception:  wants an OCP before discharge  7. Vaccinations:   Immunization History   Administered Date(s) Administered    Influenza Vaccine Quad Inj (Pf) 2020, 2023    MMR Vaccine 2020    Tdap Vaccine 2020     8. Disposition: likely home postpartum  day 2    No new Assessment & Plan notes have been filed under this hospital service since the last note was generated.  Service: Obstetrics & Gynecology       VTE prophylaxis: SCDs    Sharona Nice D.O.

## 2024-04-11 NOTE — CARE PLAN
The patient is Stable - Low risk of patient condition declining or worsening    Shift Goals  Clinical Goals: lochia scant. afebrile  Patient Goals: Rest  Family Goals: Support and help with baby care.    Progress made toward(s) clinical / shift goals:  vss hgb trending up.     Patient is not progressing towards the following goals:

## 2024-04-11 NOTE — DISCHARGE PLANNING
Discharge Planning Assessment Post Partum    Reason for Referral: No prenatal care   Address: [facility] [place] [address]  Phone: [phone]  Type of Living Situation: stable housing   Mom Diagnosis: Pregnancy, vaginal delivery, post partum hemorrhage   Baby Diagnosis: [place]-37.6 weeks  Primary Language: Parents speak English     Name of Baby: Lizzy Peguero (: 4/10/[birth year])  Father of the Baby: Matias Peguero (: 10/3/97)  Involved in baby’s care? Yes  Contact Information: same number as MOB's: [phone]    Prenatal Care: No.  MOB stated it was because she didn't have insurance.  Per face sheet, MOB has [place] and Medicaid FFS.  MOB stated she no longer as [place] because she is not working and was not aware that she had Medicaid coverage.  Mom's PCP: No PCP listed   PCP for new baby: Pediatrician list provided     Support System: FOB  Coping/Bonding between mother & baby: Yes  Source of Feeding: formula   Supplies for Infant: limited supplies for infant.  Discussed with parents and notified them that they will need to get a car seat before discharge.  Provided resources to [employer] Car seat program and additional resources for baby supplies.    Mom's Insurance: Medicaid FFS  Baby Covered on Insurance: Yes  Mother Employed/School: Not currently   Other children in the home/names & ages: two daughters: Dona (3) and Sheri (15 months)    Financial Hardship/Income: Neither parent is working currently   Mom's Mental status: alert and oriented   Services used prior to admit: Medicaid     CPS History: No  Psychiatric History: No   Domestic Violence History: No  Drug/ETOH History: No.  MOB's UDS is negative and infant's UDS is positive for fentanyl, which MOB received in labor.    Resources Provided: pediatrician list, children and family resource list, post partum support and counseling resources, diaper bank assistance resources, list of WIC clinics, [employer] car seat handout, baby bundle of  supplies, and 2  infant sleep sacks  Referrals Made: diaper bank referral provided      Clearance for Discharge: Infant is cleared to discharge home with parents once medically cleared

## 2024-04-11 NOTE — PROGRESS NOTES
1900: Bedside report received from Peggy DIA RN; POC and orders reviewed and discussed.  Pt has a dinner tray ordered.  Fundal rub done; no lochia noted. Pt has no complaints of pain at this time. Pt has no motion in left leg yet.  Baby and spouse at bedside.    2304: pt starting to move left leg, is able to wiggle toes but heavy to bend knees.    0545: In for medication administration, pt sitting up having a hamburger and fries.  No c/o pain atthis time.  0700: Report to Anitha LANGSTON RN; POC reviewed and discussed.

## 2024-04-11 NOTE — PROGRESS NOTES
0700: Report received from Verna ANDRES. POC discussed.     0915: Pt up to wheelchair. Pt up to postpartum via wheelchair. Report to Chris ANDRES. POC discussed.

## 2024-04-12 ENCOUNTER — PHARMACY VISIT (OUTPATIENT)
Dept: PHARMACY | Facility: MEDICAL CENTER | Age: 29
End: 2024-04-12
Payer: MEDICARE

## 2024-04-12 VITALS
HEART RATE: 66 BPM | TEMPERATURE: 97.5 F | HEIGHT: 61 IN | BODY MASS INDEX: 25.49 KG/M2 | WEIGHT: 135 LBS | DIASTOLIC BLOOD PRESSURE: 68 MMHG | SYSTOLIC BLOOD PRESSURE: 105 MMHG | RESPIRATION RATE: 16 BRPM | OXYGEN SATURATION: 95 %

## 2024-04-12 PROBLEM — O09.899 HISTORY OF MATERNAL CHLAMYDIA INFECTION, CURRENTLY PREGNANT: Status: RESOLVED | Noted: 2024-04-09 | Resolved: 2024-04-12

## 2024-04-12 PROBLEM — O09.899 SHORT INTERVAL BETWEEN PREGNANCIES AFFECTING PREGNANCY, ANTEPARTUM: Status: RESOLVED | Noted: 2024-04-09 | Resolved: 2024-04-12

## 2024-04-12 PROCEDURE — RXMED WILLOW AMBULATORY MEDICATION CHARGE

## 2024-04-12 RX ORDER — IBUPROFEN 600 MG/1
600 TABLET ORAL EVERY 8 HOURS PRN
Qty: 30 TABLET | Refills: 0 | Status: SHIPPED | OUTPATIENT
Start: 2024-04-12

## 2024-04-12 RX ORDER — MEDROXYPROGESTERONE ACETATE 150 MG/ML
150 INJECTION, SUSPENSION INTRAMUSCULAR ONCE
Status: DISCONTINUED | OUTPATIENT
Start: 2024-04-12 | End: 2024-04-12 | Stop reason: HOSPADM

## 2024-04-12 RX ORDER — FERROUS SULFATE 325(65) MG
325 TABLET ORAL
Status: DISCONTINUED | OUTPATIENT
Start: 2024-04-12 | End: 2024-04-12 | Stop reason: HOSPADM

## 2024-04-12 RX ORDER — FERROUS SULFATE 325(65) MG
325 TABLET ORAL
Qty: 90 TABLET | Refills: 3 | Status: SHIPPED | OUTPATIENT
Start: 2024-04-12

## 2024-04-12 RX ORDER — ACETAMINOPHEN 500 MG
1000 TABLET ORAL EVERY 6 HOURS PRN
Qty: 30 TABLET | Refills: 0 | Status: SHIPPED | OUTPATIENT
Start: 2024-04-12

## 2024-04-12 NOTE — CARE PLAN
Problem: Knowledge Deficit - Postpartum  Goal: Patient will verbalize and demonstrate understanding of self and infant care  4/12/2024 0233 by Maria Del Carmen Palacio R.N.  Outcome: Progressing     Problem: Altered Physiologic Condition  Goal: Patient physiologically stable as evidenced by normal lochia, palpable uterine involution and vitals within normal limits  4/12/2024 0233 by Maria Del Carmen Palacio R.N.  Outcome: Progressing   The patient is Stable - Low risk of patient condition declining or worsening    Shift Goals: pain controlled, rest  Clinical Goals: maintain normal lochia  Patient Goals: pain controlled, rest  Family Goals: support    Progress made toward(s) clinical / shift goals:  pt verbalized acceptable level of pain    Patient is not progressing towards the following goals:

## 2024-04-12 NOTE — PROGRESS NOTES
2020 Assessment done baby doing well with normal breathing, normal color, abdomen soft non distended, Vital signs remains stable, Cuddle and ID band checked.

## 2024-04-12 NOTE — DISCHARGE SUMMARY
Discharge Summary:     Date of Admission: 2024  Date of Discharge: 24      Admitting diagnosis:    1. Pregnancy @ 38w0d  2. No prenatal care  3. Microcytic anemia      Discharge Diagnosis:   1. Status post vaginal, spontaneous.  2.  Postpartum hemorrhage  3.  Status post D&C  4.  Acute blood loss anemia requiring transfusion  5.  Microcytic anemia  6.  Retained products of conception    Past Medical History:   Diagnosis Date    Urinary tract infection      OB History    Para Term  AB Living   4 4 3     3   SAB IAB Ectopic Molar Multiple Live Births           0 3      # Outcome Date GA Lbr Sudeep/2nd Weight Sex Delivery Anes PTL Lv   4 Term 04/10/24 38w0d / 00:14 3.285 kg (7 lb 3.9 oz) M Vag-Spont None N HUGO   3 Term 23 38w4d 02:28 / 00:05 3.025 kg (6 lb 10.7 oz) F Vag-Spont None N HUGO   2 Para 21 19w5d  0 kg () M Vag-Spont None Y FD      Birth Comments: 0.15 g    1 Term 20 39w2d / 00:10 3.265 kg (7 lb 3.2 oz) F Vag-Vacuum None N HUGO     Past Surgical History:   Procedure Laterality Date    DILATION AND CURETTAGE N/A 4/10/2024    Procedure: DILATION AND CURETTAGE;  Surgeon: Sharona Nice D.O.;  Location: SURGERY LABOR AND DELIVERY;  Service: Obstetrics     Patient has no known allergies.    Patient Active Problem List   Diagnosis   (none) - all problems resolved or deleted       Hospital Course:   Pt is a 29 y.o. now  who presented for painful uterine contractions.  She had no prenatal care due to lack of insurance.  Prenatal labs were gathered at presentation within normal limits.  She had artificial rupture of membranes which returned with clear fluid and on 4/10 following some variable decelerations at the end of the first stage of labor she pushed effectively and a viable male infant was born.  In the postpartum period she had hemorrhage and suspected retained products of conception.  She went in for a D&C later that morning and EBL from postpartum hemorrhage  was estimated at 2 L.  She received 2 units of packed red blood cells and 1 unit of fresh frozen plasma.  In the postoperative period her H&H was trended.  She was started on iron on 4/12 which she will also discharge on.  On day of discharge patient was reporting very light vaginal bleeding, ambulating without lightheadedness or difficulty, pain controlled, having spontaneous voids and bowel movements.  She is formula feeding her baby and interested in Depo-Provera birth control which will be administered before discharge.  Return precautions were discussed which patient demonstrated understanding of.  It was also recommended that she have follow-up with OB and establish with primary care which she demonstrated understanding of as well.  She will be discharged in fair condition with iron tablets to go home with.    Physical Exam:  Temp:  [36.3 °C (97.3 °F)-36.9 °C (98.5 °F)] 36.4 °C (97.5 °F)  Pulse:  [65-78] 66  Resp:  [16-18] 16  BP: ()/(58-69) 105/68  SpO2:  [95 %-98 %] 95 %  Physical Exam  General: well  Chest/Breasts: RRR  Abdomen: normal bowel sounds, soft  Fundus: firm and below umbilicus  Incision: not applicable, (vaginal delivery)  Perineum: deferred  Extremities: symmetric, calves nontender    Current Facility-Administered Medications   Medication Dose    medroxyPROGESTERone (Depo-Provera) injection 150 mg  150 mg    ferrous sulfate tablet 325 mg  325 mg    lactated ringers infusion      docusate sodium (Colace) capsule 100 mg  100 mg    ibuprofen (Motrin) tablet 800 mg  800 mg    acetaminophen (Tylenol) tablet 1,000 mg  1,000 mg    PRN oxytocin (PITOCIN) (20 Units/1000 mL) PRN for excessive uterine bleeding - See Admin Instr  125-999 mL/hr    miSOPROStol (Cytotec) tablet 600 mcg  600 mcg    methylergonovine (Methergine) injection 0.2 mg  0.2 mg    carboPROST (Hemabate) injection 250 mcg  250 mcg    oxytocin (Pitocin) 0.02 Units/mL LR (postpartum)   mL/hr    calcium carbonate (Tums) chewable  tab 1,000 mg  1,000 mg    LR infusion      oxytocin (Pitocin) infusion (for post delivery)  125 mL/hr    oxytocin (Pitocin) infusion (for induction)  0.5-20 yudith-units/min    miSOPROStol (Cytotec) tablet 800 mcg  800 mcg       Recent Labs     04/10/24  1124 04/10/24  1444 24  0734   WBC 24.4* 21.2* 16.9*   RBC 3.72* 3.05* 3.25*   HEMOGLOBIN 7.8* 6.6* 7.4*   HEMATOCRIT 26.5* 20.8* 23.7*   MCV 71.2* 68.2* 72.9*   MCH 21.0* 21.6* 22.8*   MCHC 29.4* 31.7* 31.2*   RDW 55.8* 50.9* 60.7*   PLATELETCT 292 198 160*   MPV 9.6 9.5 9.2         Activity/ Discharge Instructions::   Discharge to home  Pelvic Rest x 6 weeks  No heavy lifting x4 weeks  Call or come to ED for: heavy vaginal bleeding, fever >100.4, severe abdominal pain, severe headache, chest pain, shortness of breath,  N/V, incisional drainage, or other concerns.       Follow up:  [facility] in 5 weeks for vaginal delivery; 1 week for incision check for  delivery.     Discharge Meds:   Current Outpatient Medications   Medication Sig Dispense Refill    acetaminophen (TYLENOL) 500 MG Tab Take 2 Tablets by mouth every 6 hours as needed for Mild Pain or Moderate Pain. 30 Tablet 0    ferrous sulfate 325 (65 Fe) MG tablet Take 1 Tablet by mouth every morning with breakfast. 90 Tablet 3    ibuprofen (MOTRIN) 600 MG Tab Take 1 Tablet by mouth every 8 hours as needed for Moderate Pain, Mild Pain or Inflammation. 30 Tablet 0       Bryson Willingham M.D.  PGY-1  [facility]

## 2024-04-12 NOTE — DISCHARGE INSTRUCTIONS

## 2024-04-12 NOTE — PROGRESS NOTES
Obstetrics & Gynecology Post-Delivery Progress Note    Date of Service      29 y.o.  s/p vaginal, spontaneous - did require D&C after for retained products of conception & postpartum hemorrhage     Delivery date: 4/10/24      Subjective  Pt reports feeling well. Denies pain. Is ambulating without issue. Reports light vaginal bleeding. She is voiding and having spontaneous bowel movements. Formula feeding her infant. Interested in discharge today and depot shot.     Pain: Well controlled  Bleeding: lochia minimal  Tolerating PO: yes  Voiding: without difficulty  Ambulating: yes  Passing flatus: Yes  Feeding: bottlefeeding      Objective  24hr VS:  Temp:  [36.3 °C (97.3 °F)-36.9 °C (98.5 °F)] 36.4 °C (97.5 °F)  Pulse:  [65-78] 66  Resp:  [16-18] 16  BP: ()/(58-69) 105/68  SpO2:  [95 %-98 %] 95 %    Physical Exam  Gen: well appearing, no apparent distress, resting comfortably in bed  CV: rrr, no m/r/g  Resp: CTAB, symmetric breath sounds  Abd: soft, nontender, nondistended  Fundus: firm and below umbilicus  Incision: not applicable, (vaginal delivery)  Ext: symmetric, no peripheral edema, calves nontender    Labs:   Latest Reference Range & Units 04/10/24 08:05 04/10/24 11:24 04/10/24 14:44 24 07:34   WBC 4.8 - 10.8 K/uL 19.3 (H) 24.4 (H) 21.2 (H) 16.9 (H)   RBC 4.20 - 5.40 M/uL 3.97 (L) 3.72 (L) 3.05 (L) 3.25 (L)   Hemoglobin 12.0 - 16.0 g/dL 7.5 (L) 7.8 (L) 6.6 (L) 7.4 (L)   Hematocrit 37.0 - 47.0 % 27.1 (L) 26.5 (L) 20.8 (L) 23.7 (L)   MCV 81.4 - 97.8 fL 68.3 (L) 71.2 (L) 68.2 (L) 72.9 (L)   MCH 27.0 - 33.0 pg 18.9 (L) 21.0 (L) 21.6 (L) 22.8 (L)   MCHC 32.2 - 35.5 g/dL 27.7 (L) 29.4 (L) 31.7 (L) 31.2 (L)   RDW 35.9 - 50.0 fL 50.7 (H) 55.8 (H) 50.9 (H) 60.7 (H)   Platelet Count 164 - 446 K/uL 293 292 198 160 (L)   (H): Data is abnormally high  (L): Data is abnormally low    Medications  calcium carbonate, 1,000 mg, Oral, DAILY      PRN medications: LR, docusate sodium, ibuprofen, acetaminophen,  oxytocin, misoprostol, methylergonovine, carboPROST, miSOPROStol      Assessment/Plan  Shae Peguero is a 29 y.o.yo  postpartum day #2  s/p vaginal, spontaneous complicated by post partum hemorrhage that required D&C following delivery. ~2L of blood loss. She received 2u pRBC and 1u FFP this admission.     - Post care: meeting all goals  - Ambulating without difficulty. Requesting discharge today.   - Pain: controlled  - Rh+, Rubella Immune  - Method of Feeding: plans to bottle feed  - Method of Contraception: DepoProvera  VTE prophylaxis: none indicated    - Disposition: likely home PPD 2-3     Bryson Willingham M.D.  PGY-1, [facility]

## 2024-04-12 NOTE — DISCHARGE PLANNING
Meds-to-Beds: Discharge prescription orders listed below delivered to patient's bedside. DMITRY Cardona notified. Patient counseled. Patient elected to have co-payment billed to patient account.     Current Outpatient Medications   Medication Sig Dispense Refill    acetaminophen (TYLENOL) 500 MG Tab Take 2 Tablets by mouth every 6 hours as needed for Mild Pain or Moderate Pain. 30 Tablet 0    ferrous sulfate 325 (65 Fe) MG tablet Take 1 Tablet by mouth every morning with breakfast. 90 Tablet 3    ibuprofen (MOTRIN) 600 MG Tab Take 1 Tablet by mouth every 8 hours as needed for Moderate Pain, Mild Pain or Inflammation. 30 Tablet 0      Ary Richter, PharmD

## 2024-04-12 NOTE — PROGRESS NOTES
0700- Received report from DMITRY Joyce. Assumed care. 12 hour chart check, MAR and orders reviewed.      0820- Assessment complete. Fundus firm and palpable, lochia scant to light rubra. Pain management and interventions discussed with pt. SO at bedside. POC discussed, patient desires discharge today. Bottle feeding infant with formula, states infant tolerating feeds well at this time. All questions and concerns discussed. No further concerns.    1145- Discharge paperwork for infant and mom discussed at bedside. All questions answered. Follow-up appointments reviewed. MOB aware of NBS #2 and dates to complete it by. Paperwork signed and dated at this time.    1225- Patient and infant discharged with escort off unit. Infant discharged in car seat, patient in wheelchair. Infant placed in car seat by parents and checked by RN. Cuddles removed. Bands verified. All questions answered at this time.

## 2025-02-07 ENCOUNTER — HOSPITAL ENCOUNTER (INPATIENT)
Facility: MEDICAL CENTER | Age: 30
LOS: 2 days | End: 2025-02-09
Attending: OBSTETRICS & GYNECOLOGY | Admitting: OBSTETRICS & GYNECOLOGY
Payer: MEDICAID

## 2025-02-07 LAB
ABO GROUP BLD: NORMAL
ANISOCYTOSIS BLD QL SMEAR: ABNORMAL
BARCODED ABORH UBTYP: 5100
BARCODED ABORH UBTYP: 5100
BARCODED PRD CODE UBPRD: NORMAL
BARCODED PRD CODE UBPRD: NORMAL
BARCODED UNIT NUM UBUNT: NORMAL
BARCODED UNIT NUM UBUNT: NORMAL
BASOPHILS # BLD AUTO: 0.2 % (ref 0–1.8)
BASOPHILS # BLD AUTO: 0.4 % (ref 0–1.8)
BASOPHILS # BLD: 0.04 K/UL (ref 0–0.12)
BASOPHILS # BLD: 0.06 K/UL (ref 0–0.12)
BLD GP AB SCN SERPL QL: NORMAL
COMPONENT R 8504R: NORMAL
COMPONENT R 8504R: NORMAL
EOSINOPHIL # BLD AUTO: 0 K/UL (ref 0–0.51)
EOSINOPHIL # BLD AUTO: 0.03 K/UL (ref 0–0.51)
EOSINOPHIL NFR BLD: 0 % (ref 0–6.9)
EOSINOPHIL NFR BLD: 0.2 % (ref 0–6.9)
ERYTHROCYTE [DISTWIDTH] IN BLOOD BY AUTOMATED COUNT: 47.2 FL (ref 35.9–50)
ERYTHROCYTE [DISTWIDTH] IN BLOOD BY AUTOMATED COUNT: 47.9 FL (ref 35.9–50)
ERYTHROCYTE [DISTWIDTH] IN BLOOD BY AUTOMATED COUNT: 61.4 FL (ref 35.9–50)
HBV SURFACE AG SER QL: ABNORMAL
HCT VFR BLD AUTO: 23.5 % (ref 37–47)
HCT VFR BLD AUTO: 27.4 % (ref 37–47)
HCT VFR BLD AUTO: 30.1 % (ref 37–47)
HCV AB SER QL: ABNORMAL
HGB BLD-MCNC: 6.6 G/DL (ref 12–16)
HGB BLD-MCNC: 7.8 G/DL (ref 12–16)
HGB BLD-MCNC: 9.3 G/DL (ref 12–16)
HIV 1+2 AB+HIV1 P24 AG SERPL QL IA: NORMAL
HOLDING TUBE BB 8507: NORMAL
HYPOCHROMIA BLD QL SMEAR: ABNORMAL
IMM GRANULOCYTES # BLD AUTO: 0.12 K/UL (ref 0–0.11)
IMM GRANULOCYTES # BLD AUTO: 0.14 K/UL (ref 0–0.11)
IMM GRANULOCYTES NFR BLD AUTO: 0.7 % (ref 0–0.9)
IMM GRANULOCYTES NFR BLD AUTO: 0.9 % (ref 0–0.9)
LYMPHOCYTES # BLD AUTO: 1.68 K/UL (ref 1–4.8)
LYMPHOCYTES # BLD AUTO: 2.48 K/UL (ref 1–4.8)
LYMPHOCYTES NFR BLD: 16.1 % (ref 22–41)
LYMPHOCYTES NFR BLD: 9.9 % (ref 22–41)
MCH RBC QN AUTO: 18.3 PG (ref 27–33)
MCH RBC QN AUTO: 18.6 PG (ref 27–33)
MCH RBC QN AUTO: 21.5 PG (ref 27–33)
MCHC RBC AUTO-ENTMCNC: 28.1 G/DL (ref 32.2–35.5)
MCHC RBC AUTO-ENTMCNC: 28.5 G/DL (ref 32.2–35.5)
MCHC RBC AUTO-ENTMCNC: 30.9 G/DL (ref 32.2–35.5)
MCV RBC AUTO: 64.2 FL (ref 81.4–97.8)
MCV RBC AUTO: 66.2 FL (ref 81.4–97.8)
MCV RBC AUTO: 69.7 FL (ref 81.4–97.8)
MICROCYTES BLD QL SMEAR: ABNORMAL
MONOCYTES # BLD AUTO: 0.58 K/UL (ref 0–0.85)
MONOCYTES # BLD AUTO: 0.68 K/UL (ref 0–0.85)
MONOCYTES NFR BLD AUTO: 3.4 % (ref 0–13.4)
MONOCYTES NFR BLD AUTO: 4.4 % (ref 0–13.4)
MORPHOLOGY BLD-IMP: NORMAL
NEUTROPHILS # BLD AUTO: 11.98 K/UL (ref 1.82–7.42)
NEUTROPHILS # BLD AUTO: 14.57 K/UL (ref 1.82–7.42)
NEUTROPHILS NFR BLD: 78 % (ref 44–72)
NEUTROPHILS NFR BLD: 85.8 % (ref 44–72)
NRBC # BLD AUTO: 0 K/UL
NRBC # BLD AUTO: 0 K/UL
NRBC BLD-RTO: 0 /100 WBC (ref 0–0.2)
NRBC BLD-RTO: 0 /100 WBC (ref 0–0.2)
PLATELET # BLD AUTO: 228 K/UL (ref 164–446)
PLATELET # BLD AUTO: 239 K/UL (ref 164–446)
PLATELET # BLD AUTO: 270 K/UL (ref 164–446)
PLATELET BLD QL SMEAR: NORMAL
PMV BLD AUTO: 9.2 FL (ref 9–12.9)
PMV BLD AUTO: 9.3 FL (ref 9–12.9)
PMV BLD AUTO: 9.4 FL (ref 9–12.9)
POIKILOCYTOSIS BLD QL SMEAR: NORMAL
PRODUCT TYPE UPROD: NORMAL
PRODUCT TYPE UPROD: NORMAL
RBC # BLD AUTO: 3.55 M/UL (ref 4.2–5.4)
RBC # BLD AUTO: 4.27 M/UL (ref 4.2–5.4)
RBC # BLD AUTO: 4.32 M/UL (ref 4.2–5.4)
RBC BLD AUTO: PRESENT
RH BLD: NORMAL
RUBV AB SER QL: 23.8 IU/ML
T PALLIDUM AB SER QL IA: ABNORMAL
T PALLIDUM AB SER QL IA: NORMAL
TARGETS BLD QL SMEAR: NORMAL
UNIT STATUS USTAT: NORMAL
UNIT STATUS USTAT: NORMAL
WBC # BLD AUTO: 15.4 K/UL (ref 4.8–10.8)
WBC # BLD AUTO: 16.2 K/UL (ref 4.8–10.8)
WBC # BLD AUTO: 17 K/UL (ref 4.8–10.8)

## 2025-02-07 PROCEDURE — 700105 HCHG RX REV CODE 258: Performed by: OBSTETRICS & GYNECOLOGY

## 2025-02-07 PROCEDURE — 99999 PR NO CHARGE: CPT

## 2025-02-07 PROCEDURE — P9016 RBC LEUKOCYTES REDUCED: HCPCS | Mod: 91

## 2025-02-07 PROCEDURE — 36415 COLL VENOUS BLD VENIPUNCTURE: CPT

## 2025-02-07 PROCEDURE — 0UDB7ZZ EXTRACTION OF ENDOMETRIUM, VIA NATURAL OR ARTIFICIAL OPENING: ICD-10-PCS | Performed by: OBSTETRICS & GYNECOLOGY

## 2025-02-07 PROCEDURE — 86901 BLOOD TYPING SEROLOGIC RH(D): CPT

## 2025-02-07 PROCEDURE — A9270 NON-COVERED ITEM OR SERVICE: HCPCS

## 2025-02-07 PROCEDURE — 87340 HEPATITIS B SURFACE AG IA: CPT

## 2025-02-07 PROCEDURE — 700111 HCHG RX REV CODE 636 W/ 250 OVERRIDE (IP): Mod: JZ

## 2025-02-07 PROCEDURE — 85027 COMPLETE CBC AUTOMATED: CPT

## 2025-02-07 PROCEDURE — 700111 HCHG RX REV CODE 636 W/ 250 OVERRIDE (IP): Mod: JZ | Performed by: OBSTETRICS & GYNECOLOGY

## 2025-02-07 PROCEDURE — 86850 RBC ANTIBODY SCREEN: CPT

## 2025-02-07 PROCEDURE — 770002 HCHG ROOM/CARE - OB PRIVATE (112)

## 2025-02-07 PROCEDURE — 59409 OBSTETRICAL CARE: CPT

## 2025-02-07 PROCEDURE — 86592 SYPHILIS TEST NON-TREP QUAL: CPT

## 2025-02-07 PROCEDURE — 304965 HCHG RECOVERY SERVICES

## 2025-02-07 PROCEDURE — 700101 HCHG RX REV CODE 250: Performed by: OBSTETRICS & GYNECOLOGY

## 2025-02-07 PROCEDURE — 700105 HCHG RX REV CODE 258

## 2025-02-07 PROCEDURE — 85025 COMPLETE CBC W/AUTO DIFF WBC: CPT

## 2025-02-07 PROCEDURE — 86780 TREPONEMA PALLIDUM: CPT

## 2025-02-07 PROCEDURE — 700102 HCHG RX REV CODE 250 W/ 637 OVERRIDE(OP)

## 2025-02-07 PROCEDURE — 86762 RUBELLA ANTIBODY: CPT

## 2025-02-07 PROCEDURE — 87389 HIV-1 AG W/HIV-1&-2 AB AG IA: CPT

## 2025-02-07 PROCEDURE — 86900 BLOOD TYPING SEROLOGIC ABO: CPT

## 2025-02-07 PROCEDURE — 86803 HEPATITIS C AB TEST: CPT

## 2025-02-07 PROCEDURE — 36430 TRANSFUSION BLD/BLD COMPNT: CPT

## 2025-02-07 PROCEDURE — 86923 COMPATIBILITY TEST ELECTRIC: CPT | Mod: 91

## 2025-02-07 PROCEDURE — 30233N1 TRANSFUSION OF NONAUTOLOGOUS RED BLOOD CELLS INTO PERIPHERAL VEIN, PERCUTANEOUS APPROACH: ICD-10-PCS | Performed by: OBSTETRICS & GYNECOLOGY

## 2025-02-07 RX ORDER — OXYTOCIN 10 [USP'U]/ML
10 INJECTION, SOLUTION INTRAMUSCULAR; INTRAVENOUS
Status: DISCONTINUED | OUTPATIENT
Start: 2025-02-07 | End: 2025-02-07 | Stop reason: HOSPADM

## 2025-02-07 RX ORDER — OXYTOCIN 10 [USP'U]/ML
INJECTION, SOLUTION INTRAMUSCULAR; INTRAVENOUS
Status: COMPLETED
Start: 2025-02-07 | End: 2025-02-07

## 2025-02-07 RX ORDER — OXYTOCIN 10 [USP'U]/ML
INJECTION, SOLUTION INTRAMUSCULAR; INTRAVENOUS
Status: ACTIVE
Start: 2025-02-07 | End: 2025-02-07

## 2025-02-07 RX ORDER — IBUPROFEN 800 MG/1
800 TABLET, FILM COATED ORAL EVERY 8 HOURS PRN
Status: DISCONTINUED | OUTPATIENT
Start: 2025-02-07 | End: 2025-02-10 | Stop reason: HOSPADM

## 2025-02-07 RX ORDER — ACETAMINOPHEN 500 MG
1000 TABLET ORAL
Status: DISCONTINUED | OUTPATIENT
Start: 2025-02-07 | End: 2025-02-07 | Stop reason: HOSPADM

## 2025-02-07 RX ORDER — IBUPROFEN 800 MG/1
800 TABLET, FILM COATED ORAL
Status: DISCONTINUED | OUTPATIENT
Start: 2025-02-07 | End: 2025-02-07 | Stop reason: HOSPADM

## 2025-02-07 RX ORDER — SODIUM CHLORIDE, SODIUM LACTATE, POTASSIUM CHLORIDE, CALCIUM CHLORIDE 600; 310; 30; 20 MG/100ML; MG/100ML; MG/100ML; MG/100ML
INJECTION, SOLUTION INTRAVENOUS CONTINUOUS
Status: DISCONTINUED | OUTPATIENT
Start: 2025-02-07 | End: 2025-02-09

## 2025-02-07 RX ORDER — ACETAMINOPHEN 500 MG
1000 TABLET ORAL EVERY 6 HOURS PRN
Status: DISCONTINUED | OUTPATIENT
Start: 2025-02-07 | End: 2025-02-10 | Stop reason: HOSPADM

## 2025-02-07 RX ORDER — SODIUM CHLORIDE, SODIUM LACTATE, POTASSIUM CHLORIDE, CALCIUM CHLORIDE 600; 310; 30; 20 MG/100ML; MG/100ML; MG/100ML; MG/100ML
2000 INJECTION, SOLUTION INTRAVENOUS PRN
Status: DISCONTINUED | OUTPATIENT
Start: 2025-02-07 | End: 2025-02-10 | Stop reason: HOSPADM

## 2025-02-07 RX ORDER — DOCUSATE SODIUM 100 MG/1
100 CAPSULE, LIQUID FILLED ORAL 2 TIMES DAILY PRN
Status: DISCONTINUED | OUTPATIENT
Start: 2025-02-07 | End: 2025-02-10 | Stop reason: HOSPADM

## 2025-02-07 RX ORDER — TERBUTALINE SULFATE 1 MG/ML
0.25 INJECTION SUBCUTANEOUS
Status: DISCONTINUED | OUTPATIENT
Start: 2025-02-07 | End: 2025-02-07 | Stop reason: HOSPADM

## 2025-02-07 RX ORDER — LIDOCAINE HYDROCHLORIDE 10 MG/ML
20 INJECTION, SOLUTION INFILTRATION; PERINEURAL
Status: DISCONTINUED | OUTPATIENT
Start: 2025-02-07 | End: 2025-02-07 | Stop reason: HOSPADM

## 2025-02-07 RX ADMIN — TRANEXAMIC ACID 1000 MG: 100 INJECTION, SOLUTION INTRAVENOUS at 02:54

## 2025-02-07 RX ADMIN — ACETAMINOPHEN 1000 MG: 500 TABLET ORAL at 18:32

## 2025-02-07 RX ADMIN — OXYTOCIN 10 UNITS: 10 INJECTION, SOLUTION INTRAMUSCULAR; INTRAVENOUS at 03:30

## 2025-02-07 RX ADMIN — OXYTOCIN 10 UNITS: 10 INJECTION, SOLUTION INTRAMUSCULAR; INTRAVENOUS at 02:35

## 2025-02-07 RX ADMIN — OXYTOCIN 125 ML/HR: 10 INJECTION, SOLUTION INTRAMUSCULAR; INTRAVENOUS at 04:27

## 2025-02-07 ASSESSMENT — PAIN DESCRIPTION - PAIN TYPE
TYPE: ACUTE PAIN
TYPE: ACUTE PAIN

## 2025-02-07 NOTE — PROGRESS NOTES
0219: Pt is an OB check to room 218. Pt states she has not had a single prenatal visit. She is not sure when her last period was. States her contractions started at 0100, +FM, -LOF, + vaginal bleeding.     0224: Pt laying back in bed and bright red blood continuously streaming out of her vagina. TERRY Iraheta called to bedside to assess.     0226: Pt spontaneously pushing. BBOW rupture. Fetal breech presentation noted, and fetal descent from the vaginal opening. Help called. MD Gary to bedside.    0227: Delivery of female fetus.    0229: MD Aldo to bedside.    0231: MD Deb to bedside.

## 2025-02-07 NOTE — H&P
Admission History and Physical      Shae Peguero is a 30 y.o. female  at Unknown who presents for strong contractions. She also had significant vaginal bleeding. She has not had any prenatal care this pregnancy. Hx of four previous vaginal deliveries, her last with significant hemorrhage requiring brigitte placement.     She reported to the hospital yas and was moved straight to a labor and delivery room by RN d/t her multiparous status and extreme discomfort. Significant bleeding was noted, so RN called myself to bedside for evaluation. Pt started pushing spontaneously, bulging bag of water noted at perineum and AROM occurred. Fetal feet were noted as the presenting part, and MD was notified immediately. See separate delivery note.     Subjective:   reports contractions  reports pain  denies leaking of fluid  reports vaginal bleeding.   reports fetal movement      ROS: Pertinent items are noted in HPI.    Past Medical History:   Diagnosis Date    Urinary tract infection      Past Surgical History:   Procedure Laterality Date    DILATION AND CURETTAGE N/A 4/10/2024    Procedure: DILATION AND CURETTAGE;  Surgeon: Sharona Nice D.O.;  Location: SURGERY LABOR AND DELIVERY;  Service: Obstetrics     OB History    Para Term  AB Living   5 5 3     4   SAB IAB Ectopic Molar Multiple Live Births           0 4      # Outcome Date GA Lbr Sudeep/2nd Weight Sex Type Anes PTL Lv   5 Para 25   2.74 kg (6 lb 0.7 oz) F Vag-Breech None  HUGO   4 Term 04/10/24 38w0d / 00:14 3.285 kg (7 lb 3.9 oz) M Vag-Spont None N HUGO   3 Term 23 38w4d 02:28 / 00:05 3.025 kg (6 lb 10.7 oz) F Vag-Spont None N HUGO   2 Para 21 19w5d  0 kg () M Vag-Spont None Y FD      Birth Comments: 0.15 g    1 Term 20 39w2d / 00:10 3.265 kg (7 lb 3.2 oz) F Vag-Vacuum None N HUGO     Social History     Socioeconomic History    Marital status: Single     Spouse name: Not on file    Number of children: Not on file  "   Years of education: Not on file    Highest education level: Not on file   Occupational History    Not on file   Tobacco Use    Smoking status: Never    Smokeless tobacco: Never   Vaping Use    Vaping status: Never Used   Substance and Sexual Activity    Alcohol use: Never    Drug use: Never    Sexual activity: Yes     Partners: Male     Comment: none   Other Topics Concern    Not on file   Social History Narrative    Not on file     Social Drivers of Health     Financial Resource Strain: Not on file   Food Insecurity: Not on file   Transportation Needs: Not on file   Physical Activity: Not on file   Stress: Not on file   Social Connections: Not on file   Intimate Partner Violence: Not on file   Housing Stability: Not on file     Allergies: Patient has no known allergies.    Current Facility-Administered Medications:     OXYTOCIN 10 UNIT/ML INJ SOLN, , , ,     OXYTOCIN 10 UNIT/ML INJ SOLN, , , ,     LR infusion, , Intravenous, Continuous, Cirilo Boyd M.D.    lidocaine (XYLOCAINE) 1%  injection, 20 mL, Subcutaneous, Once PRN, Cirilo Boyd M.D.    terbutaline (Brethine) injection 0.25 mg, 0.25 mg, Subcutaneous, Once PRN, Cirilo Boyd M.D.    oxytocin (Pitocin) infusion bolus (for post delivery), 10 Units, Intravenous, Once **FOLLOWED BY** oxytocin (Pitocin) infusion bolus (for post delivery), 10 Units, Intravenous, Once **FOLLOWED BY** oxytocin (Pitocin) infusion (for post delivery), 125 mL/hr, Intravenous, Continuous, Cirilo Boyd M.D.    oxytocin (Pitocin) injection 10 Units, 10 Units, Intramuscular, Once PRN, Cirilo Boyd M.D.    ibuprofen (Motrin) tablet 800 mg, 800 mg, Oral, Once PRN, Cirilo Boyd M.D.    acetaminophen (Tylenol) tablet 1,000 mg, 1,000 mg, Oral, Once PRN, Cirilo Boyd M.D.    oxytocin (Pitocin) 0.02 Units/mL infusion, , , ,     No PNC this pregnancy, no US        Objective:      Ht 1.676 m (5' 6\")   Wt 72.6 kg (160 lb)     General:   alert   Skin:   normal   HEENT:  PERANA MARÍA "   Lungs:   CTA bilateral   Heart:   No assessed   Abdomen:   gravid, NT   EFW:  unknown   Pelvis:  Not assessed   FHT:  Not assessed   Uterine Size: S=D   Presentations: Breech (noted at delivery)   Cervix: C/P                              Lab Review  Lab:   Blood type: O     Recent Results (from the past 35 weeks)   PRENATAL PANEL 3+HIV+HCV    Collection Time: 02/07/25  2:30 AM   Result Value Ref Range    Rubella IgG Antibody 23.80 IU/mL    Hepatitis B Surface Antigen Non-Reactive Non-Reactive    Hepatitis C Antibody Non-Reactive Non-Reactive    Syphilis, Treponemal Qual Non-Reactive Non-Reactive   HIV AG/AB COMBO ASSAY SCREENING    Collection Time: 02/07/25  2:30 AM   Result Value Ref Range    HIV Ag/Ab Combo Assay Non-Reactive Non Reactive   OP Prenatal Panel-Blood Bank    Collection Time: 02/07/25  2:30 AM   Result Value Ref Range    ABO Grouping Only O     Rh Grouping Only POS     Antibody Screen Scrn NEG           Assessment:   Shae Peguero at Unknown  Labor status: active labor  Obstetrical history significant for   Patient Active Problem List    Diagnosis Date Noted    Labor and delivery, indication for care 02/07/2025   .      Plan:     #Active labor    #No prenatal care    #GBS unknown    #Hx PPH    #Breech presentation    #Precipitous delivery          TERRY Oro MD Attending

## 2025-02-07 NOTE — DISCHARGE PLANNING
Discharge Planning Assessment Post Partum    Reason for Referral: No prenatal care, possible adopt out.  Address: [address] [place] [address] [place] [address]  Type of Living Situation: Stable housing  Mom Diagnosis: Pregnancy, vaginal delivery  Baby Diagnosis: , unknown gestation  Primary Language: English    Name of Baby: Rosa Maria Chino  Father of the Baby: Matias Peguero  1997  Involved in baby’s care? Yes however, MOB confirmed that baby will be adopted by aunt.  Contact Information: Declined    Prenatal Care: MOB states none d/t lack of insurance.  Mom's PCP: None listed  PCP for new baby: Pediatrician list provided    Support System: FOB, aunt  Coping/Bonding between mother & baby: Yes  Source of Feeding: Formula  Supplies for Infant: Prepared for infant, denies any needs.    Mom's Insurance: States none, PFA request completed and will screen for Medicaid.  Baby Covered on Insurance: PFA will screen baby for Medicaid.  Mother Employed/School: [employer]  Other children in the home/names & ages: Mercy 5yo, Sheri 3yo, Yashi 10mo    Financial Hardship/Income: No   Mom's Mental status: Alert and oriented  Services used prior to admit: SNAP    CPS History: No  Psychiatric History: No  Domestic Violence History: No  Drug/ETOH History: No    Resources Provided:  PPD resource list, WIC information, list of outpatient mental health/counseling resources, pediatrician list, community resource list, and diaper bank assistance resources.   Referrals Made: None     Clearance for Discharge: MOB does not have plan in place for private adoption or adoption  at this time. MOB can follow up with process once discharges. MOB is agreeable for baby to DC home with her and will arrange care with aunt. As long as family is in agreement, infant is cleared to discharge home with MOB once medically cleared.

## 2025-02-07 NOTE — L&D DELIVERY NOTE
Admit Date:   2025    Pregnancy Complications: no PNC, ha PPH, grandmutip, hx  demise  Medical Induction of Labor: no  GBS: unk  Anesthesia: no  Gestational Age at delivery: unk    Shae Peguero is a 30 y.o.  at Unknown gestation who presented with active labor. She was moved directly to L&D room by RN. I was called to bedside prior to vaginal exam d/t significant vaginal bleeding. Pt started pushing spontaneously and bulging bag of water noted at perineum. SROM occurred and fetal foot noted protruding from vagina. Charge nurse was at bedside, notified of breech presentation and instructed to call for MD backup. Multiple nurses, respiratory therapist, NICU called for delivery. Maternal pushing efforts resulted in delivery of fetus up to abdomen. MD Gary, at bedside and delivered fetal arms, then fetal head. MD Aldo arrived at bedside. Apgars 8,9.     Pitocin given IM and RN started IV. Large gush of blood noted. TXA and cytotec given  prophylactic d/t hx of PPH and risks of PPH d/t multiparity and breech presentation. Fundus firm with minimal massage. Pitocin then given IV.     Vulva and vagina were inspected and noted to be intact. .    Patient evaluated by Benedicto MD.         TERRY Oro MD Attending

## 2025-02-07 NOTE — PROGRESS NOTES
0255 Report from Rand ANDRES. POC discussed. Pt denies needs at this time. Pt encouraged to use call light for future needs.   0640 New lab results reviewed with Myrtle STEWART. Orders received.   7205 Report to Rashida ANDRES. Care Relinquished.

## 2025-02-08 ENCOUNTER — PHARMACY VISIT (OUTPATIENT)
Dept: PHARMACY | Facility: MEDICAL CENTER | Age: 30
End: 2025-02-08
Payer: MEDICARE

## 2025-02-08 PROCEDURE — 770002 HCHG ROOM/CARE - OB PRIVATE (112)

## 2025-02-08 PROCEDURE — 700102 HCHG RX REV CODE 250 W/ 637 OVERRIDE(OP)

## 2025-02-08 PROCEDURE — RXMED WILLOW AMBULATORY MEDICATION CHARGE

## 2025-02-08 PROCEDURE — A9270 NON-COVERED ITEM OR SERVICE: HCPCS

## 2025-02-08 PROCEDURE — 700111 HCHG RX REV CODE 636 W/ 250 OVERRIDE (IP): Mod: JZ

## 2025-02-08 RX ORDER — IBUPROFEN 800 MG/1
800 TABLET, FILM COATED ORAL EVERY 8 HOURS PRN
Qty: 120 TABLET | Refills: 2 | Status: ON HOLD | OUTPATIENT
Start: 2025-02-08 | End: 2025-02-26

## 2025-02-08 RX ORDER — MEDROXYPROGESTERONE ACETATE 150 MG/ML
150 INJECTION, SUSPENSION INTRAMUSCULAR ONCE
Status: COMPLETED | OUTPATIENT
Start: 2025-02-08 | End: 2025-02-08

## 2025-02-08 RX ORDER — ACETAMINOPHEN 500 MG
1000 TABLET ORAL EVERY 6 HOURS PRN
Qty: 120 TABLET | Refills: 2 | Status: SHIPPED | OUTPATIENT
Start: 2025-02-08

## 2025-02-08 RX ORDER — PSEUDOEPHEDRINE HCL 30 MG
100 TABLET ORAL 2 TIMES DAILY PRN
Qty: 60 CAPSULE | Refills: 0 | Status: SHIPPED | OUTPATIENT
Start: 2025-02-08 | End: 2025-02-24

## 2025-02-08 RX ORDER — FERROUS SULFATE 325(65) MG
325 TABLET ORAL
Status: DISCONTINUED | OUTPATIENT
Start: 2025-02-08 | End: 2025-02-10 | Stop reason: HOSPADM

## 2025-02-08 RX ORDER — ASCORBIC ACID 500 MG
500 TABLET ORAL DAILY
Qty: 60 TABLET | Refills: 2 | Status: SHIPPED | OUTPATIENT
Start: 2025-02-08

## 2025-02-08 RX ORDER — FERROUS SULFATE 325(65) MG
325 TABLET ORAL DAILY
Qty: 60 TABLET | Refills: 2 | Status: SHIPPED | OUTPATIENT
Start: 2025-02-08

## 2025-02-08 RX ADMIN — FERROUS SULFATE TAB 325 MG (65 MG ELEMENTAL FE) 325 MG: 325 (65 FE) TAB at 09:09

## 2025-02-08 RX ADMIN — MEDROXYPROGESTERONE ACETATE 150 MG: 150 INJECTION, SUSPENSION, EXTENDED RELEASE INTRAMUSCULAR at 11:45

## 2025-02-08 ASSESSMENT — EDINBURGH POSTNATAL DEPRESSION SCALE (EPDS)
THE THOUGHT OF HARMING MYSELF HAS OCCURRED TO ME: NEVER
I HAVE FELT SCARED OR PANICKY FOR NO GOOD REASON: NO, NOT AT ALL
I HAVE BLAMED MYSELF UNNECESSARILY WHEN THINGS WENT WRONG: NOT VERY OFTEN
I HAVE BEEN ANXIOUS OR WORRIED FOR NO GOOD REASON: NO, NOT AT ALL
I HAVE LOOKED FORWARD WITH ENJOYMENT TO THINGS: AS MUCH AS I EVER DID
I HAVE BEEN SO UNHAPPY THAT I HAVE BEEN CRYING: NO, NEVER
I HAVE BEEN ABLE TO LAUGH AND SEE THE FUNNY SIDE OF THINGS: AS MUCH AS I ALWAYS COULD
I HAVE BEEN SO UNHAPPY THAT I HAVE HAD DIFFICULTY SLEEPING: NOT AT ALL
I HAVE FELT SAD OR MISERABLE: NO, NOT AT ALL
THINGS HAVE BEEN GETTING ON TOP OF ME: NO, I HAVE BEEN COPING AS WELL AS EVER

## 2025-02-08 NOTE — PROGRESS NOTES
Bedside report received from Maria Del Carmen WEINER RN. Patient care assumed. Chart, prenatal labs, and orders reviewed  Patient assessment complete and WDL. Fundus firm and lochia light/scant. Patient ambulating to bathroom and voiding without difficulty. Patient denies any dizziness/lightheadedness or calf pain/tenderness. Patient also denies any chest pain, difficulty breathing or SOB. Plan of care discussed with patient for the day including providing cares to infant if planning on taking infant home for adoption to family member, pain management, and ambulation in halls. Pain medication plan discussed with patient; patient states she will call if PRN pain medication is wanted. All questions/concerns addressed at this time. Call light within reach, encouraged to call with needs. Will continue with routine postpartum cares.

## 2025-02-08 NOTE — PROGRESS NOTES
0930  Patient transferred to postpartum unit at 0921. Received report from DMITRY Field. Orieneted patient to room and discussed POC for MOB and infant. Patient was assessed, fundus firm, lochia light, rubra. Patient educated to call for assistance with ambulation. Call light within reach, bed in lowest position.     See note in infant chart. Encouraged to call if any needs arise.

## 2025-02-08 NOTE — DISCHARGE SUMMARY
[facility]  Obstetrics Discharge Summary    Date of Admission: 2025  Date of Discharge: 25    Admitting diagnosis:    1. Pregnancy at Unknown  2. Limited prenatal care  3. Anemia  4. History of postpartum hemorrhage with previous delivery    Discharge Diagnosis:   1. Status post breech vaginal delivery, spontaneous.  2. Acute blood loss anemia requiring transfusion    Hospital Course:   Pt is 30 y.o. now  who presented on 2025 for active labor with no prenatal care.   During a vaginal exam patient started pushing spontaneously and SROM occurred and a fetal foot was noted to be protruding from the vagina. MD was called for backup and baby was delivered. A large gush of blood was noted after delivery, pt was given TXA and cytotec. No lacerations were noted.     After delivery patient had a drop in hemoglobin from 7.8 to 6.6. She was transfused 2 units of pRBC with an improvement in hemoglobin to 9.3.     Postpartum course was unremarkable and patient has met all postpartum milestones.  Patient had early ambulation, well managed pain, tolerance of diet, spontaneous voiding.  She has remained afebrile and blood pressure has been well controlled.   All maternal questions and concerns addressed.    Single female infant was delivered via breech vaginal delivery on 25 at 0227 with APGARs 8 and 9 at 1 and 5 minutes respectively.    ml    PHYSICAL EXAM:  Temp:  [36.1 °C (97 °F)-37.1 °C (98.7 °F)] 36.1 °C (97 °F)  Pulse:  [57-81] 58  Resp:  [15-18] 16  BP: ()/(56-66) 105/62  SpO2:  [94 %-100 %] 97 %    GEN: well appearing, no apparent distress  CV: +S1S2, RRR, no BLE edema  RESP: CTAB, breathing comfortably on RA  ABD: soft, non-tender, non-distended, +BS  Fundus: firm below level of umbilicus  Incision: not applicable, (vaginal delivery)  Perineum: Deferred  Extremities: symmetric, calves nontender    HISTORY:  Patient Active Problem List   Diagnosis   (none) - all problems  resolved or deleted      Past Medical History:   Diagnosis Date    Urinary tract infection      OB History    Para Term  AB Living   5 5 3     4   SAB IAB Ectopic Molar Multiple Live Births           0 4      # Outcome Date GA Lbr Sudeep/2nd Weight Sex Type Anes PTL Lv   5 Para 25   2.74 kg (6 lb 0.7 oz) F Vag-Breech None  HUGO   4 Term 04/10/24 38w0d / 00:14 3.285 kg (7 lb 3.9 oz) M Vag-Spont None N HUGO   3 Term 23 38w4d 02:28 / 00:05 3.025 kg (6 lb 10.7 oz) F Vag-Spont None N HUGO   2 Para 21 19w5d  0 kg () M Vag-Spont None Y FD      Birth Comments: 0.15 g    1 Term 20 39w2d / 00:10 3.265 kg (7 lb 3.2 oz) F Vag-Vacuum None N HUGO     Past Surgical History:   Procedure Laterality Date    DILATION AND CURETTAGE N/A 4/10/2024    Procedure: DILATION AND CURETTAGE;  Surgeon: Sharona Nice D.O.;  Location: SUR[facility]ERY LAB[facility] DELIVERY;  Service: Obstetrics     No Known Allergies   Current Facility-Administered Medications   Medication Dose    ferrous sulfate tablet 325 mg  325 mg    LR infusion      oxytocin (Pitocin) infusion (for post delivery)  125 mL/hr    lactated ringers infusion  2,000 mL    docusate sodium (Colace) capsule 100 mg  100 mg    ibuprofen (Motrin) tablet 800 mg  800 mg    acetaminophen (Tylenol) tablet 1,000 mg  1,000 mg    tetanus-dipth-acell pertussis (Tdap) inj 0.5 mL  0.5 mL     Recent Labs     25  0230 25  0545 25  1743   WBC 15.4* 17.0* 16.2*   RBC 4.27 3.55* 4.32   HEMOGLOBIN 7.8* 6.6* 9.3*   HEMATOCRIT 27.4* 23.5* 30.1*   MCV 64.2* 66.2* 69.7*   MCH 18.3* 18.6* 21.5*   MCHC 28.5* 28.1* 30.9*   RDW 47.2 47.9 61.4*   PLATELETCT 270 239 228   MPV 9.2 9.4 9.3       Discharge Meds:   Current Outpatient Medications   Medication Sig Dispense Refill    acetaminophen (TYLENOL) 500 MG Tab Take 2 Tablets by mouth every 6 hours as needed for Mild Pain or Moderate Pain. 120 Tablet 2    ibuprofen (MOTRIN) 800 MG Tab Take 1 Tablet by mouth every 8 hours as  needed for Mild Pain or Moderate Pain. 120 Tablet 2    docusate sodium 100 MG Cap Take 100 mg by mouth 2 times a day as needed for Constipation. 60 Capsule 0    ferrous sulfate 325 (65 Fe) MG tablet Take 1 Tablet by mouth every day. 60 Tablet 2    ascorbic acid (VITAMIN C) 500 MG tablet Take 1 Tablet by mouth every day. 60 Tablet 2           Activity/ Discharge Instructions:  Discharge to home  Exercise and Activities as tolerated  Pelvic Rest x 6 weeks  No heavy lifting x4 weeks  Call or come to ED for: heavy vaginal bleeding, fever >100.4, severe abdominal pain, severe headache, chest pain, shortness of breath, significant nausea or vomiting, incisional drainage, or other concerns.  Contraception: Pt received depo prior to discharge, instructed patient to follow up in 3 months for next shot    Adopting Out  Patient plans to adopt baby out to FOB's aunt. No legal actions have been taken at this time, social work has been consulted and is working with both parties.    Diet:  As tolerated. Additional 400 kcal per day to maintain milk supply. Drink plenty of fluids daily.  Continue iron and vitamin C every other day for six months or until anemia improves.     Follow up:    Patient has had no prenatal care, recommend follow up sooner than typical 5 weeks for vaginal delivery. Patient should follow up in 1-2 weeks.      Iwona Schaefer DO  PGY-1 Family Medicine Resident  [facility]

## 2025-02-08 NOTE — PROGRESS NOTES
"Spoke with patient and FOB in length regarding their plan for adopting out infant. Both patient and FOB stated that FOB aunt will be coming to hospital at some point today. When explained to the patient and FOB that aunt will have to participate in infant's cares until infant is ready to be discharged. They then told this RN that they don't believe aunt will be able to get a ride to the hospital, and FOB then stated \"I'll just take the baby home then\". This RN then explained to parents that they will need to start providing cares on infant. Patient and FOB verbalized understanding and both in agreement of plan of care. Informed patient and FOB that we will re-band them and infant with new ID bands (as FOB tore his off to shower). Informed patient and FOB that infant will not be discharged today and verified with patient that she would like to stay one more day until infant is discharged.     Postpartum supervisor Chaya ANDRADE RN, Dr. Schaefer (OB), and Kristie ANDRADE () all informed of new plan.   "

## 2025-02-08 NOTE — DISCHARGE PLANNING
LSW met with RICK and PAL at bedside. Both parents reports FOB Aunt Leilani will be adopting the baby as a private adoption. No  has been selected and no formal paperwork has been filed. Both parents report Aunt Leilani will be at bedside today.   MOB is agreeable for baby to DC home with her and will arrange care with aunt. As long as family is in agreement, infant is cleared to discharge home with MOB once medically cleared.      Addendum: @ noon LSW met with RICK and PAL at bedside. Parents informed this writer their Aunt could not secure transportation to hospital and they will be taking the baby home with them. FOELIZABETH reports his dad will bring a car seat and his family will be helping out as well. RICK reports she was given resources from previous LSW and is familiar with [place]. Both parents report they will decide collectively as a family after discharge on the next steps to take moving forward  with the private adoption.

## 2025-02-08 NOTE — CARE PLAN
Problem: Altered Physiologic Condition  Goal: Patient physiologically stable as evidenced by normal lochia, palpable uterine involution and vitals within normal limits  Outcome: Progressing     Problem: Infection - Postpartum  Goal: Postpartum patient will be free of signs and symptoms of infection  Outcome: Progressing   The patient is Stable - Low risk of patient condition declining or worsening    Shift Goal: pain controlled, rest  Clinical Goals: maintain normal lochia  Patient Goals: pain controlled, rest    Progress made toward(s) clinical / shift goals:  pt verbalized acceptable level of pain    Patient is not progressing towards the following goals:

## 2025-02-08 NOTE — PROGRESS NOTES
2130 Assessment done fundus firm with scant lochia, abdomen soft non distended, up to bathroom voiding without difficulty, Denies pain at this time, Needs attended.

## 2025-02-08 NOTE — PROGRESS NOTES
Patient and FOB taken to NBN. Patient, FOB, and infant re-banded with new ID bands (see transition assessment flow sheet). 2 RN verification completed with this RN and CARL Bright RN. Patient, FOB, and infant taken back to room. Educated parents on feedings every 3 hours (next one at 1630), amount of formula infant has been taken, diaper changes before feeds and as needed. All questions have been addressed at this time. Patient and FOB to call if needing any further assistance or any additional questions.

## 2025-02-08 NOTE — CARE PLAN
The patient is Stable - Low risk of patient condition declining or worsening    Shift Goals  Clinical Goals: fundus firm, lochia WDL    Progress made toward(s) clinical / shift goals:    Problem: Altered Physiologic Condition  Goal: Patient physiologically stable as evidenced by normal lochia, palpable uterine involution and vitals within normal limits  Outcome: Progressing  Note: Fundus firm and midline. Lochia light, rubra. Vitals within defined limits       Patient is not progressing towards the following goals:      Problem: Psychosocial - Postpartum  Goal: Patient will verbalize and demonstrate effective bonding and parenting behavior  Outcome: Not Progressing  Note: No bonding. Possibly adopting out

## 2025-02-09 VITALS
HEART RATE: 83 BPM | TEMPERATURE: 98.1 F | RESPIRATION RATE: 18 BRPM | OXYGEN SATURATION: 99 % | WEIGHT: 160 LBS | DIASTOLIC BLOOD PRESSURE: 69 MMHG | SYSTOLIC BLOOD PRESSURE: 104 MMHG | HEIGHT: 66 IN | BODY MASS INDEX: 25.71 KG/M2

## 2025-02-09 PROCEDURE — A9270 NON-COVERED ITEM OR SERVICE: HCPCS

## 2025-02-09 PROCEDURE — 700102 HCHG RX REV CODE 250 W/ 637 OVERRIDE(OP)

## 2025-02-09 RX ADMIN — IBUPROFEN 800 MG: 800 TABLET, FILM COATED ORAL at 15:25

## 2025-02-09 SDOH — ECONOMIC STABILITY: TRANSPORTATION INSECURITY
IN THE PAST 12 MONTHS, HAS LACK OF RELIABLE TRANSPORTATION KEPT YOU FROM MEDICAL APPOINTMENTS, MEETINGS, WORK OR FROM GETTING THINGS NEEDED FOR DAILY LIVING?: NO

## 2025-02-09 SDOH — ECONOMIC STABILITY: TRANSPORTATION INSECURITY
IN THE PAST 12 MONTHS, HAS THE LACK OF TRANSPORTATION KEPT YOU FROM MEDICAL APPOINTMENTS OR FROM GETTING MEDICATIONS?: NO

## 2025-02-09 ASSESSMENT — SOCIAL DETERMINANTS OF HEALTH (SDOH)
WITHIN THE LAST YEAR, HAVE YOU BEEN HUMILIATED OR EMOTIONALLY ABUSED IN OTHER WAYS BY YOUR PARTNER OR EX-PARTNER?: NO
WITHIN THE LAST YEAR, HAVE TO BEEN RAPED OR FORCED TO HAVE ANY KIND OF SEXUAL ACTIVITY BY YOUR PARTNER OR EX-PARTNER?: NO
WITHIN THE LAST YEAR, HAVE YOU BEEN KICKED, HIT, SLAPPED, OR OTHERWISE PHYSICALLY HURT BY YOUR PARTNER OR EX-PARTNER?: NO
WITHIN THE PAST 12 MONTHS, THE FOOD YOU BOUGHT JUST DIDN'T LAST AND YOU DIDN'T HAVE MONEY TO GET MORE: NEVER TRUE
WITHIN THE LAST YEAR, HAVE YOU BEEN AFRAID OF YOUR PARTNER OR EX-PARTNER?: NO
IN THE PAST 12 MONTHS, HAS THE ELECTRIC, GAS, OIL, OR WATER COMPANY THREATENED TO SHUT OFF SERVICE IN YOUR HOME?: NO
WITHIN THE PAST 12 MONTHS, YOU WORRIED THAT YOUR FOOD WOULD RUN OUT BEFORE YOU GOT THE MONEY TO BUY MORE: NEVER TRUE

## 2025-02-09 ASSESSMENT — PAIN DESCRIPTION - PAIN TYPE: TYPE: ACUTE PAIN

## 2025-02-09 NOTE — PROGRESS NOTES
0710- received report from DMITRY Field. Discussed POC. Pt denies pain  0815- resident at bedside, discussed getting pediatric appt. Orders to call tomorrow after discharge and schedule appt for tues or wed 1800- MRI transport called to say they will be here about 1845 to take baby to MRI  - Discussed POC w/ charge, this patient will have to discharge tonight but we can possibly room her in after the MRI so that she can stay bedside w/ her baby. Offered the patient to discharge home and have baby stay in nursery until we get results back from MRI, patient stated she doesn't want to do that.

## 2025-02-09 NOTE — PROGRESS NOTES
This RN to patient's room to help with infant feeding and diaper change. Patient had already started the bottle feeding and stated that she had already changed the infant's diaper and documented on I&O sheet. Patient declined assistance with bottle feed and stated she did not have any questions.

## 2025-02-09 NOTE — CARE PLAN
The patient is Stable - Low risk of patient condition declining or worsening    Shift Goals  Clinical Goals: lochia WDL, feed baby Q3 hr  Patient Goals: rest  Family Goals: support    Progress made toward(s) clinical / shift goals:    Problem: Pain - Standard  Goal: Alleviation of pain or a reduction in pain to the patient’s comfort goal  Description: Target End Date:  Prior to discharge or change in level of care    Document on Vitals flowsheet    1.  Document pain using the appropriate pain scale per order or unit policy  2.  Educate and implement non-pharmacologic comfort measures (i.e. relaxation, distraction, massage, cold/heat therapy, etc.)  3.  Pain management medications as ordered  4.  Reassess pain after pain med administration per policy  5.  If opiods administered assess patient's response to pain medication is appropriate per POSS sedation scale  6.  Follow pain management plan developed in collaboration with patient and interdisciplinary team (including palliative care or pain specialists if applicable)  Outcome: Progressing     Problem: Knowledge Deficit - Postpartum  Goal: Patient will verbalize and demonstrate understanding of self and infant care  Description: Target End Date:  1-3 days or as soon as patient condition allows    Document in Patient Education    1.  Assess patient and knowledge of self and infant care  2.  Educate patient verbally, by demonstration and written material  Outcome: Progressing     Problem: Psychosocial - Postpartum  Goal: Patient will verbalize and demonstrate effective bonding and parenting behavior  Description: Target End Date:  1 to 4 days    1.  Assess patient for anxiety or apprehension regarding parenting role  2.  Provide emotional support and encouragement to patient/family/caregiver  Outcome: Progressing     Problem: Altered Physiologic Condition  Goal: Patient physiologically stable as evidenced by normal lochia, palpable uterine involution and vitals within  normal limits  Description: Target End Date:  1 to 4 days    Document on Assessment flowsheet    1.  Perform physical assessment and obtain vitals per intrapartum/postpartum standards of care  2.  Follow epidural/spinal narcotic protocol if patient has received a long acting narcotic  3.  Massage fundus as necessary to prevent excessive lochia  4.  Administer pitocin, methergine, cytotec or hemabate as ordered  Outcome: Progressing       Patient is not progressing towards the following goals:

## 2025-02-09 NOTE — DISCHARGE INSTRUCTIONS
PATIENT DISCHARGE EDUCATION INSTRUCTION SHEET    REASONS TO CALL YOUR OBSTETRICIAN  Persistent fever, shaking, chills (Temperature higher than 100.4) may indicate you have an infection  Heavy bleeding: soaking more than 1 pad per hour; Passing clots an egg-sized clot or bigger may mean you have an postpartum hemorrhage  Foul odor from vagina or bad smelling or discolored discharge or blood  Breast infection (Mastitis symptoms); breast pain, chills, fever, redness or red streaks, may feel flu like symptoms  Urinary pain, burning or frequency  Incision that is not healing, increased redness, swelling, tenderness or pain, or any pus from episiotomy or  site may mean you have an infection  Redness, swelling, warmth, or painful to touch in the calf area of your leg may mean you have a blood clot  Severe or intensified depression, thoughts or feelings of wanting to hurt yourself or someone else   Pain in chest, obstructed breathing or shortness of breath (trouble catching your breath) may mean you are having a postpartum complication. Call your provider immediately   Headache that does not get better, even after taking medicine, a bad headache with vision changes or pain in the upper right area of your belly may mean you have high blood pressure or post birth preeclampsia. Call your provider immediately    HAND WASHING  All family and friends should wash their hands:  Before and after holding the baby  Before feeding the baby  After using the restroom or changing the baby's diaper    WOUND CARE  Ask your physician for additional care instructions. In general:   Incision:  May shower and pat incision dry   Keep the incision clean and dry  There should not be any opening or pus from the incision  Continue to walk at home 3 times a day   Do NOT lift anything heavier than your baby (over 10 pounds)  Encourage family to help participate in care of the  to allow rest and mom time to  heal  Episiotomy/Laceration  May use fred-spray bottle, witch hazel pads and dermaplast spray for comfort  Use fred-spray bottle after urinating to cleanse perineal area  To prevent burning during urination spray fred-water bottle on labial area   Pat perineal area dry until episiotomy/laceration is healed  Continue to use fred-bottle until bleeding stops as needed  If have a 2nd degree laceration or greater, a Sitz bath can offer relief from soreness, burning, and inflammation   Sitz Bath   Sit in 6 inches of warm water and soak laceration as needed until the laceration heals    VAGINAL CARE AND BLEEDING  Nothing inside vagina for 6 weeks:   No sexual intercourse, tampons or douching  Bleeding may continue for 2-4 weeks. Amount and color may vary  Soaking 1 pad or more in an hour for several hours is considered heavy bleeding  Passing large egg sized blood clots can be concerning  If you feel like you have heavy bleeding or are having increasing amount of blood clots call your Obstetrician immediately  If you begin feeling faint upon standing, feeling sick to your stomach, have clammy skin, a really fast heartbeat, have chills, start feeling confused, dizzy, sleepy or weak, or feeling like you're going to faint call your Obstetrician immediately    HYPERTENSION   Preeclampsia or gestational hypertension are types of high blood pressure that only pregnant women can get. It is important for you to be aware of symptoms to seek early intervention and treatment. If you have any of these symptoms immediately call your Obstetrician    Vision changes or blurred vision   Severe headache or pain that is unrelieved with medication and will not go away  Persistent pain in upper abdomen or shoulder   Increased swelling of face, feet, or hands  Difficulty breathing or shortness of breath at rest  Urinating less than usual    URINATION AND BOWEL MOVEMENTS  Eating more fiber (bran cereal, fruits, and vegetables) and drinking  "plenty of fluids will help to avoid constipation  Urinary frequency and urgency after childbirth is normal  If you experience any urinary pain, burning or frequency call your provider    BIRTH CONTROL  It is possible to become pregnant at any time after delivery and while breastfeeding  Plan to discuss a method of birth control with your physician at your post delivery follow up visit    POSTPARTUM BLUES  During the first few days after birth, you may experience a sense of the \"blues\" which may include impatience, irritability or even crying. These feelings come and go quickly. However, as many as 1 in 10 women experience emotional symptoms known as postpartum depression.     POSTPARTUM DEPRESSION    May start as early as the second or third day after delivery or take several weeks or months to develop. Symptoms of \"blues\" are present, but are more intense: Crying spells; loss of appetite; feelings of hopelessness or loss of control; fear of touching the baby; over concern or no concern at all about the baby; little or no concern about your own appearance/caring for yourself; and/or inability to sleep or excessive sleeping. Contact your Obstetrician if you are experiencing any of these symptoms     PREVENTING SHAKEN BABY  If you are angry or stressed, PUT THE BABY IN THE CRIB, step away, take some deep breaths, and wait until you are calm to care for the baby. DO NOT SHAKE THE BABY. You are not alone, call a supporter for help.  [facility]  crisis call line (572-287-7737) or (1-138.270.3122)  You can also text them, text \"ANSWER\" (289115) PATIENT DISCHARGE EDUCATION INSTRUCTION SHEET    REASONS TO CALL YOUR PEDIATRICIAN  Projectile or forceful vomiting for more than one feeding  Unusual rash lasting more than 24 hours  Very sleepy, difficult to wake up  Bright yellow or pumpkin colored skin with extreme sleepiness  Temperature below 97.6 or above 100.4 F rectally  Feeding problems  Breathing " problems  Excessive crying with no known cause  If cord starts to become red, swollen, develops a smell or discharge  No wet diaper or stool in a 24 hour time period     SAFE SLEEP POSITIONING FOR YOUR BABY  The [employer] advises your baby should be placed on his/her back for  Sleeping to reduce the risk of Sudden Infant Death Syndrome (SIDS)  Baby should sleep by themselves in a crib, portable crib or bassinet  Baby should not share a bed with his/her parents  Baby should be placed on his or her back to sleep, night time and at naps  Baby should sleep on firm mattress with a tightly fitted sheet  NO couches, waterbeds or anything soft  Baby's sleep area should not contain any loose blankets, comforters, stuffed animals or any other soft items, (pillows, bumper pads, etc. ...)  Baby's face should be kept uncovered at all times  Baby should sleep in a smoke-free environment  Do not dress baby too warmly to prevent overheating    HAND WASHING  All family and friends should wash their hands:  Before and after holding the baby  Before feeding the baby  After using the restroom or changing the baby's diaper    TAKING BABY'S TEMPERATURE   If you feel your baby may have a fever take your baby's temperature per thermometer instructions  If taking axillary temperature place thermometer under baby's armpit and hold arm close to body  The most precise and accurate way to take a temperature is rectally  Turn on the digital thermometer and lubricate the tip of the thermometer with petroleum jelly.  Lay your baby or child on his or her back, lift his or her thighs, and insert the lubricated thermometer 1/2 to 1 inch (1.3 to 2.5 centimeters) into the rectum  Call your Pediatrician for temperature lower than 97.6 or greater than 100.4 F rectally    BATHE AND SHAMPOO BABY  Gently wash baby with a soft cloth using warm water and mild soap - rinse well  Do not put baby in tub bath until umbilical cord falls off  and appears well-healed  Bathing baby 2-3 times a week might be enough until your baby becomes more mobile. Bathing your baby too much can dry out his or her skin     NAIL CARE  First recommendation is to keep them covered to prevent facial scratching  During the first few weeks,  nails are very soft. Doctors recommend using only a fine emery board. Don't bite or tear your baby's nails. When your baby's nails are stronger, after a few weeks, you can switch to clippers or scissors making sure not to cut too short and nip the quick   A good time for nail care is while your baby is sleeping and moving less     CORD CARE  Fold diaper below umbilical cord until cord falls off  Keep umbilical cord clean and dry  May see a small amount of crust around the base of the cord. Clean off with mild soap and water and dry       DIAPER AND DRESS BABY  For baby girls: gently wipe from front to back. Mucous or pink tinged drainage is normal  For uncircumcised baby boys: do NOT pull back the foreskin to clean the penis. Gently clean with wipes or warm, soapy water  Dress baby in one more layer of clothing than you are wearing  Use a hat to protect from sun or cold. NO ties or drawstrings    URINATION AND BOWEL MOVEMENTS  If formula feeding or when breast milk feeding is established, your baby should wet 6-8 diapers a day and have at least 2 bowel movements a day during the first month  Bowel movements color and type can vary from day to day    CIRCUMCISION  If your child was circumcised watch out for the following:  Foul smelling discharge  Fever  Swelling   Crusty, fluid filled sores  Trouble urinating   Persistent bleeding or more than a quarter size spot of blood on his diaper  Yellow discharge lasting more than a week  Continue with care procedures until healed or have a visit with your Pediatrician     INFANT FEEDING  Most newborns feed 8-12 times, every 24 hours. YOU MAY NEED TO WAKE YOUR BABY UP TO FEED  If  breastfeeding, offer both breasts when your baby is showing feeding cues, such as rooting or bringing hand to mouth and sucking  Common for  babies to feed every 1-3 hours   Only allow baby to sleep up to 4 hours in between feeds if baby is feeding well at each feed. Offer breast anytime baby is showing feeding cues and at least every 3 hours  Follow up with outpatient [facility] for continued breast feeding support    FORMULA FEEDING  Feed baby formula every 2-3 hours when your baby is showing feeding cues  Paced bottle feeding will help baby not over eat at each feed     BOTTLE FEEDING   Paced Bottle Feeding is a method of bottle feeding that allows the infant to be more in control of the feeding pace. This feeding method slows down the flow of milk into the nipple and the mouth, allowing the baby to eat more slowly, and take breaks. Paced feeding reduces the risk of overfeeding that may result in discomfort for the baby   Hold baby almost upright or slightly reclined position supporting the head and neck  Use a small nipple for slow-flowing. Slow flow nipple holes help in controlling flow   Don't force the bottle's nipple into your baby's mouth. Tickle babies lip so baby opens their mouth  Insert nipple and hold the bottle flat  Let the baby suck three to four times without milk then tip the bottle just enough to fill the nipple about snf with milk  Let baby suck 3-5 continuous swallows, about 20-30 seconds tip the bottle down to give the baby a break  After a few seconds, when the baby begins to suck again, tip bottle up to allow milk to flow into the nipple  Continue to Pace feed until baby shows signs of fullness; no longer sucking after a break, turning away or pushing away the nipple   Bottle propping is not a recommended practice for feeding  Bottle propping is when you give a baby a bottle by leaning the bottle against a pillow, or other support, rather than holding the baby and the  "bottle.  Forces your baby to keep up with the flow, even if the baby is full   This can increase your baby's risk of choking, ear infections, and tooth decay    BOTTLE PREPARATION   Never feed  formula to your baby, or use formula if the container is dented  When using ready-to-feed, shake formula containers before opening  If formula is in a can, clean the lid of any dust, and be sure the can opener is clean  Formula does not need to be warmed. If you choose to feed warmed formula, do not microwave it. This can cause \"hot spots\" that could burn your baby. Instead, set the filled bottle in a bowl of warm (not boiling) water or hold the bottle under warm tap water. Sprinkle a few drops of formula on the inside of your wrist to make sure it's not too hot  Measure and pour desired amount of water into baby bottle  Add unpacked, level scoop(s) of powder to the bottle as directed on formula container. Return dry scoop to can  Put the cap on the bottle and shake. Move your wrist in a twisting motion helps powder formula mix more quickly and more thoroughly  Feed or store immediately in refrigerator  You need to sterilize bottles, nipples, rings, etc., only before the first use    CLEANING BOTTLE  Use hot, soapy water  Rinse the bottles and attachments separately and clean with a bottle brush  If your bottles are labelled  safe, you can alternatively go ahead and wash them in the    After washing, rinse the bottle parts thoroughly in hot running water to remove any bubbles or soap residue   Place the parts on a bottle drying rack   Make sure the bottles are left to drain in a well-ventilated location to ensure that they dry thoroughly    CAR SEAT  For your baby's safety and to comply with [place] State Law you will need to bring a car seat to the hospital before taking your baby home. Please read your car seat instructions before your baby's discharge from the hospital.  Make sure you place an " emergency contact sticker on your baby's car seat with your baby's identifying information  Car seat should not be placed in the front seat of a vehicle. The car seat should be placed in the back seat in the rear-facing position.  Car seat information is available through [employer] at [phone] and also at [email or web address]

## 2025-02-09 NOTE — PROGRESS NOTES
Assessment completed. Fundus is firm, lochia light, patient voiding normally. Pt ambulating independently. Bands verified matching baby. Needs are met at this time. Encouraged to call with any needs.

## 2025-02-09 NOTE — CARE PLAN
The patient is Stable - Low risk of patient condition declining or worsening    Shift Goals  Clinical Goals: pain management, ambulation, provide cares for infant if planning on taking infant home  Patient Goals: pain controlled, rest    Progress made toward(s) clinical / shift goals:    Problem: Altered Physiologic Condition  Goal: Patient physiologically stable as evidenced by normal lochia, palpable uterine involution and vitals within normal limits  Outcome: Progressing  Note: Fundus is firm, lochia is light, and all VS remain stable.        Patient is not progressing towards the following goals:

## 2025-02-09 NOTE — CARE PLAN
The patient is Stable - Low risk of patient condition declining or worsening    Shift Goals  Clinical Goals: lochia WDL, feed baby Q3 hr  Patient Goals: rest  Family Goals: support    Progress made toward(s) clinical / shift goals:    Problem: Knowledge Deficit - L&D  Goal: Patient and family/caregivers will demonstrate understanding of plan of care, disease process/condition, diagnostic tests and medications  Description: Target End Date:  1-3 days or as soon as patient condition allows    1.  Oriented to unit, equipment, visitation policy and means for communicating concern  2.  Complete/review Learning Assessment  3.  Assess patient's preparation, knowledge level and expectations  4.  Provide accurate information in basic terms, clarify misconceptions  5.  Explain disease process/condition, treatment plan, diagnostic tests and medications  6.  Encourage patient and support person to ask questions and verbalize their understanding  7.  Instruct patient/support person in basic interpretation of fetal monitor  8.  Obtain informed consent when appropriate  9.  Demonstrate breathing/relaxation techniques  Outcome: Progressing     Problem: Risk for Excess Fluid Volume  Goal: Patient will demonstrate pulse, blood pressure and neurologic signs within expected ranges and without any respiratory complications  Description: 1.  Monitor for signs of hypertension and tachycardia; observe for signs of dyspnea; auscultate for signs of stridor, rhonchi or moist crackles  2.  Monitor for the intake/output.  Check the infusion rate of the fluids manually or preferably through the use of infusion pumps  3.  Monitor hemoglobin/hematocrit and platelets   Outcome: Progressing     Problem: Knowledge Deficit - Standard  Goal: Patient and family/care givers will demonstrate understanding of plan of care, disease process/condition, diagnostic tests and medications  Description: Target End Date:  1-3 days or as soon as patient condition  allows    Document in Patient Education    1.  Patient and family/caregiver oriented to unit, equipment, visitation policy and means for communicating concern  2.  Complete/review Learning Assessment  3.  Assess knowledge level of disease process/condition, treatment plan, diagnostic tests and medications  4.  Explain disease process/condition, treatment plan, diagnostic tests and medications  Outcome: Progressing  Note: Continued education regarding care of baby, care of self     Problem: Pain - Standard  Goal: Alleviation of pain or a reduction in pain to the patient’s comfort goal  Description: Target End Date:  Prior to discharge or change in level of care    Document on Vitals flowsheet    1.  Document pain using the appropriate pain scale per order or unit policy  2.  Educate and implement non-pharmacologic comfort measures (i.e. relaxation, distraction, massage, cold/heat therapy, etc.)  3.  Pain management medications as ordered  4.  Reassess pain after pain med administration per policy  5.  If opiods administered assess patient's response to pain medication is appropriate per POSS sedation scale  6.  Follow pain management plan developed in collaboration with patient and interdisciplinary team (including palliative care or pain specialists if applicable)  Outcome: Progressing  Note: Pt denies pain     Problem: Knowledge Deficit - Postpartum  Goal: Patient will verbalize and demonstrate understanding of self and infant care  Description: Target End Date:  1-3 days or as soon as patient condition allows    Document in Patient Education    1.  Assess patient and knowledge of self and infant care  2.  Educate patient verbally, by demonstration and written material  Outcome: Progressing     Problem: Psychosocial - Postpartum  Goal: Patient will verbalize and demonstrate effective bonding and parenting behavior  Description: Target End Date:  1 to 4 days    1.  Assess patient for anxiety or apprehension  regarding parenting role  2.  Provide emotional support and encouragement to patient/family/caregiver  Outcome: Progressing     Problem: Altered Physiologic Condition  Goal: Patient physiologically stable as evidenced by normal lochia, palpable uterine involution and vitals within normal limits  Description: Target End Date:  1 to 4 days    Document on Assessment flowsheet    1.  Perform physical assessment and obtain vitals per intrapartum/postpartum standards of care  2.  Follow epidural/spinal narcotic protocol if patient has received a long acting narcotic  3.  Massage fundus as necessary to prevent excessive lochia  4.  Administer pitocin, methergine, cytotec or hemabate as ordered  Outcome: Progressing     Problem: Infection - Postpartum  Goal: Postpartum patient will be free of signs and symptoms of infection  Description: Target End Date:  Target End Date:  1 to 4 days  1.  Instruct patient in pericare/incisional care  2.  Give antibiotics as indicated an ordered  3.  Get patient out of bed and ambulating as ordered  Outcome: Progressing       Patient is not progressing towards the following goals:N/A

## 2025-02-09 NOTE — DISCHARGE SUMMARY
[facility]  Obstetrics Discharge Summary    Date of Admission: 2025  Date of Discharge: 25    Admitting diagnosis:         1. Pregnancy at Unknown  2. Limited prenatal care  3. Anemia  4. History of postpartum hemorrhage with previous delivery     Discharge Diagnosis:   1. Status post breech vaginal delivery, spontaneous.  2. Acute blood loss anemia requiring transfusion     Hospital Course:   Pt is 30 y.o. now  who presented on 2025 for active labor with no prenatal care.   During a vaginal exam patient started pushing spontaneously and SROM occurred and a fetal foot was noted to be protruding from the vagina. MD was called for backup and baby was delivered. A large gush of blood was noted after delivery, pt was given TXA and cytotec. No lacerations were noted.      After delivery patient had a drop in hemoglobin from 7.8 to 6.6. She was transfused 2 units of pRBC with an improvement in hemoglobin to 9.3.      Postpartum course was unremarkable and patient has met all postpartum milestones.  Patient had early ambulation, well managed pain, tolerance of diet, spontaneous voiding.  She has remained afebrile and blood pressure has been well controlled.   All maternal questions and concerns addressed.     Single female infant was delivered via breech vaginal delivery on 25 at 0227 with APGARs 8 and 9 at 1 and 5 minutes respectively.    ml    PHYSICAL EXAM:  Temp:  [36.8 °C (98.2 °F)] 36.8 °C (98.2 °F)  Pulse:  [72-78] 72  Resp:  [17-18] 17  BP: (104-107)/(65-69) 107/69  SpO2:  [98 %] 98 %    GEN: well appearing, no apparent distress  CV: +S1S2, RRR, no BLE edema  RESP: CTAB, breathing comfortably on RA  ABD: soft, non-tender, non-distended, +BS  Fundus: firm below level of umbilicus  Incision: not applicable, (vaginal delivery)  Perineum: Deferred  Extremities: symmetric, calves nontender    HISTORY:  Patient Active Problem List   Diagnosis   (none) - all problems resolved or  deleted      Past Medical History:   Diagnosis Date    Urinary tract infection      OB History    Para Term  AB Living   5 5 3     4   SAB IAB Ectopic Molar Multiple Live Births           0 4      # Outcome Date GA Lbr Sudeep/2nd Weight Sex Type Anes PTL Lv   5 Para 25   2.74 kg (6 lb 0.7 oz) F Vag-Breech None  HUGO   4 Term 04/10/24 38w0d / 00:14 3.285 kg (7 lb 3.9 oz) M Vag-Spont None N HUGO   3 Term 23 38w4d 02:28 / 00:05 3.025 kg (6 lb 10.7 oz) F Vag-Spont None N HUGO   2 Para 21 19w5d  0 kg () M Vag-Spont None Y FD      Birth Comments: 0.15 g    1 Term 20 39w2d / 00:10 3.265 kg (7 lb 3.2 oz) F Vag-Vacuum None N HUGO     Past Surgical History:   Procedure Laterality Date    DILATION AND CURETTAGE N/A 4/10/2024    Procedure: DILATION AND CURETTAGE;  Surgeon: Sharona Nice D.O.;  Location: [facility] DELIV[facility];  Service: Obstetrics     No Known Allergies   Current Facility-Administered Medications   Medication Dose    ferrous sulfate tablet 325 mg  325 mg    LR infusion      oxytocin (Pitocin) infusion (for post delivery)  125 mL/hr    lactated ringers infusion  2,000 mL    docusate sodium (Colace) capsule 100 mg  100 mg    ibuprofen (Motrin) tablet 800 mg  800 mg    acetaminophen (Tylenol) tablet 1,000 mg  1,000 mg    tetanus-dipth-acell pertussis (Tdap) inj 0.5 mL  0.5 mL     Recent Labs     25  0230 25  0545 25  1743   WBC 15.4* 17.0* 16.2*   RBC 4.27 3.55* 4.32   HEMOGLOBIN 7.8* 6.6* 9.3*   HEMATOCRIT 27.4* 23.5* 30.1*   MCV 64.2* 66.2* 69.7*   MCH 18.3* 18.6* 21.5*   MCHC 28.5* 28.1* 30.9*   RDW 47.2 47.9 61.4*   PLATELETCT 270 239 228   MPV 9.2 9.4 9.3       Discharge Meds:   Current Outpatient Medications   Medication Sig Dispense Refill    acetaminophen (TYLENOL) 500 MG Tab Take 2 Tablets by mouth every 6 hours as needed for Mild Pain or Moderate Pain. 120 Tablet 2    ibuprofen (MOTRIN) 800 MG Tab Take 1 Tablet by mouth every 8 hours as needed for  Mild Pain or Moderate Pain. 120 Tablet 2    docusate sodium 100 MG Cap Take 1 capsule by mouth 2 times a day as needed for Constipation. 60 Capsule 0    ferrous sulfate 325 (65 Fe) MG tablet Take 1 Tablet by mouth every day. 60 Tablet 2    ascorbic acid (VITAMIN C) 500 MG tablet Take 1 Tablet by mouth every day. 60 Tablet 2           Activity/ Discharge Instructions:  Discharge to home  Exercise and Activities as tolerated  Pelvic Rest x 6 weeks  No heavy lifting x4 weeks  Call or come to ED for: heavy vaginal bleeding, fever >100.4, severe abdominal pain, severe headache, chest pain, shortness of breath, significant nausea or vomiting, incisional drainage, or other concerns.  Contraception: Pt received Depo prior to discharge    Adopting Out  Patient plans to adopt baby out to FOB's aunt. No legal actions have been taken at this time, social work has been consulted and has cleared parents to take baby home for private adoption.     Diet:  As tolerated. Additional 400 kcal per day to maintain milk supply. Drink plenty of fluids daily.  Continue iron and vitamin C every other day for six months or until anemia improves.     Follow up:    Patient has had no prenatal care, recommend follow up sooner than typical 5 weeks for vaginal delivery. Patient should follow up in 1-2 weeks.    Iwona Schaefer DO  PGY-1 Family Medicine Resident  [facility]

## 2025-02-10 NOTE — CARE PLAN
The patient is Stable - Low risk of patient condition declining or worsening    Shift Goals  Clinical Goals: VSS, lochia WNL  Patient Goals: rest  Family Goals: support    Progress made toward(s) clinical / shift goals:    Problem: Knowledge Deficit - L&D  Goal: Patient and family/caregivers will demonstrate understanding of plan of care, disease process/condition, diagnostic tests and medications  Description: Target End Date:  1-3 days or as soon as patient condition allows    1.  Oriented to unit, equipment, visitation policy and means for communicating concern  2.  Complete/review Learning Assessment  3.  Assess patient's preparation, knowledge level and expectations  4.  Provide accurate information in basic terms, clarify misconceptions  5.  Explain disease process/condition, treatment plan, diagnostic tests and medications  6.  Encourage patient and support person to ask questions and verbalize their understanding  7.  Instruct patient/support person in basic interpretation of fetal monitor  8.  Obtain informed consent when appropriate  9.  Demonstrate breathing/relaxation techniques  2/9/2025 1809 by Fabby Julio R.N.  Outcome: Met  2/9/2025 1140 by Fabby Julio R.N.  Outcome: Progressing     Problem: Risk for Excess Fluid Volume  Goal: Patient will demonstrate pulse, blood pressure and neurologic signs within expected ranges and without any respiratory complications  Description: 1.  Monitor for signs of hypertension and tachycardia; observe for signs of dyspnea; auscultate for signs of stridor, rhonchi or moist crackles  2.  Monitor for the intake/output.  Check the infusion rate of the fluids manually or preferably through the use of infusion pumps  3.  Monitor hemoglobin/hematocrit and platelets   2/9/2025 1809 by Fabby Julio R.N.  Outcome: Met  2/9/2025 1140 by Fabby Julio R.N.  Outcome: Progressing     Problem: Knowledge Deficit - Standard  Goal: Patient and family/care givers  will demonstrate understanding of plan of care, disease process/condition, diagnostic tests and medications  Description: Target End Date:  1-3 days or as soon as patient condition allows    Document in Patient Education    1.  Patient and family/caregiver oriented to unit, equipment, visitation policy and means for communicating concern  2.  Complete/review Learning Assessment  3.  Assess knowledge level of disease process/condition, treatment plan, diagnostic tests and medications  4.  Explain disease process/condition, treatment plan, diagnostic tests and medications  2/9/2025 1809 by Fabby Julio R.N.  Outcome: Met  2/9/2025 1140 by Fabby Julio R.N.  Outcome: Progressing  Note: Continued education regarding care of baby, care of self     Problem: Pain - Standard  Goal: Alleviation of pain or a reduction in pain to the patient’s comfort goal  Description: Target End Date:  Prior to discharge or change in level of care    Document on Vitals flowsheet    1.  Document pain using the appropriate pain scale per order or unit policy  2.  Educate and implement non-pharmacologic comfort measures (i.e. relaxation, distraction, massage, cold/heat therapy, etc.)  3.  Pain management medications as ordered  4.  Reassess pain after pain med administration per policy  5.  If opiods administered assess patient's response to pain medication is appropriate per POSS sedation scale  6.  Follow pain management plan developed in collaboration with patient and interdisciplinary team (including palliative care or pain specialists if applicable)  2/9/2025 1809 by Fabby Julio R.N.  Outcome: Met  2/9/2025 1140 by Fabby Julio R.N.  Outcome: Progressing  Note: Pt denies pain     Problem: Knowledge Deficit - Postpartum  Goal: Patient will verbalize and demonstrate understanding of self and infant care  Description: Target End Date:  1-3 days or as soon as patient condition allows    Document in Patient Education    1.   Assess patient and knowledge of self and infant care  2.  Educate patient verbally, by demonstration and written material  2/9/2025 1809 by Fabby Julio R.N.  Outcome: Met  2/9/2025 1140 by Fabby Julio R.N.  Outcome: Progressing     Problem: Psychosocial - Postpartum  Goal: Patient will verbalize and demonstrate effective bonding and parenting behavior  Description: Target End Date:  1 to 4 days    1.  Assess patient for anxiety or apprehension regarding parenting role  2.  Provide emotional support and encouragement to patient/family/caregiver  2/9/2025 1809 by Fabby Julio R.N.  Outcome: Met  2/9/2025 1140 by Fabby Julio R.N.  Outcome: Progressing     Problem: Altered Physiologic Condition  Goal: Patient physiologically stable as evidenced by normal lochia, palpable uterine involution and vitals within normal limits  Description: Target End Date:  1 to 4 days    Document on Assessment flowsheet    1.  Perform physical assessment and obtain vitals per intrapartum/postpartum standards of care  2.  Follow epidural/spinal narcotic protocol if patient has received a long acting narcotic  3.  Massage fundus as necessary to prevent excessive lochia  4.  Administer pitocin, methergine, cytotec or hemabate as ordered  2/9/2025 1809 by Fabby Julio R.N.  Outcome: Met  2/9/2025 1140 by Fabby Julio R.N.  Outcome: Progressing     Problem: Infection - Postpartum  Goal: Postpartum patient will be free of signs and symptoms of infection  Description: Target End Date:  Target End Date:  1 to 4 days  1.  Instruct patient in pericare/incisional care  2.  Give antibiotics as indicated an ordered  3.  Get patient out of bed and ambulating as ordered  2/9/2025 1809 by Fabby Julio R.N.  Outcome: Met  2/9/2025 1140 by Fabby Julio R.N.  Outcome: Progressing       Patient is not progressing towards the following goals:N/A

## 2025-02-10 NOTE — PROGRESS NOTES
Discharge paperwork for infant and mom discussed at bedside. All questions answered. Follow-up appointments reviewed. Parents aware of NBS #2 and dates to complete it by. Paperwork signed and dated at this time.    2300- Patient and infant discharged with escort off unit. Infant discharged in car seat, patient in wheelchair. Infant placed in car seat by parents and checked by RN. Cuddles removed. Bands verified. All questions answered at this time.

## 2025-02-11 ENCOUNTER — TELEPHONE (OUTPATIENT)
Dept: OBGYN | Facility: CLINIC | Age: 30
End: 2025-02-11
Payer: MEDICAID

## 2025-02-24 ENCOUNTER — APPOINTMENT (OUTPATIENT)
Dept: RADIOLOGY | Facility: MEDICAL CENTER | Age: 30
End: 2025-02-24
Attending: EMERGENCY MEDICINE
Payer: MEDICAID

## 2025-02-24 ENCOUNTER — HOSPITAL ENCOUNTER (INPATIENT)
Facility: MEDICAL CENTER | Age: 30
LOS: 2 days | End: 2025-02-26
Attending: EMERGENCY MEDICINE | Admitting: STUDENT IN AN ORGANIZED HEALTH CARE EDUCATION/TRAINING PROGRAM
Payer: MEDICAID

## 2025-02-24 DIAGNOSIS — E87.20 METABOLIC ACIDOSIS: ICD-10-CM

## 2025-02-24 DIAGNOSIS — R51.9 SEVERE HEADACHE: ICD-10-CM

## 2025-02-24 DIAGNOSIS — R41.0 DELIRIUM: ICD-10-CM

## 2025-02-24 DIAGNOSIS — E87.6 HYPOKALEMIA: ICD-10-CM

## 2025-02-24 DIAGNOSIS — N39.0 ACUTE UTI: ICD-10-CM

## 2025-02-24 PROBLEM — A41.9 SEPSIS (HCC): Status: ACTIVE | Noted: 2025-02-24

## 2025-02-24 PROBLEM — I95.9 HYPOTENSION: Status: ACTIVE | Noted: 2025-02-24

## 2025-02-24 PROBLEM — J18.9 PNEUMONIA DUE TO INFECTIOUS ORGANISM: Status: ACTIVE | Noted: 2025-02-24

## 2025-02-24 PROBLEM — N17.9 AKI (ACUTE KIDNEY INJURY) (HCC): Status: ACTIVE | Noted: 2025-02-24

## 2025-02-24 PROBLEM — E87.8 REFEEDING SYNDROME: Status: ACTIVE | Noted: 2025-02-24

## 2025-02-24 PROBLEM — E83.51 HYPOCALCEMIA: Status: ACTIVE | Noted: 2025-02-24

## 2025-02-24 PROBLEM — E87.29 HIGH ANION GAP METABOLIC ACIDOSIS: Status: ACTIVE | Noted: 2025-02-24

## 2025-02-24 LAB
ABO GROUP BLD: NORMAL
ALBUMIN SERPL BCP-MCNC: 4.3 G/DL (ref 3.2–4.9)
ALBUMIN/GLOB SERPL: 1 G/DL
ALP SERPL-CCNC: 151 U/L (ref 30–99)
ALT SERPL-CCNC: 18 U/L (ref 2–50)
AMPHET UR QL SCN: NEGATIVE
ANION GAP SERPL CALC-SCNC: 11 MMOL/L (ref 7–16)
ANION GAP SERPL CALC-SCNC: 21 MMOL/L (ref 7–16)
ANISOCYTOSIS BLD QL SMEAR: ABNORMAL
APPEARANCE UR: CLEAR
APTT PPP: 28.8 SEC (ref 24.7–36)
AST SERPL-CCNC: 41 U/L (ref 12–45)
BACTERIA #/AREA URNS HPF: ABNORMAL /HPF
BACTERIA BLD CULT: NORMAL
BACTERIA BLD CULT: NORMAL
BARBITURATES UR QL SCN: NEGATIVE
BASE EXCESS BLDV CALC-SCNC: -8 MMOL/L (ref -2–3)
BASOPHILS # BLD AUTO: 0.8 % (ref 0–1.8)
BASOPHILS # BLD: 0.05 K/UL (ref 0–0.12)
BENZODIAZ UR QL SCN: NEGATIVE
BILIRUB SERPL-MCNC: 1 MG/DL (ref 0.1–1.5)
BILIRUB UR QL STRIP.AUTO: NEGATIVE
BLD GP AB SCN SERPL QL: NORMAL
BODY TEMPERATURE: 37.5 CENTIGRADE
BUN SERPL-MCNC: 13 MG/DL (ref 8–22)
BUN SERPL-MCNC: 14 MG/DL (ref 8–22)
BZE UR QL SCN: NEGATIVE
CALCIUM ALBUM COR SERPL-MCNC: 8.8 MG/DL (ref 8.5–10.5)
CALCIUM SERPL-MCNC: 7.4 MG/DL (ref 8.5–10.5)
CALCIUM SERPL-MCNC: 9 MG/DL (ref 8.5–10.5)
CANNABINOIDS UR QL SCN: NEGATIVE
CASTS URNS QL MICRO: ABNORMAL /LPF (ref 0–2)
CHLORIDE SERPL-SCNC: 102 MMOL/L (ref 96–112)
CHLORIDE SERPL-SCNC: 114 MMOL/L (ref 96–112)
CK SERPL-CCNC: 171 U/L (ref 0–154)
CO2 SERPL-SCNC: 13 MMOL/L (ref 20–33)
CO2 SERPL-SCNC: 14 MMOL/L (ref 20–33)
COLOR UR: YELLOW
CORTIS SERPL-MCNC: 20.6 UG/DL (ref 0–23)
CREAT SERPL-MCNC: 0.86 MG/DL (ref 0.5–1.4)
CREAT SERPL-MCNC: 1.2 MG/DL (ref 0.5–1.4)
EKG IMPRESSION: NORMAL
EOSINOPHIL # BLD AUTO: 0 K/UL (ref 0–0.51)
EOSINOPHIL NFR BLD: 0 % (ref 0–6.9)
EPITHELIAL CELLS 1715: ABNORMAL /HPF (ref 0–5)
ERYTHROCYTE [DISTWIDTH] IN BLOOD BY AUTOMATED COUNT: 66.6 FL (ref 35.9–50)
ETHANOL BLD-MCNC: <10.1 MG/DL
FENTANYL UR QL: NEGATIVE
GFR SERPLBLD CREATININE-BSD FMLA CKD-EPI: 63 ML/MIN/1.73 M 2
GFR SERPLBLD CREATININE-BSD FMLA CKD-EPI: 94 ML/MIN/1.73 M 2
GLOBULIN SER CALC-MCNC: 4.5 G/DL (ref 1.9–3.5)
GLUCOSE BLD STRIP.AUTO-MCNC: 113 MG/DL (ref 65–99)
GLUCOSE SERPL-MCNC: 116 MG/DL (ref 65–99)
GLUCOSE SERPL-MCNC: 94 MG/DL (ref 65–99)
GLUCOSE UR STRIP.AUTO-MCNC: NEGATIVE MG/DL
HCG SERPL QL: NEGATIVE
HCO3 BLDV-SCNC: 14 MMOL/L (ref 22–29)
HCT VFR BLD AUTO: 41.3 % (ref 37–47)
HGB BLD-MCNC: 12.6 G/DL (ref 12–16)
HYPOCHROMIA BLD QL SMEAR: ABNORMAL
INR PPP: 1.17 (ref 0.87–1.13)
INR PPP: 1.32 (ref 0.87–1.13)
KETONES UR STRIP.AUTO-MCNC: NEGATIVE MG/DL
LACTATE SERPL-SCNC: 1.7 MMOL/L (ref 0.5–2)
LEUKOCYTE ESTERASE UR QL STRIP.AUTO: NEGATIVE
LIPASE SERPL-CCNC: 27 U/L (ref 11–82)
LYMPHOCYTES # BLD AUTO: 0.86 K/UL (ref 1–4.8)
LYMPHOCYTES NFR BLD: 14.1 % (ref 22–41)
MAGNESIUM SERPL-MCNC: 1.7 MG/DL (ref 1.5–2.5)
MANUAL DIFF BLD: NORMAL
MCH RBC QN AUTO: 22 PG (ref 27–33)
MCHC RBC AUTO-ENTMCNC: 30.5 G/DL (ref 32.2–35.5)
MCV RBC AUTO: 72.2 FL (ref 81.4–97.8)
METAMYELOCYTES NFR BLD MANUAL: 0.8 %
METHADONE UR QL SCN: NEGATIVE
MICRO URNS: ABNORMAL
MICROCYTES BLD QL SMEAR: ABNORMAL
MONOCYTES # BLD AUTO: 0 K/UL (ref 0–0.85)
MONOCYTES NFR BLD AUTO: 0 % (ref 0–13.4)
MORPHOLOGY BLD-IMP: NORMAL
MYELOCYTES NFR BLD MANUAL: 0.8 %
NEUTROPHILS # BLD AUTO: 5.09 K/UL (ref 1.82–7.42)
NEUTROPHILS NFR BLD: 76 % (ref 44–72)
NEUTS BAND NFR BLD MANUAL: 7.5 % (ref 0–10)
NITRITE UR QL STRIP.AUTO: POSITIVE
NRBC # BLD AUTO: 0 K/UL
NRBC BLD-RTO: 0 /100 WBC (ref 0–0.2)
OPIATES UR QL SCN: NEGATIVE
OXYCODONE UR QL SCN: NEGATIVE
PCO2 BLDV: 22.4 MMHG (ref 38–54)
PCO2 TEMP ADJ BLDV: 22.9 MMHG (ref 38–54)
PCP UR QL SCN: NEGATIVE
PH BLDV: 7.43 [PH] (ref 7.31–7.45)
PH TEMP ADJ BLDV: 7.42 [PH] (ref 7.31–7.45)
PH UR STRIP.AUTO: 6.5 [PH] (ref 5–8)
PHOSPHATE SERPL-MCNC: 1.8 MG/DL (ref 2.5–4.5)
PLATELET # BLD AUTO: 316 K/UL (ref 164–446)
PLATELET BLD QL SMEAR: NORMAL
PMV BLD AUTO: 9.7 FL (ref 9–12.9)
PO2 BLDV: 62.3 MMHG (ref 23–48)
PO2 TEMP ADJ BLDV: 64.5 MMHG (ref 23–48)
POTASSIUM SERPL-SCNC: 3.1 MMOL/L (ref 3.6–5.5)
POTASSIUM SERPL-SCNC: 3.4 MMOL/L (ref 3.6–5.5)
PROCALCITONIN SERPL-MCNC: 21.1 NG/ML
PROPOXYPH UR QL SCN: NEGATIVE
PROT SERPL-MCNC: 8.8 G/DL (ref 6–8.2)
PROT UR QL STRIP: 30 MG/DL
PROTHROMBIN TIME: 15 SEC (ref 12–14.6)
PROTHROMBIN TIME: 16.4 SEC (ref 12–14.6)
RBC # BLD AUTO: 5.72 M/UL (ref 4.2–5.4)
RBC # URNS HPF: ABNORMAL /HPF (ref 0–2)
RBC BLD AUTO: PRESENT
RBC UR QL AUTO: ABNORMAL
RH BLD: NORMAL
SAO2 % BLDV: 91.3 % (ref 60–85)
SIGNIFICANT IND 70042: NORMAL
SIGNIFICANT IND 70042: NORMAL
SITE SITE: NORMAL
SITE SITE: NORMAL
SODIUM SERPL-SCNC: 136 MMOL/L (ref 135–145)
SODIUM SERPL-SCNC: 139 MMOL/L (ref 135–145)
SOURCE SOURCE: NORMAL
SOURCE SOURCE: NORMAL
SP GR UR STRIP.AUTO: >1.045
TROPONIN T SERPL-MCNC: 25 NG/L (ref 6–19)
TROPONIN T SERPL-MCNC: 46 NG/L (ref 6–19)
UROBILINOGEN UR STRIP.AUTO-MCNC: 1 EU/DL
WBC # BLD AUTO: 6.1 K/UL (ref 4.8–10.8)
WBC #/AREA URNS HPF: ABNORMAL /HPF
WBC TOXIC VACUOLES BLD QL SMEAR: NORMAL

## 2025-02-24 PROCEDURE — 86901 BLOOD TYPING SEROLOGIC RH(D): CPT

## 2025-02-24 PROCEDURE — 96372 THER/PROPH/DIAG INJ SC/IM: CPT

## 2025-02-24 PROCEDURE — 700105 HCHG RX REV CODE 258: Performed by: EMERGENCY MEDICINE

## 2025-02-24 PROCEDURE — 87186 SC STD MICRODIL/AGAR DIL: CPT

## 2025-02-24 PROCEDURE — 700111 HCHG RX REV CODE 636 W/ 250 OVERRIDE (IP): Mod: JZ | Performed by: EMERGENCY MEDICINE

## 2025-02-24 PROCEDURE — 80048 BASIC METABOLIC PNL TOTAL CA: CPT

## 2025-02-24 PROCEDURE — 87086 URINE CULTURE/COLONY COUNT: CPT

## 2025-02-24 PROCEDURE — 86900 BLOOD TYPING SEROLOGIC ABO: CPT

## 2025-02-24 PROCEDURE — 70496 CT ANGIOGRAPHY HEAD: CPT

## 2025-02-24 PROCEDURE — 99223 1ST HOSP IP/OBS HIGH 75: CPT | Performed by: STUDENT IN AN ORGANIZED HEALTH CARE EDUCATION/TRAINING PROGRAM

## 2025-02-24 PROCEDURE — 84484 ASSAY OF TROPONIN QUANT: CPT

## 2025-02-24 PROCEDURE — 82803 BLOOD GASES ANY COMBINATION: CPT

## 2025-02-24 PROCEDURE — 82550 ASSAY OF CK (CPK): CPT

## 2025-02-24 PROCEDURE — 84703 CHORIONIC GONADOTROPIN ASSAY: CPT

## 2025-02-24 PROCEDURE — 85730 THROMBOPLASTIN TIME PARTIAL: CPT

## 2025-02-24 PROCEDURE — 770020 HCHG ROOM/CARE - TELE (206)

## 2025-02-24 PROCEDURE — 87899 AGENT NOS ASSAY W/OPTIC: CPT

## 2025-02-24 PROCEDURE — 80307 DRUG TEST PRSMV CHEM ANLYZR: CPT

## 2025-02-24 PROCEDURE — 85610 PROTHROMBIN TIME: CPT

## 2025-02-24 PROCEDURE — 700117 HCHG RX CONTRAST REV CODE 255: Performed by: EMERGENCY MEDICINE

## 2025-02-24 PROCEDURE — 700105 HCHG RX REV CODE 258: Performed by: STUDENT IN AN ORGANIZED HEALTH CARE EDUCATION/TRAINING PROGRAM

## 2025-02-24 PROCEDURE — 700101 HCHG RX REV CODE 250: Performed by: STUDENT IN AN ORGANIZED HEALTH CARE EDUCATION/TRAINING PROGRAM

## 2025-02-24 PROCEDURE — 84145 PROCALCITONIN (PCT): CPT

## 2025-02-24 PROCEDURE — 83605 ASSAY OF LACTIC ACID: CPT

## 2025-02-24 PROCEDURE — 71045 X-RAY EXAM CHEST 1 VIEW: CPT

## 2025-02-24 PROCEDURE — 86850 RBC ANTIBODY SCREEN: CPT

## 2025-02-24 PROCEDURE — 96374 THER/PROPH/DIAG INJ IV PUSH: CPT

## 2025-02-24 PROCEDURE — 83735 ASSAY OF MAGNESIUM: CPT

## 2025-02-24 PROCEDURE — 700105 HCHG RX REV CODE 258

## 2025-02-24 PROCEDURE — 87077 CULTURE AEROBIC IDENTIFY: CPT

## 2025-02-24 PROCEDURE — 700111 HCHG RX REV CODE 636 W/ 250 OVERRIDE (IP): Performed by: STUDENT IN AN ORGANIZED HEALTH CARE EDUCATION/TRAINING PROGRAM

## 2025-02-24 PROCEDURE — 82962 GLUCOSE BLOOD TEST: CPT

## 2025-02-24 PROCEDURE — 82077 ASSAY SPEC XCP UR&BREATH IA: CPT

## 2025-02-24 PROCEDURE — 87040 BLOOD CULTURE FOR BACTERIA: CPT | Mod: 91

## 2025-02-24 PROCEDURE — 85027 COMPLETE CBC AUTOMATED: CPT

## 2025-02-24 PROCEDURE — 82533 TOTAL CORTISOL: CPT

## 2025-02-24 PROCEDURE — 81001 URINALYSIS AUTO W/SCOPE: CPT

## 2025-02-24 PROCEDURE — 84100 ASSAY OF PHOSPHORUS: CPT

## 2025-02-24 PROCEDURE — 85007 BL SMEAR W/DIFF WBC COUNT: CPT

## 2025-02-24 PROCEDURE — 0042T CT-CEREBRAL PERFUSION ANALYSIS: CPT

## 2025-02-24 PROCEDURE — 83690 ASSAY OF LIPASE: CPT

## 2025-02-24 PROCEDURE — 87449 NOS EACH ORGANISM AG IA: CPT

## 2025-02-24 PROCEDURE — 99285 EMERGENCY DEPT VISIT HI MDM: CPT

## 2025-02-24 PROCEDURE — 70498 CT ANGIOGRAPHY NECK: CPT

## 2025-02-24 PROCEDURE — 93005 ELECTROCARDIOGRAM TRACING: CPT | Mod: TC

## 2025-02-24 PROCEDURE — 80053 COMPREHEN METABOLIC PANEL: CPT

## 2025-02-24 PROCEDURE — 36415 COLL VENOUS BLD VENIPUNCTURE: CPT

## 2025-02-24 RX ORDER — PROCHLORPERAZINE EDISYLATE 5 MG/ML
5-10 INJECTION INTRAMUSCULAR; INTRAVENOUS EVERY 4 HOURS PRN
Status: DISCONTINUED | OUTPATIENT
Start: 2025-02-24 | End: 2025-02-26 | Stop reason: HOSPADM

## 2025-02-24 RX ORDER — AMOXICILLIN 250 MG
2 CAPSULE ORAL EVERY EVENING
Status: DISCONTINUED | OUTPATIENT
Start: 2025-02-24 | End: 2025-02-26 | Stop reason: HOSPADM

## 2025-02-24 RX ORDER — AZITHROMYCIN 500 MG/5ML
500 INJECTION, POWDER, LYOPHILIZED, FOR SOLUTION INTRAVENOUS EVERY 24 HOURS
Status: DISCONTINUED | OUTPATIENT
Start: 2025-02-25 | End: 2025-02-25

## 2025-02-24 RX ORDER — PROMETHAZINE HYDROCHLORIDE 25 MG/1
12.5-25 TABLET ORAL EVERY 4 HOURS PRN
Status: DISCONTINUED | OUTPATIENT
Start: 2025-02-24 | End: 2025-02-26 | Stop reason: HOSPADM

## 2025-02-24 RX ORDER — ACETAMINOPHEN 325 MG/1
650 TABLET ORAL EVERY 6 HOURS PRN
Status: DISCONTINUED | OUTPATIENT
Start: 2025-02-24 | End: 2025-02-26 | Stop reason: HOSPADM

## 2025-02-24 RX ORDER — SODIUM CHLORIDE, SODIUM LACTATE, POTASSIUM CHLORIDE, AND CALCIUM CHLORIDE .6; .31; .03; .02 G/100ML; G/100ML; G/100ML; G/100ML
500 INJECTION, SOLUTION INTRAVENOUS
Status: COMPLETED | OUTPATIENT
Start: 2025-02-24 | End: 2025-02-25

## 2025-02-24 RX ORDER — SUMATRIPTAN 6 MG/.5ML
6 INJECTION, SOLUTION SUBCUTANEOUS ONCE
Status: COMPLETED | OUTPATIENT
Start: 2025-02-24 | End: 2025-02-24

## 2025-02-24 RX ORDER — ONDANSETRON 4 MG/1
4 TABLET, ORALLY DISINTEGRATING ORAL EVERY 4 HOURS PRN
Status: DISCONTINUED | OUTPATIENT
Start: 2025-02-24 | End: 2025-02-26 | Stop reason: HOSPADM

## 2025-02-24 RX ORDER — MIDODRINE HYDROCHLORIDE 5 MG/1
10 TABLET ORAL
Status: DISCONTINUED | OUTPATIENT
Start: 2025-02-25 | End: 2025-02-26

## 2025-02-24 RX ORDER — LABETALOL HYDROCHLORIDE 5 MG/ML
10 INJECTION, SOLUTION INTRAVENOUS EVERY 4 HOURS PRN
Status: DISCONTINUED | OUTPATIENT
Start: 2025-02-24 | End: 2025-02-26 | Stop reason: HOSPADM

## 2025-02-24 RX ORDER — ONDANSETRON 2 MG/ML
4 INJECTION INTRAMUSCULAR; INTRAVENOUS EVERY 4 HOURS PRN
Status: DISCONTINUED | OUTPATIENT
Start: 2025-02-24 | End: 2025-02-26 | Stop reason: HOSPADM

## 2025-02-24 RX ORDER — MAGNESIUM SULFATE HEPTAHYDRATE 40 MG/ML
2 INJECTION, SOLUTION INTRAVENOUS ONCE
Status: COMPLETED | OUTPATIENT
Start: 2025-02-24 | End: 2025-02-24

## 2025-02-24 RX ORDER — SODIUM CHLORIDE 9 MG/ML
1000 INJECTION, SOLUTION INTRAVENOUS ONCE
Status: COMPLETED | OUTPATIENT
Start: 2025-02-24 | End: 2025-02-24

## 2025-02-24 RX ORDER — PANTOPRAZOLE SODIUM 40 MG/10ML
40 INJECTION, POWDER, LYOPHILIZED, FOR SOLUTION INTRAVENOUS 2 TIMES DAILY
Status: DISCONTINUED | OUTPATIENT
Start: 2025-02-24 | End: 2025-02-26

## 2025-02-24 RX ORDER — SODIUM CHLORIDE, SODIUM LACTATE, POTASSIUM CHLORIDE, CALCIUM CHLORIDE 600; 310; 30; 20 MG/100ML; MG/100ML; MG/100ML; MG/100ML
INJECTION, SOLUTION INTRAVENOUS CONTINUOUS
Status: DISCONTINUED | OUTPATIENT
Start: 2025-02-24 | End: 2025-02-26

## 2025-02-24 RX ORDER — POLYETHYLENE GLYCOL 3350 17 G/17G
1 POWDER, FOR SOLUTION ORAL
Status: DISCONTINUED | OUTPATIENT
Start: 2025-02-24 | End: 2025-02-26 | Stop reason: HOSPADM

## 2025-02-24 RX ORDER — CEFTRIAXONE 2 G/1
2000 INJECTION, POWDER, FOR SOLUTION INTRAMUSCULAR; INTRAVENOUS ONCE
Status: DISCONTINUED | OUTPATIENT
Start: 2025-02-24 | End: 2025-02-24

## 2025-02-24 RX ORDER — PROMETHAZINE HYDROCHLORIDE 25 MG/1
12.5-25 SUPPOSITORY RECTAL EVERY 4 HOURS PRN
Status: DISCONTINUED | OUTPATIENT
Start: 2025-02-24 | End: 2025-02-26 | Stop reason: HOSPADM

## 2025-02-24 RX ORDER — SODIUM CHLORIDE AND POTASSIUM CHLORIDE 300; 900 MG/100ML; MG/100ML
INJECTION, SOLUTION INTRAVENOUS CONTINUOUS
Status: DISPENSED | OUTPATIENT
Start: 2025-02-24 | End: 2025-02-25

## 2025-02-24 RX ORDER — PROCHLORPERAZINE EDISYLATE 5 MG/ML
10 INJECTION INTRAMUSCULAR; INTRAVENOUS ONCE
Status: COMPLETED | OUTPATIENT
Start: 2025-02-24 | End: 2025-02-24

## 2025-02-24 RX ORDER — CALCIUM GLUCONATE 20 MG/ML
1 INJECTION, SOLUTION INTRAVENOUS ONCE
Status: COMPLETED | OUTPATIENT
Start: 2025-02-24 | End: 2025-02-24

## 2025-02-24 RX ORDER — IBUPROFEN 200 MG
200 TABLET ORAL EVERY 6 HOURS PRN
Status: ON HOLD | COMMUNITY
End: 2025-02-26

## 2025-02-24 RX ADMIN — SODIUM CHLORIDE 1000 ML: 9 INJECTION, SOLUTION INTRAVENOUS at 12:54

## 2025-02-24 RX ADMIN — IOHEXOL 40 ML: 350 INJECTION, SOLUTION INTRAVENOUS at 12:49

## 2025-02-24 RX ADMIN — PROCHLORPERAZINE EDISYLATE 10 MG: 5 INJECTION INTRAMUSCULAR; INTRAVENOUS at 14:39

## 2025-02-24 RX ADMIN — SODIUM CHLORIDE 1000 ML: 9 INJECTION, SOLUTION INTRAVENOUS at 15:58

## 2025-02-24 RX ADMIN — POTASSIUM PHOSPHATE, MONOBASIC AND POTASSIUM PHOSPHATE, DIBASIC 30 MMOL: 224; 236 INJECTION, SOLUTION, CONCENTRATE INTRAVENOUS at 21:05

## 2025-02-24 RX ADMIN — CEFAZOLIN 2 G: 2 INJECTION, POWDER, FOR SOLUTION INTRAMUSCULAR; INTRAVENOUS at 18:53

## 2025-02-24 RX ADMIN — CALCIUM GLUCONATE 1 G: 20 INJECTION, SOLUTION INTRAVENOUS at 19:58

## 2025-02-24 RX ADMIN — IOHEXOL 100 ML: 350 INJECTION, SOLUTION INTRAVENOUS at 12:51

## 2025-02-24 RX ADMIN — SODIUM CHLORIDE, POTASSIUM CHLORIDE, SODIUM LACTATE AND CALCIUM CHLORIDE 950 ML: 600; 310; 30; 20 INJECTION, SOLUTION INTRAVENOUS at 20:51

## 2025-02-24 RX ADMIN — MAGNESIUM SULFATE HEPTAHYDRATE 2 G: 2 INJECTION, SOLUTION INTRAVENOUS at 20:57

## 2025-02-24 RX ADMIN — CEFAZOLIN 2 G: 2 INJECTION, POWDER, FOR SOLUTION INTRAMUSCULAR; INTRAVENOUS at 23:27

## 2025-02-24 RX ADMIN — SUMATRIPTAN 6 MG: 6 INJECTION, SOLUTION SUBCUTANEOUS at 14:39

## 2025-02-24 RX ADMIN — POTASSIUM CHLORIDE AND SODIUM CHLORIDE 980 ML: 900; 300 INJECTION, SOLUTION INTRAVENOUS at 19:57

## 2025-02-24 RX ADMIN — PANTOPRAZOLE SODIUM 40 MG: 40 INJECTION, POWDER, FOR SOLUTION INTRAVENOUS at 21:06

## 2025-02-24 ASSESSMENT — ENCOUNTER SYMPTOMS
FOCAL WEAKNESS: 0
DIZZINESS: 1
WEAKNESS: 1
HEADACHES: 1
HEARTBURN: 0
COUGH: 1
BLURRED VISION: 0
FEVER: 0
PALPITATIONS: 1
DOUBLE VISION: 0
ABDOMINAL PAIN: 0
DEPRESSION: 0
MYALGIAS: 1
SHORTNESS OF BREATH: 1
BRUISES/BLEEDS EASILY: 0
NAUSEA: 1
VOMITING: 0
CHILLS: 1

## 2025-02-24 ASSESSMENT — LIFESTYLE VARIABLES: SUBSTANCE_ABUSE: 0

## 2025-02-24 ASSESSMENT — PAIN DESCRIPTION - PAIN TYPE
TYPE: ACUTE PAIN
TYPE: ACUTE PAIN

## 2025-02-24 NOTE — ED PROVIDER NOTES
ER Provider Note    Scribed for Dmitriy Zhong D.O. by Hafsa Uriostegui. 2/24/2025  12:20 PM    Primary Care Provider: Pcp Pt States None    CHIEF COMPLAINT  Chief Complaint   Patient presents with    Unresponsive     BIB family due sudden onset of unresponsiveness 20-30 mins PTA.  Per family, recent delivery, no drugs, ETOH. Only takes ibuprofen.        HPI/ROS  LIMITATION TO HISTORY   Altered Mental Status     OUTSIDE HISTORIAN(S):  Patient's family provided the patient's history including the sequence of events and collateral information as seen below.     Shae Peguero is a 30 y.o. female who presents to the Emergency Department for altered mental status onset one hour ago. Patient's family member reports that the patient was complaining of a headache that began earlier today and were waiting in triage when she suddenly became unresponsive. Family notes that the patient was vomiting and had reported earlier that she was having numbness in her feet. Family reports that the patient does not take any daily medications and has no history of hypertension. The patient's family member states that she is one month post partum, adding that there were no complications with the birth. At bedside, the patient is complaining of a headache.     ROS as per HPI.    PAST MEDICAL HISTORY  Past Medical History:   Diagnosis Date    Urinary tract infection        SURGICAL HISTORY  Past Surgical History:   Procedure Laterality Date    DILATION AND CURETTAGE N/A 4/10/2024    Procedure: DILATION AND CURETTAGE;  Surgeon: Sharona Nice D.O.;  Location: [facility] DELIV[facility];  Service: Obstetrics       FAMILY HISTORY  Family History   Problem Relation Age of Onset    No Known Problems Mother     No Known Problems Father     No Known Problems Sister     No Known Problems Brother        SOCIAL HISTORY   reports that she has never smoked. She has never used smokeless tobacco. She reports that she does not drink alcohol and  "does not use drugs.    CURRENT MEDICATIONS  Current Discharge Medication List        CONTINUE these medications which have NOT CHANGED    Details   !! ibuprofen (MOTRIN) 200 MG Tab Take 200 mg by mouth every 6 hours as needed for Mild Pain or Fever.      acetaminophen (TYLENOL) 500 MG Tab Take 2 Tablets by mouth every 6 hours as needed for Mild Pain or Moderate Pain.  Qty: 120 Tablet, Refills: 2      !! ibuprofen (MOTRIN) 800 MG Tab Take 1 Tablet by mouth every 8 hours as needed for Mild Pain or Moderate Pain.  Qty: 120 Tablet, Refills: 2      ferrous sulfate 325 (65 Fe) MG tablet Take 1 Tablet by mouth every day.  Qty: 60 Tablet, Refills: 2      ascorbic acid (VITAMIN C) 500 MG tablet Take 1 Tablet by mouth every day.  Qty: 60 Tablet, Refills: 2       !! - Potential duplicate medications found. Please discuss with provider.          ALLERGIES  Patient has no known allergies.    PHYSICAL EXAM  /59   Pulse (!) 119   Resp (!) 22   Ht 1.676 m (5' 6\")   Wt 72.6 kg (160 lb)   SpO2 97%   BMI 25.82 kg/m²   General: No acute distress.  HENT: Normocephalic, Mucus membranes are moist.   Eyes: PERRLA  Chest: Lungs have even and unlabored respirations, Clear to auscultation.   Cardiovascular: Tachycardic with regular rhythm, No peripheral cyanosis.  Abdomen: Non distended.  Neuro: Awake, Altered to time and name, follows simple commands, small pronator drift to bilateral upper extremities, effort against gravity to bilateral lower extremities, sensation grossly intact   Psychiatric: Calm and cooperative.     INITIAL ASSESSMENT  12:20 PM - Called to the patient's room for evaluation. Initial assessment shows that the patient has an altered mental status and was complaining of a headache today. Patient is post partum of one month and does not have a history of hypertension or cerebral bleeds. The patient does not take any daily medications and family denies the use of alcohol or drugs. Patient has a headache with an " episode of altered mental status, no signs of seizure activity. Concerns for stroke vs cerebral hemorrhage. Patient was upgraded to stroke alter and initiated stat for CT scan.     Hydration: Based on the patient's presentation of Tachycardia the patient was given IV fluids. IV Hydration was used because oral hydration was not adequate alone. Upon recheck following hydration, the patient was improved.    4:10 PM - Patient was reevaluated at bedside. Discussed lab and radiology results with the patient. Patient is hypotensive and is receiving fluids. Patient shows no signs of infection and mild dehydration on their labs. Will repeat labs and continue fluids. Patient will be treated with 1000 mL of NS bolus. Ordered urine microscopic, BMP and Lactic acid to further evaluate the patient's condition.     ED Observation Status? No; Patient does not meet criteria for ED Observation.     DIAGNOSTIC STUDIES    Labs:   Results for orders placed or performed during the hospital encounter of 02/24/25   COD (ADULT)    Collection Time: 02/24/25 12:05 PM   Result Value Ref Range    ABO Grouping Only O     Rh Grouping Only POS     Antibody Screen-Cod NEG    CBC WITH DIFFERENTIAL    Collection Time: 02/24/25 12:10 PM   Result Value Ref Range    WBC 6.1 4.8 - 10.8 K/uL    RBC 5.72 (H) 4.20 - 5.40 M/uL    Hemoglobin 12.6 12.0 - 16.0 g/dL    Hematocrit 41.3 37.0 - 47.0 %    MCV 72.2 (L) 81.4 - 97.8 fL    MCH 22.0 (L) 27.0 - 33.0 pg    MCHC 30.5 (L) 32.2 - 35.5 g/dL    RDW 66.6 (H) 35.9 - 50.0 fL    Platelet Count 316 164 - 446 K/uL    MPV 9.7 9.0 - 12.9 fL    Neutrophils-Polys 76.00 (H) 44.00 - 72.00 %    Lymphocytes 14.10 (L) 22.00 - 41.00 %    Monocytes 0.00 0.00 - 13.40 %    Eosinophils 0.00 0.00 - 6.90 %    Basophils 0.80 0.00 - 1.80 %    Nucleated RBC 0.00 0.00 - 0.20 /100 WBC    Neutrophils (Absolute) 5.09 1.82 - 7.42 K/uL    Lymphs (Absolute) 0.86 (L) 1.00 - 4.80 K/uL    Monos (Absolute) 0.00 0.00 - 0.85 K/uL    Eos (Absolute)  0.00 0.00 - 0.51 K/uL    Baso (Absolute) 0.05 0.00 - 0.12 K/uL    NRBC (Absolute) 0.00 K/uL    Hypochromia 1+     Anisocytosis 1+     Microcytosis 1+    COMP METABOLIC PANEL    Collection Time: 02/24/25 12:10 PM   Result Value Ref Range    Sodium 136 135 - 145 mmol/L    Potassium 3.4 (L) 3.6 - 5.5 mmol/L    Chloride 102 96 - 112 mmol/L    Co2 13 (L) 20 - 33 mmol/L    Anion Gap 21.0 (H) 7.0 - 16.0    Glucose 116 (H) 65 - 99 mg/dL    Bun 14 8 - 22 mg/dL    Creatinine 1.20 0.50 - 1.40 mg/dL    Calcium 9.0 8.5 - 10.5 mg/dL    Correct Calcium 8.8 8.5 - 10.5 mg/dL    AST(SGOT) 41 12 - 45 U/L    ALT(SGPT) 18 2 - 50 U/L    Alkaline Phosphatase 151 (H) 30 - 99 U/L    Total Bilirubin 1.0 0.1 - 1.5 mg/dL    Albumin 4.3 3.2 - 4.9 g/dL    Total Protein 8.8 (H) 6.0 - 8.2 g/dL    Globulin 4.5 (H) 1.9 - 3.5 g/dL    A-G Ratio 1.0 g/dL   PROTHROMBIN TIME    Collection Time: 02/24/25 12:10 PM   Result Value Ref Range    PT 15.0 (H) 12.0 - 14.6 sec    INR 1.17 (H) 0.87 - 1.13   APTT    Collection Time: 02/24/25 12:10 PM   Result Value Ref Range    APTT 28.8 24.7 - 36.0 sec   TROPONIN    Collection Time: 02/24/25 12:10 PM   Result Value Ref Range    Troponin T 25 (H) 6 - 19 ng/L   HCG QUAL SERUM    Collection Time: 02/24/25 12:10 PM   Result Value Ref Range    Beta-Hcg Qualitative Serum Negative Negative   ESTIMATED GFR    Collection Time: 02/24/25 12:10 PM   Result Value Ref Range    GFR (CKD-EPI) 63 >60 mL/min/1.73 m 2   DIFFERENTIAL MANUAL    Collection Time: 02/24/25 12:10 PM   Result Value Ref Range    Bands-Stabs 7.50 0.00 - 10.00 %    Metamyelocytes 0.80 %    Myelocytes 0.80 %    Manual Diff Status PERFORMED    PERIPHERAL SMEAR REVIEW    Collection Time: 02/24/25 12:10 PM   Result Value Ref Range    Peripheral Smear Review see below    PLATELET ESTIMATE    Collection Time: 02/24/25 12:10 PM   Result Value Ref Range    Plt Estimation Normal    MORPHOLOGY    Collection Time: 02/24/25 12:10 PM   Result Value Ref Range    RBC  Morphology Present     Toxic Vacuoles Few    POCT glucose device results    Collection Time: 25 12:24 PM   Result Value Ref Range    POC Glucose, Blood 113 (H) 65 - 99 mg/dL   URINALYSIS CULTURE, IF INDICATED    Collection Time: 25  3:55 PM    Specimen: Urine, Clean Catch   Result Value Ref Range    Color Yellow     Character Clear     Specific Gravity >1.045 <1.035    Ph 6.5 5.0 - 8.0    Glucose Negative Negative mg/dL    Ketones Negative Negative mg/dL    Protein 30 (A) Negative mg/dL    Bilirubin Negative Negative    Urobilinogen, Urine 1.0 <=1.0 EU/dL    Nitrite Positive (A) Negative    Leukocyte Esterase Negative Negative    Occult Blood Large (A) Negative    Micro Urine Req Microscopic    URINE MICROSCOPIC (W/UA)    Collection Time: 25  3:55 PM   Result Value Ref Range    WBC 0-2 /hpf    RBC 0-2 0 - 2 /hpf    Bacteria Many (A) None /hpf    Epithelial Cells 0-2 0 - 5 /hpf    Urine Casts 0-2 0 - 2 /lpf   EKG    Collection Time: 25  4:02 PM   Result Value Ref Range    Report       [facility] Emergency Dept.    Test Date:  2025  Pt Name:    JYOTI KRISHNA               Department: ER  MRN:        [id number]                      Room:        21  Gender:     Female                       Technician:  :        [id number]                   Requested By:REENA TOTH  Order #:    [id number]                    Reading MD: REENA TOTH D.O.    Measurements  Intervals                                Axis  Rate:       118                          P:          15  ND:         135                          QRS:        82  QRSD:       80                           T:          23  QT:         293  QTc:        411    Interpretive Statements  Sinus tachycardia  Compared to ECG 2022 23:53:47  Sinus rhythm no longer present  Electronically Signed On 2025 16:02:28 PST by REENA TOTH D.O.     Basic Metabolic Panel    Collection Time: 25  4:16 PM    Result Value Ref Range    Sodium 139 135 - 145 mmol/L    Potassium 3.1 (L) 3.6 - 5.5 mmol/L    Chloride 114 (H) 96 - 112 mmol/L    Co2 14 (L) 20 - 33 mmol/L    Glucose 94 65 - 99 mg/dL    Bun 13 8 - 22 mg/dL    Creatinine 0.86 0.50 - 1.40 mg/dL    Calcium 7.4 (L) 8.5 - 10.5 mg/dL    Anion Gap 11.0 7.0 - 16.0   LACTIC ACID    Collection Time: 02/24/25  4:16 PM   Result Value Ref Range    Lactic Acid 1.7 0.5 - 2.0 mmol/L   ESTIMATED GFR    Collection Time: 02/24/25  4:16 PM   Result Value Ref Range    GFR (CKD-EPI) 94 >60 mL/min/1.73 m 2   DIAGNOSTIC ALCOHOL    Collection Time: 02/24/25  5:38 PM   Result Value Ref Range    Diagnostic Alcohol <10.1 <10.1 mg/dL   VENOUS BLOOD GAS    Collection Time: 02/24/25  5:38 PM   Result Value Ref Range    Venous Bg Ph 7.43 7.31 - 7.45    Venous Bg Ph Temp Corrected 7.42 7.31 - 7.45    Venous Bg Pco2 22.4 (L) 38.0 - 54.0 mmHg    Venous Bg Pco2 Temp Corrected 22.9 (L) 38.0 - 54.0 mmHg    Venous Bg Po2 62.3 (H) 23.0 - 48.0 mmHg    Venous Bg Po2 Temp Corrected 64.5 (H) 23.0 - 48.0 mmHg    Venous Bg O2 Saturation 91.3 (H) 60.0 - 85.0 %    Venous Bg Hco3 14 (L) 22 - 29 mmol/L    Venous Bg Base Excess -8 (L) -2 - 3 mmol/L    Body Temp 37.5 Centigrade   Prothrombin time (INR)    Collection Time: 02/24/25  5:38 PM   Result Value Ref Range    PT 16.4 (H) 12.0 - 14.6 sec    INR 1.32 (H) 0.87 - 1.13   URINE DRUG SCREEN    Collection Time: 02/24/25  5:49 PM   Result Value Ref Range    Amphetamines Urine Negative Negative    Barbiturates Negative Negative    Benzodiazepines Negative Negative    Cocaine Metabolite Negative Negative    Fentanyl, Urine Negative Negative    Methadone Negative Negative    Opiates Negative Negative    Oxycodone Negative Negative    Phencyclidine -Pcp Negative Negative    Propoxyphene Negative Negative    Cannabinoid Metab Negative Negative        EKG:   I have independently interpreted the above EKG.    Radiology:   The attending emergency physician has  independently interpreted the diagnostic imaging associated with this visit and am waiting the final reading from the radiologist.   Preliminary interpretation is as follows: No bleed  Radiologist interpretation:   DX-CHEST-PORTABLE (1 VIEW)   Final Result      No acute cardiopulmonary disease.      CT-CTA NECK WITH & W/O-POST PROCESSING   Final Result      1.  No focal high-grade stenosis, dissection or occlusion of the cervical carotid or vertebral arteries.   2.  Dental disease.   3.  Cystic lesions in the RIGHT tonsil measuring 8 mm each, of uncertain significance.      CT-CTA HEAD WITH & W/O-POST PROCESS   Final Result      1.  No intracranial aneurysm, focal high-grade stenosis, or abrupt vessel cut off.   2.  No gross intracranial hemorrhage, however presence of intravenous contrast may exclude small amounts of subarachnoid hemorrhage.      CT-CEREBRAL PERFUSION ANALYSIS   Final Result      1. Cerebral blood flow less than 30% possibly representing completed infarct = 0 mL. Based on distribution of this finding, this is unlikely to represent artifact.      2. T Max more than 6 seconds possibly representing combination of completed infarct and ischemia = 0 mL. Based on the distribution of this finding, this is unlikely to represent artifact.      3. Mismatched volume possibly representing ischemic brain/penumbra= 0 mL      4.  Please note that this cerebral perfusion study and report is Quantitative and targets supratentorial (cerebral) perfusion for evaluation of large vessel territory acute ischemia/infarction. For example, lacunar infarcts, and brainstem/posterior fossa    ischemia/infarction are not evaluated on this study.  Data acquisition is subject to artifacts which can yield non-anatomically plausible perfusion maps which may be due to motion, bolus timing, signal to noise ratio, or other technical factors.    Perfusion map abnormalities which show non-anatomic distributions are likely artifact.    "This study is not \"stand-alone\" and should only be utilized for diagnosis, management/treatment in correlation with CT, CTA, and/or MRI and clinical factors.             COURSE & MEDICAL DECISION MAKING     COURSE AND PLAN  12:20 PM - Patient seen and examined at bedside. Discussed the plan of care with the patient including ordering labs and radiology to further evaluate the patient's condition. The patient was able to ask questions at this time. Patient agrees to the plan of care. The patient will be resuscitated with 1L NS IV. Ordered for Chest X-ray, CT-cerebral perfusion analysis, CT-CTA head with and without post process, CT-CTA neck with and without post process, CBC w/ diff, CMP, Prothrombin time, APTT, COD, Troponin, UA and EKG to evaluate her symptoms.     1:46 PM - Reviewed the patient's diagnostic results.     2:09 PM - Patient was reevaluated at bedside. Patient was able to move all extremities and shows equal strength bilaterally. Patient is complaining of a headache at this time. Discussed lab and radiology results with the patient where the CT scan showed no signs of a bleed. Will treat the patient for a headache. Patient will be treated with Imitrex 6 mg and Compazine 10 mg.     3:41 PM - Patient was treated with NS bolus 1000 mg.     4:00 PM - Patient was reevaluated at bedside. Patient is more awake and responsive. Patient's neurology exam is normal and she reports that her headache has resolved. Ordered Lactic acid, BMP and eGFR to further evaluate the patient's condition.     5:12 PM - Reviewed repeat lab results and will have the patient admitted to the hospital due to their current condition. [facility] Hospitalist.     5:24 PM - I discussed the patient's case and the above findings with Dr. Young (Hospitalist) who agrees to admit the patient to the hospital.     5:27 PM - Patient was reevaluated at bedside. Informed the patient about my discussion with Dr. Young and that they will be admitted to " the hospital. Patient was able to ask questions at this time and agrees to the plan of admission.     ED Summary: Patient was having severe headache this morning, and then she had a period of unconsciousness.  On arrival she was arousable but very somnolent and delirious, she did not know who she was and where she was and she had generalized weakness nothing lateralizing.  Due to the sudden acute onset there was concerns for possible CVA versus bleed so she is upgraded to a stroke assessment and was brought straight to CT scan.    CT scan shows no bleed.  She was medicated with Compazine and Imitrex for the headache which did improve her headache but she did have hypotension while here.  2 L of IV fluids are given her systolic pressures in the 90s.  There was concerns for possible sepsis.  Her lactic acid level is normal she does have an anion gap acidosis on arrival and on repeat after fluids she still remains acidotic with a CO2 of 14.  She does have a UTI questionable sepsis infection, I do not know the cause of her mental status change but that did resolve with observation here.    I spoke with the hospitalist for admission the patient will be admitted for further evaluation and treatment.    HYDRATION: Based on the patient's presentation of Hypotension the patient was given IV fluids. IV Hydration was used because oral hydration was not adequate alone. Upon recheck following hydration, the patient was improved.    CRITICAL CARE  The very real possibility of a deterioration of this patient's condition required the highest level of my preparedness for sudden, emergent intervention.  I provided critical care services, which included medication orders, frequent reevaluations of the patient's condition and response to treatment, ordering and reviewing test results, and discussing the case with various consultants.  The critical care time associated with the care of the patient was 35 minutes. Review chart for  interventions. This time is exclusive of any other billable procedures.       DISPOSITION AND DISCUSSIONS  I have discussed management of the patient with the following physicians and MINGO's: Dr. Young (Hospitalist).     Discussion of management with other [facility] or appropriate source(s): None    Barriers to care at this time, including but not limited to: Patient does not have a PCP.      The patient will return for new or worsening symptoms and is stable at the time of discharge.    DISPOSITION:  Patient will be hospitalized by Dr. Young (Hospitalist) in guarded condition.    FINAL DIAGNOSIS  1. Delirium    2. Severe headache    3. Hypokalemia    4. Metabolic acidosis      Critical Care Time of 35 minutes was administered to the patient, see note above.     IHafsa (Scribe), am scribing for, and in the presence of, Dmitriy Zhong D.O..    Electronically signed by: Hafsa Uriostegui (Scribe), 2/24/2025    IDmitriy D.O. personally performed the services described in this documentation, as scribed by Hafsa Uriostegui in my presence, and it is both accurate and complete.     The note accurately reflects work and decisions made by me.  Dmitriy Zhong D.O.  2/24/2025  7:20 PM

## 2025-02-24 NOTE — ED TRIAGE NOTES
Shae Peguero  30 y.o. female  Chief Complaint   Patient presents with    Unresponsive     BIB family due sudden onset of unresponsiveness 20-30 mins PTA.  Per family, recent delivery, no drugs, ETOH. Only takes ibuprofen.

## 2025-02-24 NOTE — ED NOTES
Medication history reviewed with patients family at bedside.   Med rec is complete  Allergies reviewed.     Patient has not had any outpatient antibiotics in the last 30 days.   Anticoagulants: No    Alban Zamora

## 2025-02-24 NOTE — DISCHARGE PLANNING
SW responded to critical patient alert, pts family were with her at bedside:  Matias Singh and Michela Epperson (Auntie) [phone]

## 2025-02-25 ENCOUNTER — APPOINTMENT (OUTPATIENT)
Dept: RADIOLOGY | Facility: MEDICAL CENTER | Age: 30
End: 2025-02-25
Attending: STUDENT IN AN ORGANIZED HEALTH CARE EDUCATION/TRAINING PROGRAM
Payer: MEDICAID

## 2025-02-25 ENCOUNTER — APPOINTMENT (OUTPATIENT)
Dept: RADIOLOGY | Facility: MEDICAL CENTER | Age: 30
End: 2025-02-25
Attending: INTERNAL MEDICINE
Payer: MEDICAID

## 2025-02-25 PROBLEM — J18.9 PNEUMONIA DUE TO INFECTIOUS ORGANISM: Status: RESOLVED | Noted: 2025-02-24 | Resolved: 2025-02-25

## 2025-02-25 PROBLEM — N39.0 ACUTE UTI: Status: RESOLVED | Noted: 2025-02-24 | Resolved: 2025-02-25

## 2025-02-25 PROBLEM — N13.30 HYDRONEPHROSIS: Status: ACTIVE | Noted: 2025-02-25

## 2025-02-25 LAB
ALBUMIN SERPL BCP-MCNC: 2.9 G/DL (ref 3.2–4.9)
ALBUMIN/GLOB SERPL: 0.9 G/DL
ALP SERPL-CCNC: 85 U/L (ref 30–99)
ALT SERPL-CCNC: 16 U/L (ref 2–50)
ANION GAP SERPL CALC-SCNC: 13 MMOL/L (ref 7–16)
ANISOCYTOSIS BLD QL SMEAR: ABNORMAL
AST SERPL-CCNC: 32 U/L (ref 12–45)
BACTERIA BLD CULT: NORMAL
BACTERIA BLD CULT: NORMAL
BASOPHILS # BLD AUTO: 0.5 % (ref 0–1.8)
BASOPHILS # BLD: 0.06 K/UL (ref 0–0.12)
BILIRUB SERPL-MCNC: 0.3 MG/DL (ref 0.1–1.5)
BUN SERPL-MCNC: 10 MG/DL (ref 8–22)
CALCIUM ALBUM COR SERPL-MCNC: 8.3 MG/DL (ref 8.5–10.5)
CALCIUM SERPL-MCNC: 7.4 MG/DL (ref 8.5–10.5)
CHLORIDE SERPL-SCNC: 112 MMOL/L (ref 96–112)
CO2 SERPL-SCNC: 14 MMOL/L (ref 20–33)
COMMENT 1642: NORMAL
CREAT SERPL-MCNC: 0.8 MG/DL (ref 0.5–1.4)
EOSINOPHIL # BLD AUTO: 0.48 K/UL (ref 0–0.51)
EOSINOPHIL NFR BLD: 3.8 % (ref 0–6.9)
ERYTHROCYTE [DISTWIDTH] IN BLOOD BY AUTOMATED COUNT: 67.9 FL (ref 35.9–50)
FERRITIN SERPL-MCNC: 103 NG/ML (ref 10–291)
GFR SERPLBLD CREATININE-BSD FMLA CKD-EPI: 103 ML/MIN/1.73 M 2
GLOBULIN SER CALC-MCNC: 3.1 G/DL (ref 1.9–3.5)
GLUCOSE SERPL-MCNC: 118 MG/DL (ref 65–99)
HCT VFR BLD AUTO: 31.4 % (ref 37–47)
HGB BLD-MCNC: 9.8 G/DL (ref 12–16)
HIV 1+2 AB+HIV1 P24 AG SERPL QL IA: NORMAL
HYPOCHROMIA BLD QL SMEAR: ABNORMAL
IMM GRANULOCYTES # BLD AUTO: 0.08 K/UL (ref 0–0.11)
IMM GRANULOCYTES NFR BLD AUTO: 0.6 % (ref 0–0.9)
IRON SATN MFR SERPL: 4 % (ref 15–55)
IRON SERPL-MCNC: 13 UG/DL (ref 40–170)
LACTATE SERPL-SCNC: 0.8 MMOL/L (ref 0.5–2)
LYMPHOCYTES # BLD AUTO: 1.26 K/UL (ref 1–4.8)
LYMPHOCYTES NFR BLD: 10 % (ref 22–41)
MAGNESIUM SERPL-MCNC: 2.5 MG/DL (ref 1.5–2.5)
MCH RBC QN AUTO: 22.8 PG (ref 27–33)
MCHC RBC AUTO-ENTMCNC: 31.2 G/DL (ref 32.2–35.5)
MCV RBC AUTO: 73 FL (ref 81.4–97.8)
MICROCYTES BLD QL SMEAR: ABNORMAL
MONOCYTES # BLD AUTO: 0.28 K/UL (ref 0–0.85)
MONOCYTES NFR BLD AUTO: 2.2 % (ref 0–13.4)
MORPHOLOGY BLD-IMP: NORMAL
NEUTROPHILS # BLD AUTO: 10.41 K/UL (ref 1.82–7.42)
NEUTROPHILS NFR BLD: 82.9 % (ref 44–72)
NRBC # BLD AUTO: 0 K/UL
NRBC BLD-RTO: 0 /100 WBC (ref 0–0.2)
OVALOCYTES BLD QL SMEAR: NORMAL
PHOSPHATE SERPL-MCNC: 4.4 MG/DL (ref 2.5–4.5)
PLATELET # BLD AUTO: 224 K/UL (ref 164–446)
PMV BLD AUTO: 9.4 FL (ref 9–12.9)
POIKILOCYTOSIS BLD QL SMEAR: NORMAL
POTASSIUM SERPL-SCNC: 4.5 MMOL/L (ref 3.6–5.5)
PROT SERPL-MCNC: 6 G/DL (ref 6–8.2)
RBC # BLD AUTO: 4.3 M/UL (ref 4.2–5.4)
RBC BLD AUTO: PRESENT
SCHISTOCYTES BLD QL SMEAR: NORMAL
SIGNIFICANT IND 70042: NORMAL
SIGNIFICANT IND 70042: NORMAL
SITE SITE: NORMAL
SITE SITE: NORMAL
SODIUM SERPL-SCNC: 139 MMOL/L (ref 135–145)
SOURCE SOURCE: NORMAL
SOURCE SOURCE: NORMAL
T PALLIDUM AB SER QL IA: NORMAL
TIBC SERPL-MCNC: 289 UG/DL (ref 250–450)
UIBC SERPL-MCNC: 276 UG/DL (ref 110–370)
WBC # BLD AUTO: 12.6 K/UL (ref 4.8–10.8)

## 2025-02-25 PROCEDURE — 83540 ASSAY OF IRON: CPT

## 2025-02-25 PROCEDURE — 87389 HIV-1 AG W/HIV-1&-2 AB AG IA: CPT

## 2025-02-25 PROCEDURE — 83605 ASSAY OF LACTIC ACID: CPT

## 2025-02-25 PROCEDURE — 83735 ASSAY OF MAGNESIUM: CPT

## 2025-02-25 PROCEDURE — 700117 HCHG RX CONTRAST REV CODE 255: Performed by: INTERNAL MEDICINE

## 2025-02-25 PROCEDURE — 74177 CT ABD & PELVIS W/CONTRAST: CPT

## 2025-02-25 PROCEDURE — 93970 EXTREMITY STUDY: CPT

## 2025-02-25 PROCEDURE — 84100 ASSAY OF PHOSPHORUS: CPT

## 2025-02-25 PROCEDURE — 93970 EXTREMITY STUDY: CPT | Mod: 26 | Performed by: INTERNAL MEDICINE

## 2025-02-25 PROCEDURE — 700102 HCHG RX REV CODE 250 W/ 637 OVERRIDE(OP): Performed by: STUDENT IN AN ORGANIZED HEALTH CARE EDUCATION/TRAINING PROGRAM

## 2025-02-25 PROCEDURE — 83550 IRON BINDING TEST: CPT

## 2025-02-25 PROCEDURE — 99233 SBSQ HOSP IP/OBS HIGH 50: CPT | Performed by: INTERNAL MEDICINE

## 2025-02-25 PROCEDURE — 700111 HCHG RX REV CODE 636 W/ 250 OVERRIDE (IP): Mod: JZ | Performed by: STUDENT IN AN ORGANIZED HEALTH CARE EDUCATION/TRAINING PROGRAM

## 2025-02-25 PROCEDURE — 700101 HCHG RX REV CODE 250: Performed by: STUDENT IN AN ORGANIZED HEALTH CARE EDUCATION/TRAINING PROGRAM

## 2025-02-25 PROCEDURE — 87040 BLOOD CULTURE FOR BACTERIA: CPT

## 2025-02-25 PROCEDURE — 770020 HCHG ROOM/CARE - TELE (206)

## 2025-02-25 PROCEDURE — 86780 TREPONEMA PALLIDUM: CPT

## 2025-02-25 PROCEDURE — 80053 COMPREHEN METABOLIC PANEL: CPT

## 2025-02-25 PROCEDURE — 700111 HCHG RX REV CODE 636 W/ 250 OVERRIDE (IP): Mod: JZ | Performed by: INTERNAL MEDICINE

## 2025-02-25 PROCEDURE — 51798 US URINE CAPACITY MEASURE: CPT

## 2025-02-25 PROCEDURE — 86738 MYCOPLASMA ANTIBODY: CPT

## 2025-02-25 PROCEDURE — 700105 HCHG RX REV CODE 258: Performed by: INTERNAL MEDICINE

## 2025-02-25 PROCEDURE — 82728 ASSAY OF FERRITIN: CPT

## 2025-02-25 PROCEDURE — 85025 COMPLETE CBC W/AUTO DIFF WBC: CPT

## 2025-02-25 PROCEDURE — 76775 US EXAM ABDO BACK WALL LIM: CPT

## 2025-02-25 PROCEDURE — A9270 NON-COVERED ITEM OR SERVICE: HCPCS | Performed by: STUDENT IN AN ORGANIZED HEALTH CARE EDUCATION/TRAINING PROGRAM

## 2025-02-25 PROCEDURE — 700105 HCHG RX REV CODE 258: Performed by: STUDENT IN AN ORGANIZED HEALTH CARE EDUCATION/TRAINING PROGRAM

## 2025-02-25 RX ADMIN — AZITHROMYCIN DIHYDRATE 500 MG: 500 INJECTION, POWDER, LYOPHILIZED, FOR SOLUTION INTRAVENOUS at 06:28

## 2025-02-25 RX ADMIN — MIDODRINE HYDROCHLORIDE 10 MG: 5 TABLET ORAL at 11:37

## 2025-02-25 RX ADMIN — PANTOPRAZOLE SODIUM 40 MG: 40 INJECTION, POWDER, FOR SOLUTION INTRAVENOUS at 06:25

## 2025-02-25 RX ADMIN — PANTOPRAZOLE SODIUM 40 MG: 40 INJECTION, POWDER, FOR SOLUTION INTRAVENOUS at 17:38

## 2025-02-25 RX ADMIN — AMPICILLIN AND SULBACTAM 3 G: 1; 2 INJECTION, POWDER, FOR SOLUTION INTRAMUSCULAR; INTRAVENOUS at 00:54

## 2025-02-25 RX ADMIN — MIDODRINE HYDROCHLORIDE 10 MG: 5 TABLET ORAL at 08:04

## 2025-02-25 RX ADMIN — SODIUM CHLORIDE, POTASSIUM CHLORIDE, SODIUM LACTATE AND CALCIUM CHLORIDE 980 ML: 600; 310; 30; 20 INJECTION, SOLUTION INTRAVENOUS at 01:09

## 2025-02-25 RX ADMIN — SENNOSIDES AND DOCUSATE SODIUM 2 TABLET: 50; 8.6 TABLET ORAL at 17:39

## 2025-02-25 RX ADMIN — SODIUM CHLORIDE, POTASSIUM CHLORIDE, SODIUM LACTATE AND CALCIUM CHLORIDE 500 ML: 600; 310; 30; 20 INJECTION, SOLUTION INTRAVENOUS at 00:06

## 2025-02-25 RX ADMIN — MIDODRINE HYDROCHLORIDE 10 MG: 5 TABLET ORAL at 00:43

## 2025-02-25 RX ADMIN — MIDODRINE HYDROCHLORIDE 10 MG: 5 TABLET ORAL at 17:36

## 2025-02-25 RX ADMIN — AMPICILLIN AND SULBACTAM 3 G: 1; 2 INJECTION, POWDER, FOR SOLUTION INTRAMUSCULAR; INTRAVENOUS at 06:20

## 2025-02-25 RX ADMIN — AMPICILLIN AND SULBACTAM 3 G: 1; 2 INJECTION, POWDER, FOR SOLUTION INTRAMUSCULAR; INTRAVENOUS at 11:37

## 2025-02-25 RX ADMIN — SODIUM CHLORIDE 250 MG: 9 INJECTION, SOLUTION INTRAVENOUS at 18:00

## 2025-02-25 RX ADMIN — IOHEXOL 95 ML: 350 INJECTION, SOLUTION INTRAVENOUS at 09:15

## 2025-02-25 ASSESSMENT — COGNITIVE AND FUNCTIONAL STATUS - GENERAL
DAILY ACTIVITIY SCORE: 21
WALKING IN HOSPITAL ROOM: A LITTLE
MOBILITY SCORE: 21
STANDING UP FROM CHAIR USING ARMS: A LITTLE
HELP NEEDED FOR BATHING: A LITTLE
SUGGESTED CMS G CODE MODIFIER MOBILITY: CJ
TOILETING: A LITTLE
CLIMB 3 TO 5 STEPS WITH RAILING: A LITTLE
SUGGESTED CMS G CODE MODIFIER DAILY ACTIVITY: CJ
DRESSING REGULAR UPPER BODY CLOTHING: A LITTLE

## 2025-02-25 ASSESSMENT — SOCIAL DETERMINANTS OF HEALTH (SDOH)
WITHIN THE LAST YEAR, HAVE YOU BEEN KICKED, HIT, SLAPPED, OR OTHERWISE PHYSICALLY HURT BY YOUR PARTNER OR EX-PARTNER?: NO
WITHIN THE LAST YEAR, HAVE TO BEEN RAPED OR FORCED TO HAVE ANY KIND OF SEXUAL ACTIVITY BY YOUR PARTNER OR EX-PARTNER?: NO
WITHIN THE LAST YEAR, HAVE YOU BEEN AFRAID OF YOUR PARTNER OR EX-PARTNER?: NO
IN THE PAST 12 MONTHS, HAS THE ELECTRIC, GAS, OIL, OR WATER COMPANY THREATENED TO SHUT OFF SERVICE IN YOUR HOME?: NO
WITHIN THE LAST YEAR, HAVE YOU BEEN HUMILIATED OR EMOTIONALLY ABUSED IN OTHER WAYS BY YOUR PARTNER OR EX-PARTNER?: NO
WITHIN THE PAST 12 MONTHS, YOU WORRIED THAT YOUR FOOD WOULD RUN OUT BEFORE YOU GOT THE MONEY TO BUY MORE: NEVER TRUE
WITHIN THE PAST 12 MONTHS, THE FOOD YOU BOUGHT JUST DIDN'T LAST AND YOU DIDN'T HAVE MONEY TO GET MORE: NEVER TRUE

## 2025-02-25 ASSESSMENT — ENCOUNTER SYMPTOMS
ORTHOPNEA: 0
DIARRHEA: 0
ABDOMINAL PAIN: 0
COUGH: 0
SPEECH CHANGE: 0
DIZZINESS: 0
VOMITING: 0
CONSTIPATION: 0
HEADACHES: 1
WEAKNESS: 0
DOUBLE VISION: 0
CLAUDICATION: 0
FEVER: 0
HEMOPTYSIS: 0
BLURRED VISION: 0
FLANK PAIN: 0
MYALGIAS: 0
WEIGHT LOSS: 0
NECK PAIN: 0
NAUSEA: 0
PHOTOPHOBIA: 0
CHILLS: 0
PALPITATIONS: 0

## 2025-02-25 ASSESSMENT — PAIN DESCRIPTION - PAIN TYPE
TYPE: ACUTE PAIN

## 2025-02-25 ASSESSMENT — LIFESTYLE VARIABLES
CONSUMPTION TOTAL: NEGATIVE
EVER HAD A DRINK FIRST THING IN THE MORNING TO STEADY YOUR NERVES TO GET RID OF A HANGOVER: NO
HAVE PEOPLE ANNOYED YOU BY CRITICIZING YOUR DRINKING: NO
EVER FELT BAD OR GUILTY ABOUT YOUR DRINKING: NO
ON A TYPICAL DAY WHEN YOU DRINK ALCOHOL HOW MANY DRINKS DO YOU HAVE: 0
TOTAL SCORE: 0
HAVE YOU EVER FELT YOU SHOULD CUT DOWN ON YOUR DRINKING: NO
TOTAL SCORE: 0
DOES PATIENT WANT TO STOP DRINKING: NO
AVERAGE NUMBER OF DAYS PER WEEK YOU HAVE A DRINK CONTAINING ALCOHOL: 0
HOW MANY TIMES IN THE PAST YEAR HAVE YOU HAD 5 OR MORE DRINKS IN A DAY: 0
ALCOHOL_USE: NO
TOTAL SCORE: 0

## 2025-02-25 ASSESSMENT — FIBROSIS 4 INDEX: FIB4 SCORE: 1

## 2025-02-25 NOTE — ASSESSMENT & PLAN NOTE
This is Sepsis Present on admission  SIRS criteria identified on my evaluation include: Tachycardia, with heart rate greater than 90 BPM, Tachypnea, with respirations greater than 20 per minute, and Bandemia, greater than 10% bands  Clinical indicators of end organ dysfunction include Hypotension with systolic blood pressure less than 90 or MAP less than 65  Source is /pulm  Sepsis protocol initiated  Crystalloid Fluid Administration: Fluid resuscitation ordered per standard protocol - 30 mL/kg per current or ideal body weight  IV antibiotics as appropriate for source of sepsis  Reassessment: I have reassessed the patient's hemodynamic status    Elevated procalcitonin with no signs of infection  IV fluid and waiting for blood culture  At this time continue ceftriaxone

## 2025-02-25 NOTE — CARE PLAN
The patient is Watcher - Medium risk of patient condition declining or worsening    Shift Goals  Clinical Goals: [place] to unit, VSS, comfort  Patient Goals: POC  Family Goals: POC    Progress made toward(s) clinical / shift goals:    Problem: Knowledge Deficit - Standard  Goal: Patient and family/care givers will demonstrate understanding of plan of care, disease process/condition, diagnostic tests and medications  Outcome: Progressing  Note: Discussed plan of care, pt able to actively participate in the learning process and demonstrates understanding by return demonstration or return explanation. Improved ability to communicate regarding their healthcare and current admission. Improved ability to identify barriers to learning and care.       Problem: Hemodynamics  Goal: Patient's hemodynamics, fluid balance and neurologic status will be stable or improve  Note: Vital signs stable, CMS intact, signs of bleeding assessed. I/O monitored. IV fluids infusing. Oral intake adequate. Peripheral pulses assessed and monitored. Capillary refill assessed. PRN blood pressure control meds given as needed. Pt hypotensive with soft BPs, ordered followed and Hospitalist informed.             Patient is not progressing towards the following goals:

## 2025-02-25 NOTE — ED NOTES
T8 RN @ bedside to take pt to floor. NAD. VSS. Respirations equal and unlabored. All belongings with pt.

## 2025-02-25 NOTE — PROGRESS NOTES
RN notified by phone of critical result: Procal 21.1. Pt assessed and found to be asymptomatic. Hospitalist notified. No new orders at this time.

## 2025-02-25 NOTE — PROGRESS NOTES
Bedside report received from day shift RN  DMITRY Roman. Pt is currently A&Ox4, SR, breathing at a normal rate and rhythm, warm, dry, and skin color appropriate for ethnicity, and in no visible emotional or physical distress. Bed locked in lowest position, call light is within reach, fall prevention measures in place. Pt educated to use call light before getting out of bed, pt verbalized understanding. Pt is on a cardiac monitor, order verified, transmitter connected appropriately, and leads placed correctly, rhythm and rate assessed by RN, monitor staff updated of changes and parameters as necessary.  No further needs at this time. Assumed care of pt. Report given to assisting staff - CNA/CCT/ACT    Fall Risk Score: MODERATE RISK  Fall risk interventions in place: Provide patient/family education based on risk assessment, Educate patient/family to call staff for assistance when getting out of bed, Place fall precaution signage outside patient door, Place patient in room close to nursing station, Utilize bed/chair fall alarm, and Bed alarm connected correctly  Bed type: Regular (David Score less than 17 interventions in place)  Patient on cardiac monitor: Yes  IVF/IV medications: Infusion per MAR (List Med(s)) NaCl  Oxygen: Room Air  Bedside sitter: Not Applicable   Isolation: Not applicable

## 2025-02-25 NOTE — H&P
Hospital Medicine History & Physical Note    Date of Service  2/24/2025    Primary Care Physician  Pcp Pt States None    Consultants  None    Code Status  Full Code    Chief Complaint  Chief Complaint   Patient presents with    Unresponsive     BIB family due sudden onset of unresponsiveness 20-30 mins PTA.  Per family, recent delivery, no drugs, ETOH. Only takes ibuprofen.        History of Presenting Illness  Shae Peguero is a 30 y.o. female with no PMH who presented 2/24/2025 with evaluation for lateral responsiveness.  She denies illicit drug use, tobacco smoking, EtOH use.  She does reported taking ibuprofen for headache.  In ER, no acute findings on CTA head and neck or perfusion study.  She does have high anion gap metabolic acidosis, SOREN.  She received IVF bolus, still having metabolic acidosis.  Therefore, admission requested by ERP.  Admitted to medicine service for further evaluation and treatment.    I discussed the plan of care with patient, family, bedside RN, charge RN, and pharmacy.    Review of Systems  Review of Systems   Constitutional:  Positive for chills and malaise/fatigue. Negative for fever.   HENT:  Negative for hearing loss and tinnitus.    Eyes:  Negative for blurred vision and double vision.   Respiratory:  Positive for cough and shortness of breath.    Cardiovascular:  Positive for palpitations. Negative for chest pain.   Gastrointestinal:  Positive for nausea. Negative for abdominal pain, heartburn and vomiting.   Genitourinary:  Negative for dysuria and urgency.   Musculoskeletal:  Positive for myalgias. Negative for joint pain.   Skin:  Negative for itching and rash.   Neurological:  Positive for dizziness, weakness and headaches. Negative for focal weakness.   Endo/Heme/Allergies:  Negative for environmental allergies. Does not bruise/bleed easily.   Psychiatric/Behavioral:  Negative for depression and substance abuse.    All other systems reviewed and are  negative.      Past Medical History   has a past medical history of Urinary tract infection.    Surgical History   has a past surgical history that includes dilation and curettage (N/A, 4/10/2024).     Family History  family history includes No Known Problems in her brother, father, mother, and sister.   Family history reviewed with patient. There is no family history that is pertinent to the chief complaint.     Social History   reports that she has never smoked. She has never used smokeless tobacco. She reports that she does not drink alcohol and does not use drugs.    Allergies  No Known Allergies    Medications  Prior to Admission Medications   Prescriptions Last Dose Informant Patient Reported? Taking?   acetaminophen (TYLENOL) 500 MG Tab Not Taking Significant Other No No   Sig: Take 2 Tablets by mouth every 6 hours as needed for Mild Pain or Moderate Pain.   Patient not taking: Reported on 2/24/2025   ascorbic acid (VITAMIN C) 500 MG tablet Not Taking Significant Other No No   Sig: Take 1 Tablet by mouth every day.   Patient not taking: Reported on 2/24/2025   ferrous sulfate 325 (65 Fe) MG tablet Not Taking Significant Other No No   Sig: Take 1 Tablet by mouth every day.   Patient not taking: Reported on 2/24/2025   ibuprofen (MOTRIN) 200 MG Tab 2/24/2025 Morning Significant Other Yes Yes   Sig: Take 200 mg by mouth every 6 hours as needed for Mild Pain or Fever.   ibuprofen (MOTRIN) 800 MG Tab Not Taking Significant Other No No   Sig: Take 1 Tablet by mouth every 8 hours as needed for Mild Pain or Moderate Pain.   Patient not taking: Reported on 2/24/2025      Facility-Administered Medications: None       Physical Exam  Temp:  [36.8 °C (98.2 °F)-37.3 °C (99.1 °F)] 37.1 °C (98.8 °F)  Pulse:  [] 82  Resp:  [13-22] 18  BP: ()/(47-59) 97/57  SpO2:  [96 %-99 %] 98 %  Blood Pressure: 92/53   Temperature: 36.8 °C (98.2 °F)   Pulse: 94   Respiration: 18   Pulse Oximetry: 98 %       Physical Exam  Vitals  "and nursing note reviewed.   Constitutional:       General: She is not in acute distress.  HENT:      Head: Normocephalic and atraumatic.      Nose: Nose normal.      Mouth/Throat:      Mouth: Mucous membranes are dry.      Pharynx: Oropharynx is clear.   Eyes:      General: No scleral icterus.     Extraocular Movements: Extraocular movements intact.   Cardiovascular:      Rate and Rhythm: Regular rhythm. Tachycardia present.      Pulses: Normal pulses.      Heart sounds:      No friction rub.   Pulmonary:      Effort: No respiratory distress.      Breath sounds: No stridor. Rales present. No wheezing.   Chest:      Chest wall: No tenderness.   Abdominal:      General: There is no distension.      Tenderness: There is no abdominal tenderness. There is no guarding or rebound.   Musculoskeletal:         General: Normal range of motion.      Cervical back: Neck supple. No tenderness.      Right lower leg: No edema.      Left lower leg: No edema.   Skin:     General: Skin is warm and dry.      Capillary Refill: Capillary refill takes less than 2 seconds.   Neurological:      General: No focal deficit present.      Mental Status: She is alert and oriented to person, place, and time.   Psychiatric:         Mood and Affect: Mood normal.         Laboratory:  Recent Labs     02/24/25  1210   WBC 6.1   RBC 5.72*   HEMOGLOBIN 12.6   HEMATOCRIT 41.3   MCV 72.2*   MCH 22.0*   MCHC 30.5*   RDW 66.6*   PLATELETCT 316   MPV 9.7     Recent Labs     02/24/25  1210 02/24/25  1616   SODIUM 136 139   POTASSIUM 3.4* 3.1*   CHLORIDE 102 114*   CO2 13* 14*   GLUCOSE 116* 94   BUN 14 13   CREATININE 1.20 0.86   CALCIUM 9.0 7.4*     Recent Labs     02/24/25  1210 02/24/25  1616   ALTSGPT 18  --    ASTSGOT 41  --    ALKPHOSPHAT 151*  --    TBILIRUBIN 1.0  --    LIPASE  --  27   GLUCOSE 116* 94     Recent Labs     02/24/25  1210 02/24/25  1738   APTT 28.8  --    INR 1.17* 1.32*     No results for input(s): \"NTPROBNP\" in the last 72 hours.    " "  Recent Labs     02/24/25  1210 02/24/25  1616   TROPONINT 25* 46*       Imaging:  DX-CHEST-PORTABLE (1 VIEW)   Final Result      No acute cardiopulmonary disease.      CT-CTA NECK WITH & W/O-POST PROCESSING   Final Result      1.  No focal high-grade stenosis, dissection or occlusion of the cervical carotid or vertebral arteries.   2.  Dental disease.   3.  Cystic lesions in the RIGHT tonsil measuring 8 mm each, of uncertain significance.      CT-CTA HEAD WITH & W/O-POST PROCESS   Final Result      1.  No intracranial aneurysm, focal high-grade stenosis, or abrupt vessel cut off.   2.  No gross intracranial hemorrhage, however presence of intravenous contrast may exclude small amounts of subarachnoid hemorrhage.      CT-CEREBRAL PERFUSION ANALYSIS   Final Result      1. Cerebral blood flow less than 30% possibly representing completed infarct = 0 mL. Based on distribution of this finding, this is unlikely to represent artifact.      2. T Max more than 6 seconds possibly representing combination of completed infarct and ischemia = 0 mL. Based on the distribution of this finding, this is unlikely to represent artifact.      3. Mismatched volume possibly representing ischemic brain/penumbra= 0 mL      4.  Please note that this cerebral perfusion study and report is Quantitative and targets supratentorial (cerebral) perfusion for evaluation of large vessel territory acute ischemia/infarction. For example, lacunar infarcts, and brainstem/posterior fossa    ischemia/infarction are not evaluated on this study.  Data acquisition is subject to artifacts which can yield non-anatomically plausible perfusion maps which may be due to motion, bolus timing, signal to noise ratio, or other technical factors.    Perfusion map abnormalities which show non-anatomic distributions are likely artifact.   This study is not \"stand-alone\" and should only be utilized for diagnosis, management/treatment in correlation with CT, CTA, and/or " MRI and clinical factors.         EC-ECHOCARDIOGRAM COMPLETE W/O CONT    (Results Pending)   US-RENAL    (Results Pending)       X-Ray:  I have personally reviewed the images and compared with prior images.  EKG:  I have personally reviewed the images and compared with prior images.    Assessment/Plan:  Justification for Admission Status  I anticipate this patient will require at least 2 midnights hospitalization, therefore appropriate for inpatient status.      * Hypotension- (present on admission)  Assessment & Plan  Improved with IVF  Continue IVF  Midodrine for now    SOREN (acute kidney injury) (HCC)  Assessment & Plan  Cr 1.2  Improved with IVF  Check FeNa, renal US  Minimize nephrotoxic agents, renally dose meds    Pneumonia due to infectious organism  Assessment & Plan  CAP, procal 21.1  IVF  Abx: Unasyn, azithromycin  Follow cultures    Acute UTI  Assessment & Plan  UA pyuria, hypotension  IVF  Antibiotic: Unasyn  -s/p Ancef  Follow cultures    Sepsis ([facility])  Assessment & Plan  This is Sepsis Present on admission  SIRS criteria identified on my evaluation include: Tachycardia, with heart rate greater than 90 BPM, Tachypnea, with respirations greater than 20 per minute, and Bandemia, greater than 10% bands  Clinical indicators of end organ dysfunction include Hypotension with systolic blood pressure less than 90 or MAP less than 65  Source is /pulm  Sepsis protocol initiated  Crystalloid Fluid Administration: Fluid resuscitation ordered per standard protocol - 30 mL/kg per current or ideal body weight  IV antibiotics as appropriate for source of sepsis  Reassessment: I have reassessed the patient's hemodynamic status    High anion gap metabolic acidosis  Assessment & Plan  Likely dehydrated  IVF  Serial labs    Hypocalcemia  Assessment & Plan  Replace as appropriate    Refeeding syndrome  Assessment & Plan  Suspected  Phosphorus 1.8  Replace as appropriate    Hypokalemia  Assessment & Plan  Potassium  3.4->3.1  Replace as appropriate  Replace Mg        VTE prophylaxis: enoxaparin ppx

## 2025-02-25 NOTE — PROGRESS NOTES
4 Eyes Skin Assessment Completed by marichuy, DMITRY and DMITRY raya.    Head WDL  Ears WDL  Nose WDL  Mouth WDL  Neck WDL  Breast/Chest WDL  Shoulder Blades WDL  Spine WDL  (R) Arm/Elbow/Hand WDL  (L) Arm/Elbow/Hand WDL  Abdomen WDL  Groin WDL  Scrotum/Coccyx/Buttocks WDL  (R) Leg WDL  (L) Leg WDL  (R) Heel/Foot/Toe WDL  (L) Heel/Foot/Toe WDL          Devices In Places Tele Box and Blood Pressure Cuff      Interventions In Place Pillows and Pressure Redistribution Mattress    Possible Skin Injury No    Pictures Uploaded Into Epic N/A  Wound Consult Placed N/A  RN Wound Prevention Protocol Ordered No

## 2025-02-25 NOTE — PROGRESS NOTES
Monitor Summary     Rhythm: SR  Heart Rate: 69-90  Ectopy: none  Measurement: .15/.08/.34

## 2025-02-25 NOTE — HOSPITAL COURSE
30-year-old female with no significant past medical history presented 2/24 with unresponsive.  Patient denied any symptoms except some headache, denied any drug abuse or alcoholism.  Denied nausea or vomiting and no fever.  Patient was found to have metabolic acidosis with SOREN dehydration, IV fluid was initiated, procalcitonin was elevated, ultrasound for abdomen showed right hydronephrosis with uterus mass.  Patient recently had delivery couple weeks ago.  Urologist was consulted and Jarquin was replaced.  Patient was on Unasyn and azithromycin for possible infection.

## 2025-02-25 NOTE — DISCHARGE INSTRUCTIONS
Your exam is normal, your head CT is normal and your lab test are normal.  Your headache is improved with medication.  This may have been a seizure but there was no seizure activity seen this could also be a complex migraine and as your symptoms are normal and your headache is improved you are stable for discharge home.  Use Motrin Tylenol for headaches, return if symptoms recur or for any change or worsening symptoms

## 2025-02-25 NOTE — CONSULTS
Urology Consultation    Date of Service  2/25/2025    Referring Physician  Chano Mckay M.D.    Consulting Physician  SEAN Luna.    Reason for Consultation    Right hydronephrosis +UTI    History of Presenting Illness  30 y.o. female who presented 2/24/2025 with unresponsiveness x20-30 minutes. She recently delivered on 02/07/25. UDS was negative on admission. She is currently A and O 4.    She was admitted and creatinine was 2.10, Renal US was done and showed severe right hydronephrosis with distended bladder. Most recent Creatinine 0.80. WBC 12.6     CT was recommended by our team for further evaluation and showed mild to moderate hydroureteronephrosis and mild left hydroureteronephrosis likely secondary to mass effect of the ureters by enlarged uterus. Also noted adnexal venous structures    UA + Nitrites, and blood        Review of Systems  Review of Systems   Genitourinary:  Negative for flank pain.       Past Medical History    She just delivered at [facility] on 02/5/25   has a past medical history of Urinary tract infection.    She has no past medical history of Addisons disease ([facility]), Adrenal disorder ([facility]), Allergy, Anemia, Anxiety, Arrhythmia, Arthritis, Asthma, Blood transfusion without reported diagnosis, Cancer ([facility]), Cataract, CHF (congestive heart failure) ([facility]), Clotting disorder ([facility]), COPD (chronic obstructive pulmonary disease) ([facility]), Cushings syndrome ([facility]), Depression, Diabetes ([facility]), Diabetic neuropathy ([facility]), GERD (gastroesophageal reflux disease), Glaucoma, Goiter, Head ache, Heart attack ([facility]), Heart murmur, HIV (human immunodeficiency virus infection) ([facility]), Hyperlipidemia, Hypertension, IBD (inflammatory bowel disease), Kidney disease, Meningitis, Migraine, Muscle disorder, Osteoporosis, Parathyroid disorder ([facility]), Pituitary disease ([facility]), Pulmonary emphysema (HCC), Seizure ([facility]), Sickle cell disease ([facility]), Stroke (HCC), Substance abuse ([facility]), Thyroid disease, or  Tuberculosis.    Surgical History   has a past surgical history that includes dilation and curettage (N/A, 4/10/2024).    Family History  family history includes No Known Problems in her brother, father, mother, and sister.    Social History   reports that she has never smoked. She has never used smokeless tobacco. She reports that she does not drink alcohol and does not use drugs.    Medications  Current Facility-Administered Medications   Medication Dose Route Frequency Provider Last Rate Last Admin    acetaminophen (Tylenol) tablet 650 mg  650 mg Oral Q6HRS PRN Parish Young M.D.        labetalol (Normodyne/Trandate) injection 10 mg  10 mg Intravenous Q4HRS PRN Parish Young M.D.        ondansetron (Zofran) syringe/vial injection 4 mg  4 mg Intravenous Q4HRS PRN Parish Young M.D.        ondansetron (Zofran ODT) dispertab 4 mg  4 mg Oral Q4HRS PRN Parish Young M.D.        promethazine (Phenergan) tablet 12.5-25 mg  12.5-25 mg Oral Q4HRS PRN Parish Young M.D.        promethazine (Phenergan) suppository 12.5-25 mg  12.5-25 mg Rectal Q4HRS PRN Parish Young M.D.        prochlorperazine (Compazine) injection 5-10 mg  5-10 mg Intravenous Q4HRS PRN Parish Young M.D.        senna-docusate (Pericolace Or Senokot S) 8.6-50 MG per tablet 2 Tablet  2 Tablet Oral Q EVENING Parish Young M.D.        And    polyethylene glycol/lytes (Miralax) Packet 1 Packet  1 Packet Oral QDAY PRN Parish Young M.D.        pantoprazole (Protonix) injection 40 mg  40 mg Intravenous BID Parish Young M.D.   40 mg at 02/25/25 0625    lactated ringers infusion   Intravenous Continuous Parish Young M.D.   Stopped at 02/25/25 0627    ampicillin/sulbactam (Unasyn) 3 g in  mL IVPB  3 g Intravenous Q6HRS Parish Young M.D.   Stopped at 02/25/25 0650    azithromycin (ZITHROMAX) 500 mg in D5W 250 mL IVPB premix  500 mg Intravenous Q24HRS Parsih Young M.D.   Stopped at 02/25/25 0728    midodrine (Proamatine) tablet 10 mg  10 mg Oral TID WITH  MEALS Parish Young M.D.   10 mg at 02/25/25 0804       Allergies  No Known Allergies    Physical Exam  Temp:  [36.3 °C (97.3 °F)-37.3 °C (99.1 °F)] 36.4 °C (97.5 °F)  Pulse:  [] 64  Resp:  [13-22] 17  BP: ()/(41-59) 95/54  SpO2:  [96 %-99 %] 96 %    Physical Exam  Constitutional:       Appearance: Normal appearance.   HENT:      Head: Normocephalic and atraumatic.   Eyes:      Extraocular Movements: Extraocular movements intact.   Pulmonary:      Effort: Pulmonary effort is normal.   Abdominal:      General: Abdomen is flat.      Palpations: Abdomen is soft.      Tenderness: There is no left CVA tenderness.   Neurological:      Mental Status: She is alert.         Fluids  Date 02/25/25 0700 - 02/26/25 0659   Shift 5757-3560 1672-3749 8746-9377 24 Hour Total   INTAKE   Shift Total       OUTPUT   Urine 1100   1100   Shift Total 1100   1100   Weight (kg) 54.7 54.7 54.7 54.7       Laboratory  Recent Labs     02/24/25  1210 02/25/25  0007   WBC 6.1 12.6*   RBC 5.72* 4.30   HEMOGLOBIN 12.6 9.8*   HEMATOCRIT 41.3 31.4*   MCV 72.2* 73.0*   MCH 22.0* 22.8*   MCHC 30.5* 31.2*   RDW 66.6* 67.9*   PLATELETCT 316 224   MPV 9.7 9.4     Recent Labs     02/24/25  1210 02/24/25  1616 02/25/25  0007   SODIUM 136 139 139   POTASSIUM 3.4* 3.1* 4.5   CHLORIDE 102 114* 112   CO2 13* 14* 14*   GLUCOSE 116* 94 118*   BUN 14 13 10   CREATININE 1.20 0.86 0.80   CALCIUM 9.0 7.4* 7.4*     Recent Labs     02/24/25  1210 02/24/25  1738   APTT 28.8  --    INR 1.17* 1.32*                Latest Reference Range & Units 02/24/25 15:55   Color  Yellow   Character  Clear   Specific Gravity <1.035  >1.045   Ph 5.0 - 8.0  6.5   Glucose Negative mg/dL Negative   Ketones Negative mg/dL Negative   Bilirubin Negative  Negative   Occult Blood Negative  Large !   Protein Negative mg/dL 30 !   Nitrite Negative  Positive !   Leukocyte Esterase Negative  Negative   Urobilinogen, Urine <=1.0 EU/dL 1.0   Micro Urine Req  Microscopic   WBC /hpf 0-2    RBC 0 - 2 /hpf 0-2   Epithelial Cells 0 - 5 /hpf 0-2   Bacteria None /hpf Many !   Urine Casts 0 - 2 /lpf 0-2   !: Data is abnormal    Imaging    CT-ABDOMEN-PELVIS WITH  Order: [id number]   Status: Final result       Next appt: None    Test Result Released: No (inaccessible in School Admissions)    0 Result Notes  Details    Reading Physician Reading Date Result Priority   Robert Mercer M.D.  [phone]     2/25/2025      Narrative & Impression     2/25/2025 8:49 AM     HISTORY/REASON FOR EXAM:  Severe right hydronephrosis, possible infection.     TECHNIQUE/EXAM DESCRIPTION:   CT scan of the abdomen and pelvis with contrast.     Contrast-enhanced helical scanning was obtained from the diaphragmatic domes through the pubic symphysis following the bolus administration of nonionic contrast without complication.     95 mL of Omnipaque 350 nonionic contrast was administered without complication.     Low dose optimization technique was utilized for this CT exam including automated exposure control and adjustment of the mA and/or kV according to patient size.     COMPARISON: Renal ultrasound, 2/25/2025.     FINDINGS:  Lower Chest: Unremarkable.     Liver: Normal.     Spleen: Unremarkable.     Pancreas: Unremarkable.     Gallbladder: No calcified stones.     Biliary: Nondilated.     Adrenal glands: Normal.     Kidneys: There is bilateral hydronephrosis, mild to moderate on the right and mild on the left. Renal pelvis dilation is restricted to the central renal pelvis and central calyces, without peripheral calyceal dilation. The ureters are fluid-filled.     Bowel: No obstruction or acute inflammation. Normal appendix.     Lymph nodes: No adenopathy.     Vasculature: Unremarkable.     Peritoneum: Unremarkable without ascites.     Musculoskeletal: No acute or destructive process.     Pelvis: No adenopathy or free fluid. The uterus is enlarged. Adnexal structures are unremarkable, with the exception of dilated venous  structures in the adnexal regions, greater on the right than the left and dilated gonadal veins.     The bladder is unremarkable.     IMPRESSION:     1. Mild to moderate right hydroureteronephrosis and mild left hydroureteronephrosis, likely secondary to mass effect of the ureters by the enlarged uterus.  2. The kidneys are otherwise unremarkable.  3. Dilated bilateral adnexal venous structures, greater on the right than the left. Correlate clinically for pelvic congestion syndrome.       Assessment/Plan    Placement of hilton catheter for distended bladder and for hydronephrosis    No plan for surgical intervention at this time. Will continue to monitor and order appropriate imaging for follow up in a few days to assess hydronephrosis    Monitor WBC, and Creatinine/BUN.

## 2025-02-25 NOTE — DISCHARGE PLANNING
Care Transition Team Assessment  LMSW met with pt to conduct assessment and verify demographics. Pt Is AOX4 and provided the following information below. Pt was admitted on 2/24/25 for Hypotension. Please see pt's H&P for prior medical history.     Pt does not have a PCP.    Pt verified address on file. She stated that she lives with her aunt in a SS house with not steps to enter.     Pt verified emergency contacts on file. Emergency contact is Domitila Peguero [phone].     Prior to admission pt was independent with ADLS and IADLS with no use of DME.     Pt's preferred pharmacy is [facility] on [address].    Pt denies SA/MH concerns. Pt is currently unemployed and does not have income.    Pt is pending medicaid.     Upon DC pt will have means of transportation home.     Information Source   Orientation Level: Oriented X4  Information Given By: Patient  Who is responsible for making decisions for patient? : Patient    Readmission Evaluation  Is this a readmission?: No    Elopement Risk  Legal Hold: No  Ambulatory or Self Mobile in Wheelchair: Yes  Disoriented: No  Psychiatric Symptoms: None  History of Wandering: No  Elopement this Admit: No  Vocalizing Wanting to Leave: No  Displays Behaviors, Body Language Wanting to Leave: No-Not at Risk for Elopement  Elopement Risk: Not at Risk for Elopement    Interdisciplinary Discharge Planning  Lives with - Patient's Self Care Capacity: Spouse  Patient or legal guardian wants to designate a caregiver: No  Support Systems: Family Member(s), Spouse / Significant Other  Housing / Facility: [address]    Discharge Preparedness  What is your plan after discharge?: Home with help  What are your discharge supports?:  (aunt)  Prior Functional Level: Ambulatory, Independent with Activities of Daily Living, Independent with Medication Management  Difficulity with ADLs: None  Difficulity with IADLs: None    Functional Assesment  Prior Functional Level: Ambulatory, Independent with  Activities of Daily Living, Independent with Medication Management    Finances  Financial Barriers to Discharge: No  Prescription Coverage: No    Vision / Hearing Impairment  Vision Impairment : No  Hearing Impairment : No    Advance Directive  Advance Directive?: None    Domestic Abuse  Have you ever been the victim of abuse or violence?: No  Possible Abuse/Neglect Reported to:: Not Applicable    Psychological Assessment  History of Substance Abuse: None  History of Psychiatric Problems: No  Non-compliant with Treatment: No  Newly Diagnosed Illness: No    Discharge Risks or Barriers  Discharge risks or barriers?: No    Anticipated Discharge Information  Discharge Disposition: Discharged to home/self care (01)  Discharge Address: [address] [place] [address] [place] [id number]  Discharge Contact Phone Number: [phone]

## 2025-02-25 NOTE — CARE PLAN
The patient is Watcher - Medium risk of patient condition declining or worsening    Shift Goals  Clinical Goals: VSS  Patient Goals: POC  Family Goals: POC    Progress made toward(s) clinical / shift goals:    Problem: Knowledge Deficit - Standard  Goal: Patient and family/care givers will demonstrate understanding of plan of care, disease process/condition, diagnostic tests and medications  Outcome: Progressing     Problem: Hemodynamics  Goal: Patient's hemodynamics, fluid balance and neurologic status will be stable or improve  Outcome: Progressing     Problem: Fluid Volume  Goal: Fluid volume balance will be maintained  Outcome: Progressing       Patient is not progressing towards the following goals:

## 2025-02-25 NOTE — PROGRESS NOTES
Bedside report received from off going RN/tech: Landy, assumed care of patient.     Fall Risk Score: MODERATE RISK  Fall risk interventions in place: Place yellow fall risk ID band on patient, Provide patient/family education based on risk assessment, Educate patient/family to call staff for assistance when getting out of bed, Place fall precaution signage outside patient door, Place patient in room close to nursing station, Utilize bed/chair fall alarm, Notify charge of high risk for huddle, and Bed alarm connected correctly  Bed type: Regular (David Score less than 17 interventions in place)  Patient on cardiac monitor: Yes  IVF/IV medications: Infusion per MAR (List Med(s)) Abx  Oxygen: Room Air  Bedside sitter: Not Applicable   Isolation: Not applicable

## 2025-02-26 ENCOUNTER — APPOINTMENT (OUTPATIENT)
Dept: RADIOLOGY | Facility: MEDICAL CENTER | Age: 30
End: 2025-02-26
Attending: INTERNAL MEDICINE
Payer: MEDICAID

## 2025-02-26 ENCOUNTER — PHARMACY VISIT (OUTPATIENT)
Dept: PHARMACY | Facility: MEDICAL CENTER | Age: 30
End: 2025-02-26
Payer: MEDICARE

## 2025-02-26 VITALS
TEMPERATURE: 97.9 F | OXYGEN SATURATION: 94 % | HEIGHT: 61 IN | DIASTOLIC BLOOD PRESSURE: 63 MMHG | RESPIRATION RATE: 17 BRPM | SYSTOLIC BLOOD PRESSURE: 112 MMHG | WEIGHT: 114.2 LBS | BODY MASS INDEX: 21.56 KG/M2 | HEART RATE: 59 BPM

## 2025-02-26 DIAGNOSIS — N13.39 OTHER HYDRONEPHROSIS: ICD-10-CM

## 2025-02-26 LAB
ANION GAP SERPL CALC-SCNC: 10 MMOL/L (ref 7–16)
BACTERIA UR CULT: ABNORMAL
BACTERIA UR CULT: ABNORMAL
BUN SERPL-MCNC: 12 MG/DL (ref 8–22)
CALCIUM SERPL-MCNC: 8.3 MG/DL (ref 8.5–10.5)
CHLORIDE SERPL-SCNC: 113 MMOL/L (ref 96–112)
CO2 SERPL-SCNC: 16 MMOL/L (ref 20–33)
CREAT SERPL-MCNC: 0.84 MG/DL (ref 0.5–1.4)
CRP SERPL HS-MCNC: 5.84 MG/DL (ref 0–0.75)
ERYTHROCYTE [DISTWIDTH] IN BLOOD BY AUTOMATED COUNT: 68.4 FL (ref 35.9–50)
GFR SERPLBLD CREATININE-BSD FMLA CKD-EPI: 97 ML/MIN/1.73 M 2
GLUCOSE SERPL-MCNC: 93 MG/DL (ref 65–99)
HCT VFR BLD AUTO: 32 % (ref 37–47)
HGB BLD-MCNC: 9.7 G/DL (ref 12–16)
HGB RETIC QN AUTO: 22.6 PG/CELL (ref 29–35)
IMM RETICS NFR: 14.8 % (ref 2.6–16.1)
LACTATE SERPL-SCNC: 0.6 MMOL/L (ref 0.5–2)
M PNEUMO IGM SER IA-ACNC: 0.84 U/L
MCH RBC QN AUTO: 22.1 PG (ref 27–33)
MCHC RBC AUTO-ENTMCNC: 30.3 G/DL (ref 32.2–35.5)
MCV RBC AUTO: 73.1 FL (ref 81.4–97.8)
PLATELET # BLD AUTO: 263 K/UL (ref 164–446)
PMV BLD AUTO: 9.8 FL (ref 9–12.9)
POTASSIUM SERPL-SCNC: 3.8 MMOL/L (ref 3.6–5.5)
PROCALCITONIN SERPL-MCNC: 13.5 NG/ML
RBC # BLD AUTO: 4.38 M/UL (ref 4.2–5.4)
RETICS # AUTO: 0.01 M/UL (ref 0.04–0.12)
RETICS/RBC NFR: 0.3 % (ref 0.8–2.6)
SIGNIFICANT IND 70042: ABNORMAL
SITE SITE: ABNORMAL
SODIUM SERPL-SCNC: 139 MMOL/L (ref 135–145)
SOURCE SOURCE: ABNORMAL
WBC # BLD AUTO: 9 K/UL (ref 4.8–10.8)

## 2025-02-26 PROCEDURE — 86140 C-REACTIVE PROTEIN: CPT

## 2025-02-26 PROCEDURE — 76856 US EXAM PELVIC COMPLETE: CPT

## 2025-02-26 PROCEDURE — 84145 PROCALCITONIN (PCT): CPT

## 2025-02-26 PROCEDURE — 700105 HCHG RX REV CODE 258: Performed by: STUDENT IN AN ORGANIZED HEALTH CARE EDUCATION/TRAINING PROGRAM

## 2025-02-26 PROCEDURE — 99231 SBSQ HOSP IP/OBS SF/LOW 25: CPT

## 2025-02-26 PROCEDURE — 83605 ASSAY OF LACTIC ACID: CPT

## 2025-02-26 PROCEDURE — RXMED WILLOW AMBULATORY MEDICATION CHARGE: Performed by: INTERNAL MEDICINE

## 2025-02-26 PROCEDURE — 85027 COMPLETE CBC AUTOMATED: CPT

## 2025-02-26 PROCEDURE — 99239 HOSP IP/OBS DSCHRG MGMT >30: CPT | Performed by: INTERNAL MEDICINE

## 2025-02-26 PROCEDURE — 85046 RETICYTE/HGB CONCENTRATE: CPT

## 2025-02-26 PROCEDURE — 700111 HCHG RX REV CODE 636 W/ 250 OVERRIDE (IP): Performed by: STUDENT IN AN ORGANIZED HEALTH CARE EDUCATION/TRAINING PROGRAM

## 2025-02-26 PROCEDURE — 80048 BASIC METABOLIC PNL TOTAL CA: CPT

## 2025-02-26 PROCEDURE — 700111 HCHG RX REV CODE 636 W/ 250 OVERRIDE (IP): Mod: JZ | Performed by: INTERNAL MEDICINE

## 2025-02-26 PROCEDURE — 700105 HCHG RX REV CODE 258: Performed by: INTERNAL MEDICINE

## 2025-02-26 RX ORDER — MIDODRINE HYDROCHLORIDE 5 MG/1
10 TABLET ORAL 3 TIMES DAILY PRN
Status: DISCONTINUED | OUTPATIENT
Start: 2025-02-26 | End: 2025-02-26 | Stop reason: HOSPADM

## 2025-02-26 RX ORDER — CEPHALEXIN 500 MG/1
500 CAPSULE ORAL 4 TIMES DAILY
Qty: 12 CAPSULE | Refills: 0 | Status: SHIPPED | OUTPATIENT
Start: 2025-02-26 | End: 2025-02-26

## 2025-02-26 RX ORDER — OMEPRAZOLE 20 MG/1
20 CAPSULE, DELAYED RELEASE ORAL DAILY
Qty: 30 CAPSULE | Refills: 2 | Status: SHIPPED | OUTPATIENT
Start: 2025-02-26

## 2025-02-26 RX ORDER — CEPHALEXIN 500 MG/1
500 CAPSULE ORAL 4 TIMES DAILY
Qty: 28 CAPSULE | Refills: 0 | Status: ACTIVE | OUTPATIENT
Start: 2025-02-26 | End: 2025-03-05

## 2025-02-26 RX ORDER — OMEPRAZOLE 20 MG/1
20 CAPSULE, DELAYED RELEASE ORAL 2 TIMES DAILY
Status: DISCONTINUED | OUTPATIENT
Start: 2025-02-26 | End: 2025-02-26 | Stop reason: HOSPADM

## 2025-02-26 RX ADMIN — SODIUM CHLORIDE 250 MG: 9 INJECTION, SOLUTION INTRAVENOUS at 05:26

## 2025-02-26 RX ADMIN — PANTOPRAZOLE SODIUM 40 MG: 40 INJECTION, POWDER, FOR SOLUTION INTRAVENOUS at 04:50

## 2025-02-26 RX ADMIN — SODIUM CHLORIDE, POTASSIUM CHLORIDE, SODIUM LACTATE AND CALCIUM CHLORIDE: 600; 310; 30; 20 INJECTION, SOLUTION INTRAVENOUS at 06:16

## 2025-02-26 ASSESSMENT — ENCOUNTER SYMPTOMS: FLANK PAIN: 0

## 2025-02-26 ASSESSMENT — PAIN DESCRIPTION - PAIN TYPE: TYPE: ACUTE PAIN

## 2025-02-26 ASSESSMENT — FIBROSIS 4 INDEX: FIB4 SCORE: 1

## 2025-02-26 NOTE — PROGRESS NOTES
Jarquin catheter standard drainage bag changed to leg bag. Home catheter care education provided and patient instructed to make urology follow-up. DC lounge order placed. Tele box removed and monitor room notified.

## 2025-02-26 NOTE — PROGRESS NOTES
Surgical Progress Note    Author: FRANCES Luna Date & Time created: 2025   11:09 AM     Interval Events:  No events overnight.  Jarquin catheter in place draining clear yellow urine  Creatinine and BUN stable    Review of Systems   Genitourinary:  Negative for flank pain.     Hemodynamics:  Temp (24hrs), Av.5 °C (97.7 °F), Min:36 °C (96.8 °F), Max:36.7 °C (98.1 °F)  Temperature: 36.6 °C (97.9 °F), Monitored Temp: 36.5 °C (97.7 °F)  Pulse  Av.1  Min: 51  Max: 119   Blood Pressure: 112/63     Respiratory:    Respiration: 17, Pulse Oximetry: 94 %           Neuro:  GCS       Fluids:    Intake/Output Summary (Last 24 hours) at 2025 1109  Last data filed at 2025 0914  Gross per 24 hour   Intake 1612.45 ml   Output 3240 ml   Net -1627.55 ml     Weight: 51.8 kg (114 lb 3.2 oz)  Current Diet Order   Procedures    Diet Order Diet: Regular     Physical Exam  Constitutional:       Appearance: Normal appearance.   HENT:      Head: Normocephalic and atraumatic.   Eyes:      Extraocular Movements: Extraocular movements intact.   Abdominal:      General: Abdomen is flat. There is no distension.      Palpations: Abdomen is soft.      Tenderness: There is no abdominal tenderness. There is no guarding.   Genitourinary:     Comments: Jarquin catheter in place   Neurological:      Mental Status: She is alert.       Labs:  Recent Results (from the past 24 hours)   Basic Metabolic Panel    Collection Time: 25  3:30 AM   Result Value Ref Range    Sodium 139 135 - 145 mmol/L    Potassium 3.8 3.6 - 5.5 mmol/L    Chloride 113 (H) 96 - 112 mmol/L    Co2 16 (L) 20 - 33 mmol/L    Glucose 93 65 - 99 mg/dL    Bun 12 8 - 22 mg/dL    Creatinine 0.84 0.50 - 1.40 mg/dL    Calcium 8.3 (L) 8.5 - 10.5 mg/dL    Anion Gap 10.0 7.0 - 16.0   RETICULOCYTES COUNT    Collection Time: 25  3:30 AM   Result Value Ref Range    Reticulocyte Count 0.3 (L) 0.8 - 2.6 %    Retic, Absolute 0.01 (L) 0.04 - 0.12 M/uL    Imm.  Reticulocyte Fraction 14.8 2.6 - 16.1 %    Retic Hgb Equivalent 22.6 (L) 29.0 - 35.0 pg/cell   PROCALCITONIN    Collection Time: 02/26/25  3:30 AM   Result Value Ref Range    Procalcitonin 13.50 (HH) <0.25 ng/mL   CRP QUANTITIVE (NON-CARDIAC)    Collection Time: 02/26/25  3:30 AM   Result Value Ref Range    Stat C-Reactive Protein 5.84 (H) 0.00 - 0.75 mg/dL   CBC WITHOUT DIFFERENTIAL    Collection Time: 02/26/25  3:30 AM   Result Value Ref Range    WBC 9.0 4.8 - 10.8 K/uL    RBC 4.38 4.20 - 5.40 M/uL    Hemoglobin 9.7 (L) 12.0 - 16.0 g/dL    Hematocrit 32.0 (L) 37.0 - 47.0 %    MCV 73.1 (L) 81.4 - 97.8 fL    MCH 22.1 (L) 27.0 - 33.0 pg    MCHC 30.3 (L) 32.2 - 35.5 g/dL    RDW 68.4 (H) 35.9 - 50.0 fL    Platelet Count 263 164 - 446 K/uL    MPV 9.8 9.0 - 12.9 fL   LACTIC ACID    Collection Time: 02/26/25  3:30 AM   Result Value Ref Range    Lactic Acid 0.6 0.5 - 2.0 mmol/L   ESTIMATED GFR    Collection Time: 02/26/25  3:30 AM   Result Value Ref Range    GFR (CKD-EPI) 97 >60 mL/min/1.73 m 2     Medical Decision Making, by Problem:  Active Hospital Problems    Diagnosis     Hydronephrosis [N13.30]     Hypotension [I95.9]     Hypokalemia [E87.6]     Refeeding syndrome [E87.8]     Hypocalcemia [E83.51]     High anion gap metabolic acidosis [E87.29]     Sepsis (HCC) [A41.9]     SOREN (acute kidney injury) (HCC) [N17.9]      Plan:    DC home with hilton catheter and complete renal ultrasound prior to follow up in the clinic on Friday or Monday. Imaging must be done before catheter is removed to assess improvement in hydronephrosis. This was reiterated to patient who verbalized understanding and agreed with plan.    Our office will reach out to her for a follow up appointment on Friday or Monday depending on when imaging is done. I have ordered a renal ultrasound to be completed.     Quality Measures:  Quality-Core Measures    Discussed patient condition with Hospitalist and Patient

## 2025-02-26 NOTE — DISCHARGE PLANNING
Case Management Discharge Planning    Admission Date: 2/24/2025  GMLOS: 4.9  ALOS: 2    6-Clicks ADL Score: 21  6-Clicks Mobility Score: 21      Anticipated Discharge Dispo: Discharge Disposition: Discharged to home/self care (01)  Discharge Address: [address] [address]  Discharge Contact Phone Number: [phone]    DME Needed: No    Action(s) Taken: OTHER  Discussed in IDT. Patient to be discharged today with hilton catheter. Needs to f/u with [facility] in clinic Friday or Monday.   It was determined that bedside RN will ensure this appointment is in place.     Escalations Completed: None    Medically Clear: Yes    Next Steps: follow to discharge for any needs.     Barriers to Discharge: None    Is the patient up for discharge tomorrow: No - today

## 2025-02-26 NOTE — CARE PLAN
The patient is Watcher - Medium risk of patient condition declining or worsening    Shift Goals  Clinical Goals: VSS, monitor labs  Patient Goals: POC  Family Goals: POC    Progress made toward(s) clinical / shift goals:    Problem: Knowledge Deficit - Standard  Goal: Patient and family/care givers will demonstrate understanding of plan of care, disease process/condition, diagnostic tests and medications  Outcome: Progressing  Note: Discussed plan of care, pt able to actively participate in the learning process and demonstrates understanding by return demonstration or return explanation. Improved ability to communicate regarding their healthcare and current admission. Improved ability to identify barriers to learning and care.       Problem: Hemodynamics  Goal: Patient's hemodynamics, fluid balance and neurologic status will be stable or improve  Outcome: Progressing  Note: Vital signs stable, CMS intact, signs of bleeding assessed. I/O monitored. IV fluids infusing. Oral intake adequate. Peripheral pulses assessed and monitored. Capillary refill assessed. PRN blood pressure control meds given as needed.             Patient is not progressing towards the following goals:

## 2025-02-26 NOTE — PROGRESS NOTES
Bedside report received from day shift RN  DMITRY Gonsalves. Pt is currently A&Ox4, SR, breathing at a normal rate and rhythm, warm, dry, and skin color appropriate for ethnicity, and in no visible emotional or physical distress. Bed locked in lowest position, call light is within reach, fall prevention measures in place. Pt educated to use call light before getting out of bed, pt verbalized understanding. Pt is on a cardiac monitor, order verified, transmitter connected appropriately, and leads placed correctly, rhythm and rate assessed by RN, monitor staff updated of changes and parameters as necessary.  No further needs at this time. Assumed care of pt. Report given to assisting staff - CNA/CCT/ACT    Fall Risk Score: LOW RISK  Fall risk interventions in place: Provide patient/family education based on risk assessment, Educate patient/family to call staff for assistance when getting out of bed, Place fall precaution signage outside patient door, Place patient in room close to nursing station, Utilize bed/chair fall alarm, and Bed alarm connected correctly  Bed type: Regular (David Score less than 17 interventions in place)  Patient on cardiac monitor: Yes  IVF/IV medications: Not Applicable   Oxygen: Room Air  Bedside sitter: Not Applicable   Isolation: Not applicable

## 2025-02-26 NOTE — ASSESSMENT & PLAN NOTE
With uterus mass  CT scan showed likely uterus mass with mild to moderate hydronephrosis  Urologist was consulted and recommended Jarquin which was placed  IV fluid, ordered US pelvic  Recently patient had delivery, watching for any bleeding

## 2025-02-26 NOTE — PROGRESS NOTES
Bedside report received from off going RN/tech: Landy, assumed care of patient.     Fall Risk Score: LOW RISK  Fall risk interventions in place: Place yellow fall risk ID band on patient, Provide patient/family education based on risk assessment, Educate patient/family to call staff for assistance when getting out of bed, Place fall precaution signage outside patient door, and Place patient in room close to nursing station  Bed type: Regular (David Score less than 17 interventions in place)  Patient on cardiac monitor: Yes  IVF/IV medications: Infusion per MAR (List Med(s)) LR @ 125 ml/h  Oxygen: Room Air  Bedside sitter: Not Applicable   Isolation: Not applicable

## 2025-02-26 NOTE — DISCHARGE PLANNING
Case Management Discharge Planning    Admission Date: 2/24/2025  GMLOS: 4.9  ALOS: 2    6-Clicks ADL Score: 21  6-Clicks Mobility Score: 21      Anticipated Discharge Dispo: Discharge Disposition: Discharged to home/self care (01)  Discharge Address: [address] [address]  Discharge Contact Phone Number: [phone]    DME Needed: No    Action(s) Taken: OTHER  Discussed in AM discharge rounds. Pending urology clearance.    Escalations Completed: None    Medically Clear: No    Next Steps: follow for needs.     Barriers to Discharge: Medical clearance    Is the patient up for discharge tomorrow: No - possibly today. Patient has transport home.

## 2025-02-26 NOTE — DISCHARGE SUMMARY
Discharge Summary    CHIEF COMPLAINT ON ADMISSION  Chief Complaint   Patient presents with    Unresponsive     BIB family due sudden onset of unresponsiveness 20-30 mins PTA.  Per family, recent delivery, no drugs, ETOH. Only takes ibuprofen.        Reason for Admission  UTI and dehydration  Right hydronephrosis    Admission Date  2/24/2025    CODE STATUS  Full Code    HPI & HOSPITAL COURSE    30-year-old female with no significant past medical history presented 2/24 with unresponsive.  Patient denied any symptoms except some headache, denied any drug abuse or alcoholism.  Denied nausea or vomiting and no fever.  Patient was found to have metabolic acidosis with SOREN dehydration, IV fluid was initiated, procalcitonin was elevated, ultrasound for abdomen showed right hydronephrosis with uterus mass.  Patient recently had delivery couple weeks ago.  Urologist was consulted and Jarquin was replaced.  CT scan of her abdomen showed mild to moderate right hydronephrosis likely secondary to mass effect of the uterus, pelvic ultrasound also was done for more evaluation and did not show any solid uterus or endometrial mass however showed small amount of simple appearing fluid with an endometrial cavity, urologist recommended to discharge the patient with family, repeat ultrasound on 2/28 and then reevaluate the patient in clinic to remove the Jarquin.    Patient was on Unasyn and azithromycin for possible pneumonia infection, her leukocytosis was resolved, blood culture was negative however urine showed Enterococcus pansensitive, patient received IV fluids with improving on her blood pressure, patient was discharged on Keflex to finish course of antibiotics, also encouraged the patient to stay hydrated, and come back to the emergency for any new symptoms like fever or chills.    On the day of discharge the patient was alert and oriented x 4, denied any new symptoms, no abdominal pain with no urinary symptoms, Jarquin in place, plan  of care was discussed in detail with the patient and her boyfriend at bedside, ultrasound was ordered by urology team on 12/28 and encouraged the patient to follow-up with urologist to remove the Jarquin, discussed the high risk of infection and sepsis from the Jarquin, encouraged the patient to not remove her Jarquin by herself and follow-up with urologist, she understood and agreed    Therefore, she is discharged in good and stable condition to home with close outpatient follow-up.    The patient met 2-midnight criteria for an inpatient stay at the time of discharge.    Discharge Date  02/26/25      FOLLOW UP ITEMS POST DISCHARGE  Follow-up with urologist to remove Jarquin  Follow-up with OB    DISCHARGE DIAGNOSES  Principal Problem:    Hypotension (POA: Yes)  Active Problems:    Hydronephrosis (POA: Unknown)    Hypokalemia (POA: Unknown)    Refeeding syndrome (POA: Unknown)    Hypocalcemia (POA: Unknown)    High anion gap metabolic acidosis (POA: Unknown)    Sepsis (HCC) (POA: Unknown)    SOREN (acute kidney injury) (HCC) (POA: Unknown)  Resolved Problems:    Acute UTI (POA: Unknown)    Pneumonia due to infectious organism (POA: Unknown)      FOLLOW UP  [facility] [place]  [address] [facility] [address]  [facility] [address]  [phone]  In 1 week      [facility]  [address] [facility] [address]  [phone]  Follow up in 1 week(s)        MEDICATIONS ON DISCHARGE     Medication List        START taking these medications        Instructions   Acetaminophen Extra Strength 500 MG Tabs   Take 2 Tablets by mouth every 6 hours as needed for Mild Pain or Moderate Pain.  Dose: 1,000 mg     ascorbic acid 500 MG Tabs  Commonly known as: Ascorbic Acid   Take 1 Tablet by mouth every day.  Dose: 500 mg     cephALEXin 500 MG Caps  Commonly known as: Keflex   Take 1 Capsule by mouth 4 times a day for 7 days.  Dose: 500 mg     FeroSul 325 (65 Fe) MG tablet  Generic drug: ferrous sulfate   Take 1 Tablet by mouth  every day.  Dose: 325 mg     omeprazole 20 MG delayed-release capsule  Commonly known as: PriLOSEC   Take 1 Capsule by mouth every day.  Dose: 20 mg            STOP taking these medications      ibuprofen 200 MG Tabs  Commonly known as: Motrin     ibuprofen 800 MG Tabs  Commonly known as: Motrin              Allergies  No Known Allergies    DIET  Orders Placed This Encounter   Procedures    Diet Order Diet: Regular     Standing Status:   Standing     Number of Occurrences:   1     Diet::   Regular [1]       ACTIVITY  As tolerated.  Weight bearing as tolerated    CONSULTATIONS  Urology    PROCEDURES  None    LABORATORY  Lab Results   Component Value Date    SODIUM 139 02/26/2025    POTASSIUM 3.8 02/26/2025    CHLORIDE 113 (H) 02/26/2025    CO2 16 (L) 02/26/2025    GLUCOSE 93 02/26/2025    BUN 12 02/26/2025    CREATININE 0.84 02/26/2025        Lab Results   Component Value Date    WBC 9.0 02/26/2025    HEMOGLOBIN 9.7 (L) 02/26/2025    HEMATOCRIT 32.0 (L) 02/26/2025    PLATELETCT 263 02/26/2025        Total time of the discharge process exceeds 35 minutes.

## 2025-02-26 NOTE — PROGRESS NOTES
Ordered renal US which is to be completed for follow up by Friday. She will be seen in our clinic to evaluate if catheter can come out

## 2025-02-26 NOTE — PROGRESS NOTES
Hospital Medicine Daily Progress Note    Date of Service  2/25/2025    Chief Complaint  Shae Peguero is a 30 y.o. female admitted 2/24/2025 with headache    Hospital Course  30-year-old female with no significant past medical history presented 2/24 with unresponsive.  Patient denied any symptoms except some headache, denied any drug abuse or alcoholism.  Denied nausea or vomiting and no fever.  Patient was found to have metabolic acidosis with SOREN dehydration, IV fluid was initiated, procalcitonin was elevated, ultrasound for abdomen showed right hydronephrosis with uterus mass.  Patient recently had delivery couple weeks ago.  Urologist was consulted and Jarquin was replaced.  Patient was on Unasyn and azithromycin for possible infection.        Interval Problem Update  -Evaluate and examined the patient at bedside, denied any new symptoms, improving on her headache, no nausea or vomiting and no fever.  -Patient was found to have right hydronephrosis, urology was consulted, Jarquin was placed.  -No signs of infection or pneumonia on chest x-ray and white blood cell in urine is normal, discontinue antibiotic, continue IV fluids for low blood pressure and dehydration continue monitoring with labs tomorrow      I have discussed this patient's plan of care and discharge plan at IDT rounds today with Case Management, Nursing, Nursing leadership, and other members of the IDT team.    Consultants/Specialty  Urology    Code Status  Full Code    Disposition  The patient is not medically cleared for discharge to home or a post-acute facility.  Anticipate discharge to: home with close outpatient follow-up    I have placed the appropriate orders for post-discharge needs.    Review of Systems  Review of Systems   Constitutional:  Positive for malaise/fatigue. Negative for chills, fever and weight loss.   HENT:  Negative for ear pain, hearing loss and tinnitus.    Eyes:  Negative for blurred vision, double vision and  photophobia.   Respiratory:  Negative for cough and hemoptysis.    Cardiovascular:  Negative for chest pain, palpitations, orthopnea and claudication.   Gastrointestinal:  Negative for abdominal pain, constipation, diarrhea, nausea and vomiting.   Genitourinary:  Negative for dysuria, frequency and urgency.   Musculoskeletal:  Negative for myalgias and neck pain.   Skin:  Negative for rash.   Neurological:  Positive for headaches. Negative for dizziness, speech change and weakness.        Physical Exam  Temp:  [36 °C (96.8 °F)-37.3 °C (99.1 °F)] 36 °C (96.8 °F)  Pulse:  [51-97] 51  Resp:  [14-20] 17  BP: ()/(41-62) 96/56  SpO2:  [96 %-99 %] 98 %    Physical Exam  Constitutional:       General: She is not in acute distress.     Appearance: She is not ill-appearing.   HENT:      Head: Normocephalic and atraumatic.   Cardiovascular:      Rate and Rhythm: Normal rate.      Heart sounds: No murmur heard.  Pulmonary:      Effort: No respiratory distress.      Breath sounds: No wheezing or rales.   Abdominal:      General: There is no distension.      Palpations: Abdomen is soft.      Tenderness: There is no abdominal tenderness.   Neurological:      General: No focal deficit present.      Mental Status: She is oriented to person, place, and time. Mental status is at baseline.      Cranial Nerves: No cranial nerve deficit.      Motor: No weakness.         Fluids    Intake/Output Summary (Last 24 hours) at 2/25/2025 1610  Last data filed at 2/25/2025 1550  Gross per 24 hour   Intake 4064.82 ml   Output 2340 ml   Net 1724.82 ml        Laboratory  Recent Labs     02/24/25  1210 02/25/25  0007   WBC 6.1 12.6*   RBC 5.72* 4.30   HEMOGLOBIN 12.6 9.8*   HEMATOCRIT 41.3 31.4*   MCV 72.2* 73.0*   MCH 22.0* 22.8*   MCHC 30.5* 31.2*   RDW 66.6* 67.9*   PLATELETCT 316 224   MPV 9.7 9.4     Recent Labs     02/24/25  1210 02/24/25  1616 02/25/25  0007   SODIUM 136 139 139   POTASSIUM 3.4* 3.1* 4.5   CHLORIDE 102 114* 112   CO2 13*  14* 14*   GLUCOSE 116* 94 118*   BUN 14 13 10   CREATININE 1.20 0.86 0.80   CALCIUM 9.0 7.4* 7.4*     Recent Labs     02/24/25  1210 02/24/25  1738   APTT 28.8  --    INR 1.17* 1.32*               Imaging  CT-ABDOMEN-PELVIS WITH   Final Result      1. Mild to moderate right hydroureteronephrosis and mild left hydroureteronephrosis, likely secondary to mass effect of the ureters by the enlarged uterus.   2. The kidneys are otherwise unremarkable.   3. Dilated bilateral adnexal venous structures, greater on the right than the left. Correlate clinically for pelvic congestion syndrome.      US-RENAL   Final Result         1. Distended urinary bladder.   2. Severe right hydronephrosis.      DX-CHEST-PORTABLE (1 VIEW)   Final Result      No acute cardiopulmonary disease.      CT-CTA NECK WITH & W/O-POST PROCESSING   Final Result      1.  No focal high-grade stenosis, dissection or occlusion of the cervical carotid or vertebral arteries.   2.  Dental disease.   3.  Cystic lesions in the RIGHT tonsil measuring 8 mm each, of uncertain significance.      CT-CTA HEAD WITH & W/O-POST PROCESS   Final Result      1.  No intracranial aneurysm, focal high-grade stenosis, or abrupt vessel cut off.   2.  No gross intracranial hemorrhage, however presence of intravenous contrast may exclude small amounts of subarachnoid hemorrhage.      CT-CEREBRAL PERFUSION ANALYSIS   Final Result      1. Cerebral blood flow less than 30% possibly representing completed infarct = 0 mL. Based on distribution of this finding, this is unlikely to represent artifact.      2. T Max more than 6 seconds possibly representing combination of completed infarct and ischemia = 0 mL. Based on the distribution of this finding, this is unlikely to represent artifact.      3. Mismatched volume possibly representing ischemic brain/penumbra= 0 mL      4.  Please note that this cerebral perfusion study and report is Quantitative and targets supratentorial (cerebral)  "perfusion for evaluation of large vessel territory acute ischemia/infarction. For example, lacunar infarcts, and brainstem/posterior fossa    ischemia/infarction are not evaluated on this study.  Data acquisition is subject to artifacts which can yield non-anatomically plausible perfusion maps which may be due to motion, bolus timing, signal to noise ratio, or other technical factors.    Perfusion map abnormalities which show non-anatomic distributions are likely artifact.   This study is not \"stand-alone\" and should only be utilized for diagnosis, management/treatment in correlation with CT, CTA, and/or MRI and clinical factors.         EC-ECHOCARDIOGRAM COMPLETE W/O CONT    (Results Pending)   US-EXTREMITY VENOUS LOWER BILAT    (Results Pending)   US-PELVIC COMPLETE (TRANSABDOMINAL/TRANSVAGINAL) (COMBO)    (Results Pending)        Assessment/Plan  * Hypotension- (present on admission)  Assessment & Plan  Improved with IVF  Continue IVF  Midodrine for now    Hydronephrosis  Assessment & Plan  With uterus mass  CT scan showed likely uterus mass with mild to moderate hydronephrosis  Urologist was consulted and recommended Jarquin which was placed  IV fluid, ordered US pelvic  Recently patient had delivery, watching for any bleeding    SOREN (acute kidney injury) (HCC)  Assessment & Plan  Likely related dehydration  Improving with IV fluid    Sepsis (HCC)  Assessment & Plan  This is Sepsis Present on admission  SIRS criteria identified on my evaluation include: Tachycardia, with heart rate greater than 90 BPM, Tachypnea, with respirations greater than 20 per minute, and Bandemia, greater than 10% bands  Clinical indicators of end organ dysfunction include Hypotension with systolic blood pressure less than 90 or MAP less than 65  Source is /pulm  Sepsis protocol initiated  Crystalloid Fluid Administration: Fluid resuscitation ordered per standard protocol - 30 mL/kg per current or ideal body weight  IV antibiotics as " appropriate for source of sepsis  Reassessment: I have reassessed the patient's hemodynamic status    Elevated procalcitonin with no signs of infection  IV fluid and waiting for blood culture  At this time continue ceftriaxone    High anion gap metabolic acidosis  Assessment & Plan  Likely dehydrated  Improving IV fluid    Hypocalcemia  Assessment & Plan  Replace as appropriate    Refeeding syndrome  Assessment & Plan  Suspected  Phosphorus 1.8  Replace as appropriate    Hypokalemia  Assessment & Plan  Improvement         VTE prophylaxis:   SCDs/TEDs   enoxaparin ppx      I have performed a physical exam and reviewed and updated ROS and Plan today (2/25/2025). In review of yesterday's note (2/24/2025), there are no changes except as documented above.    Greater than 51 minutes spent prepping to see patient (e.g. review of tests) obtaining and/or reviewing separately obtained history. Performing a medically appropriate examination and/ evaluation.  Counseling and educating the patient/family/caregiver.  Ordering medications, tests, or procedures.  Referring and communicating with other health care professionals.  Documenting clinical information in EPIC.  Independently interpreting results and communicating results to patient/family/caregiver.  Care coordination

## 2025-02-27 ENCOUNTER — TELEPHONE (OUTPATIENT)
Dept: UROLOGY | Facility: MEDICAL CENTER | Age: 30
End: 2025-02-27
Payer: MEDICAID

## 2025-02-27 DIAGNOSIS — N13.39 OTHER HYDRONEPHROSIS: ICD-10-CM

## 2025-02-27 NOTE — TELEPHONE ENCOUNTER
Called to inform patient of new stat imaging order requested by Marcela Acevedo. Patient vm is not set up. Called step dad and phone is disconnected.

## 2025-02-28 LAB
L PNEUMO AG UR QL IA: NEGATIVE
S PNEUM AG UR QL: NEGATIVE

## 2025-03-01 LAB
BACTERIA BLD CULT: NORMAL
BACTERIA BLD CULT: NORMAL
SIGNIFICANT IND 70042: NORMAL
SIGNIFICANT IND 70042: NORMAL
SITE SITE: NORMAL
SITE SITE: NORMAL
SOURCE SOURCE: NORMAL
SOURCE SOURCE: NORMAL

## 2025-03-02 ENCOUNTER — HOSPITAL ENCOUNTER (EMERGENCY)
Facility: MEDICAL CENTER | Age: 30
End: 2025-03-02
Attending: EMERGENCY MEDICINE

## 2025-03-02 ENCOUNTER — APPOINTMENT (OUTPATIENT)
Dept: RADIOLOGY | Facility: MEDICAL CENTER | Age: 30
End: 2025-03-02
Attending: EMERGENCY MEDICINE

## 2025-03-02 VITALS
WEIGHT: 111.11 LBS | SYSTOLIC BLOOD PRESSURE: 105 MMHG | HEART RATE: 63 BPM | BODY MASS INDEX: 20.98 KG/M2 | RESPIRATION RATE: 16 BRPM | DIASTOLIC BLOOD PRESSURE: 58 MMHG | OXYGEN SATURATION: 98 % | TEMPERATURE: 98.2 F | HEIGHT: 61 IN

## 2025-03-02 DIAGNOSIS — T83.9XXA PROBLEM WITH FOLEY CATHETER, INITIAL ENCOUNTER (HCC): ICD-10-CM

## 2025-03-02 DIAGNOSIS — N39.0 ACUTE UTI: ICD-10-CM

## 2025-03-02 LAB
ANION GAP SERPL CALC-SCNC: 10 MMOL/L (ref 7–16)
ANISOCYTOSIS BLD QL SMEAR: ABNORMAL
APPEARANCE UR: ABNORMAL
BACTERIA #/AREA URNS HPF: ABNORMAL /HPF
BACTERIA BLD CULT: NORMAL
BACTERIA BLD CULT: NORMAL
BASOPHILS # BLD AUTO: 0 % (ref 0–1.8)
BASOPHILS # BLD: 0 K/UL (ref 0–0.12)
BILIRUB UR QL STRIP.AUTO: NEGATIVE
BUN SERPL-MCNC: 10 MG/DL (ref 8–22)
CALCIUM SERPL-MCNC: 9.1 MG/DL (ref 8.5–10.5)
CASTS URNS QL MICRO: ABNORMAL /LPF (ref 0–2)
CHLORIDE SERPL-SCNC: 107 MMOL/L (ref 96–112)
CO2 SERPL-SCNC: 21 MMOL/L (ref 20–33)
COLOR UR: YELLOW
CREAT SERPL-MCNC: 0.79 MG/DL (ref 0.5–1.4)
EOSINOPHIL # BLD AUTO: 0 K/UL (ref 0–0.51)
EOSINOPHIL NFR BLD: 0 % (ref 0–6.9)
EPITHELIAL CELLS 1715: ABNORMAL /HPF (ref 0–5)
ERYTHROCYTE [DISTWIDTH] IN BLOOD BY AUTOMATED COUNT: 66.5 FL (ref 35.9–50)
GFR SERPLBLD CREATININE-BSD FMLA CKD-EPI: 104 ML/MIN/1.73 M 2
GLUCOSE SERPL-MCNC: 81 MG/DL (ref 65–99)
GLUCOSE UR STRIP.AUTO-MCNC: NEGATIVE MG/DL
HCT VFR BLD AUTO: 35.3 % (ref 37–47)
HGB BLD-MCNC: 10.5 G/DL (ref 12–16)
KETONES UR STRIP.AUTO-MCNC: NEGATIVE MG/DL
LEUKOCYTE ESTERASE UR QL STRIP.AUTO: ABNORMAL
LYMPHOCYTES # BLD AUTO: 4.1 K/UL (ref 1–4.8)
LYMPHOCYTES NFR BLD: 31.3 % (ref 22–41)
MANUAL DIFF BLD: NORMAL
MCH RBC QN AUTO: 22.1 PG (ref 27–33)
MCHC RBC AUTO-ENTMCNC: 29.7 G/DL (ref 32.2–35.5)
MCV RBC AUTO: 74.3 FL (ref 81.4–97.8)
MICRO URNS: ABNORMAL
MICROCYTES BLD QL SMEAR: ABNORMAL
MONOCYTES # BLD AUTO: 0.92 K/UL (ref 0–0.85)
MONOCYTES NFR BLD AUTO: 7 % (ref 0–13.4)
MORPHOLOGY BLD-IMP: NORMAL
NEUTROPHILS # BLD AUTO: 8.08 K/UL (ref 1.82–7.42)
NEUTROPHILS NFR BLD: 61.7 % (ref 44–72)
NITRITE UR QL STRIP.AUTO: NEGATIVE
NRBC # BLD AUTO: 0 K/UL
NRBC BLD-RTO: 0 /100 WBC (ref 0–0.2)
PH UR STRIP.AUTO: 6 [PH] (ref 5–8)
PLATELET # BLD AUTO: 376 K/UL (ref 164–446)
PLATELET BLD QL SMEAR: NORMAL
PMV BLD AUTO: 9.1 FL (ref 9–12.9)
POTASSIUM SERPL-SCNC: 3.8 MMOL/L (ref 3.6–5.5)
PROT UR QL STRIP: 300 MG/DL
RBC # BLD AUTO: 4.75 M/UL (ref 4.2–5.4)
RBC # URNS HPF: ABNORMAL /HPF (ref 0–2)
RBC BLD AUTO: PRESENT
RBC UR QL AUTO: ABNORMAL
SIGNIFICANT IND 70042: NORMAL
SIGNIFICANT IND 70042: NORMAL
SITE SITE: NORMAL
SITE SITE: NORMAL
SODIUM SERPL-SCNC: 138 MMOL/L (ref 135–145)
SOURCE SOURCE: NORMAL
SOURCE SOURCE: NORMAL
SP GR UR STRIP.AUTO: 1.02
UROBILINOGEN UR STRIP.AUTO-MCNC: 1 EU/DL
WBC # BLD AUTO: 13.1 K/UL (ref 4.8–10.8)
WBC #/AREA URNS HPF: ABNORMAL /HPF

## 2025-03-02 PROCEDURE — 81001 URINALYSIS AUTO W/SCOPE: CPT

## 2025-03-02 PROCEDURE — A9270 NON-COVERED ITEM OR SERVICE: HCPCS | Performed by: EMERGENCY MEDICINE

## 2025-03-02 PROCEDURE — 99284 EMERGENCY DEPT VISIT MOD MDM: CPT

## 2025-03-02 PROCEDURE — 36415 COLL VENOUS BLD VENIPUNCTURE: CPT

## 2025-03-02 PROCEDURE — 87086 URINE CULTURE/COLONY COUNT: CPT

## 2025-03-02 PROCEDURE — 87077 CULTURE AEROBIC IDENTIFY: CPT

## 2025-03-02 PROCEDURE — 85007 BL SMEAR W/DIFF WBC COUNT: CPT

## 2025-03-02 PROCEDURE — 76775 US EXAM ABDO BACK WALL LIM: CPT

## 2025-03-02 PROCEDURE — 85027 COMPLETE CBC AUTOMATED: CPT

## 2025-03-02 PROCEDURE — 80048 BASIC METABOLIC PNL TOTAL CA: CPT

## 2025-03-02 PROCEDURE — 700102 HCHG RX REV CODE 250 W/ 637 OVERRIDE(OP): Performed by: EMERGENCY MEDICINE

## 2025-03-02 PROCEDURE — 87186 SC STD MICRODIL/AGAR DIL: CPT

## 2025-03-02 RX ORDER — CEPHALEXIN 500 MG/1
500 CAPSULE ORAL ONCE
Status: DISCONTINUED | OUTPATIENT
Start: 2025-03-02 | End: 2025-03-02 | Stop reason: HOSPADM

## 2025-03-02 RX ORDER — SULFAMETHOXAZOLE AND TRIMETHOPRIM 800; 160 MG/1; MG/1
1 TABLET ORAL 2 TIMES DAILY
Qty: 14 TABLET | Refills: 0 | Status: ACTIVE | OUTPATIENT
Start: 2025-03-02 | End: 2025-03-05

## 2025-03-02 RX ORDER — SULFAMETHOXAZOLE AND TRIMETHOPRIM 800; 160 MG/1; MG/1
1 TABLET ORAL ONCE
Status: COMPLETED | OUTPATIENT
Start: 2025-03-02 | End: 2025-03-02

## 2025-03-02 RX ORDER — LIDOCAINE HYDROCHLORIDE 20 MG/ML
JELLY TOPICAL ONCE
Status: DISCONTINUED | OUTPATIENT
Start: 2025-03-02 | End: 2025-03-02 | Stop reason: HOSPADM

## 2025-03-02 RX ADMIN — SULFAMETHOXAZOLE AND TRIMETHOPRIM 1 TABLET: 800; 160 TABLET ORAL at 21:15

## 2025-03-02 ASSESSMENT — FIBROSIS 4 INDEX: FIB4 SCORE: 0.85

## 2025-03-02 ASSESSMENT — PAIN DESCRIPTION - PAIN TYPE: TYPE: ACUTE PAIN

## 2025-03-02 NOTE — LETTER
3/5/2025               Shae Wade Sudhir  [address] [address]        Dear Shae (MR#[id number])    As we have been unable to contact you by phone, this letter is sent in regards to your recent visit to the [facility] Emergency Department on 3/2/2025. During the visit, tests were performed to assist the physician in a medical diagnosis. A review of those tests requires that we notify you of the following:    Your urine culture and sensitivity was POSITIVE for a bacteria called Escherichia coli, and further treatment may be necessary. The currently prescribed antibiotic (sulfamethoxazole-trimethoprim) may not be effective in treating your infection. I have sent a new prescription for Levofloxacin 750 mg once a day for 5 days. It will be ready for  at your [facility] pharmacy on [address] IF YOU ARE NOT FEELING BETTER PLEASE CONTACT ME AS SOON AS POSSIBLE AT THE NUMBER BELOW.       Thank you for your cooperation in the matter.    Sincerely,  ED Culture Follow-Up Staff  Wolf Ferrera, PharmD    [facility], Emergency Department  [address] [place] [id number] [phone] (ED Culture Line)

## 2025-03-02 NOTE — Clinical Note
3/5/2025               Shae Wade Sudhir  [address] [facility] [address]        Dear Shae (MR#[id number])    This letter is sent in regards to your recent visit to the [facility] Emergency Department on 3/2/2025. During the visit, tests were performed to assist the physician in your medical diagnosis. A review of your tests requires that we notify you of the following:    Your {SITE:70953} culture was POSITIVE for a bacteria called {BACTERIA:94832}. The antibiotic prescribed for you ({ANTIBIOTICS:02415}) should be active to treat this bacteria. It is important that you continue taking your antibiotic until it is finished.     Please feel free to contact me at the number below if you have any questions or concerns. Thank you for your cooperation in the matter.    Sincerely,  ED Culture Follow-Up Staff  Wolf Ferrera, PharmD    [facility], Emergency Department  [address] [place] [id number]  [phone] (ED Culture Line)

## 2025-03-03 ENCOUNTER — TELEPHONE (OUTPATIENT)
Dept: UROLOGY | Facility: MEDICAL CENTER | Age: 30
End: 2025-03-03

## 2025-03-03 NOTE — TELEPHONE ENCOUNTER
Called to patient to schedule in office follow up after completion of stat imaging. Patients phone does not have voicemail set up. Unable to leave vm. Attempted to call step parent, phone is disconnected. Unable to contact patient 2x

## 2025-03-03 NOTE — ED NOTES
Patient provided discharge instructions. Pt medicated first dose abx per MAR.  Patient verbalized understanding of discharge and follow up instructions. Patient leaving ER in stable condition. Patient ambulatory with steady gait.

## 2025-03-03 NOTE — DISCHARGE INSTRUCTIONS
You were seen in the ER for discomfort with your Jarquin catheter.  We have remove the catheter at your request.  You do have a urinary tract infection which we are treating with antibiotics, take them as directed until the entire prescription is gone.  Please follow-up with your OB and urology for recheck.  As we discussed in the ER, if you begin to have difficulty urinating he will need to return immediately to the ER at which time we are going to replace the catheter.  I hope you feel better soon!

## 2025-03-03 NOTE — ED TRIAGE NOTES
Chief Complaint   Patient presents with    Urinary Catheter Problem     Pt has a hilton catheter which was due to be removed Friday. She is here to have the hilton catheter removed. She c/o pain to her urethra.      Pt ambulatory to triage for above complaint. A&Ox4, GCS 15, speaking in full sentences. Respirations equal and unlabored. NAD.  Appropriate protocols ordered.   Pt educated on triage process and instructed to notify staff of worsening condition.   Pt placed in the lobby in stable condition.

## 2025-03-03 NOTE — ED PROVIDER NOTES
ED Provider Note    CHIEF COMPLAINT  Chief Complaint   Patient presents with    Urinary Catheter Problem     Pt has a hilton catheter which was due to be removed Friday. She is here to have the hilton catheter removed. She c/o pain to her urethra.        EXTERNAL RECORDS REVIEWED  Inpatient Notes patient was admitted to this facility from 2/24 to 2/26/2025 due to sudden unresponsiveness.  She was found to have a metabolic acidosis with SOREN and dehydration.  Procalcitonin was elevated.  IV fluid was initiated and ultrasound of the abdomen revealed right hydronephrosis with a uterine mass.  She recently delivered a couple of weeks ago.  Urologist was consulted and the Hilton was placed.  CT revealed mild to moderate right hydronephrosis secondary to mass effect of the uterus and the pelvic ultrasound did not show any solid uterine or endometrial mass however showed small amount of simple appearing fluid within the endometrial cavity.  Neurologist recommended discharge with repeat ultrasound on 2/28 and reevaluation in clinic for Hilton removal.  Patient was given Unasyn and azithromycin for possible pneumonia and her leukocytosis resolved.  UA showed Enterococcus infection, pansensitive, and she was discharged on Keflex.    HPI/ROS  LIMITATION TO HISTORY   Select: : None  OUTSIDE HISTORIAN(S):  None    Shae Peguero is a 30 y.o. female who presents with a chief complaint of urethral discomfort.  The patient delivered a breech infant vaginally 2/7/2025 and then developed urinary retention.  A Hilton catheter was placed after she was found to have hydronephrosis due to an enlarged uterus and she followed up with her OB/GYN who recommended she have a second ultrasound of her kidneys with subsequent removal of the catheter if the hydronephrosis had resolved.  She was scheduled for an ultrasound last Friday but states when she arrived they told her the ultrasound was closed.  She then left the hospital without obtaining  "the imaging study.  Since that time she has had persistent discomfort in the urethra and has come to the ER requesting the catheter be removed.  She denies any fevers or chills, chest pain or shortness of breath, nausea or vomiting, diarrhea or constipation.  She has no abdominal pain.    PAST MEDICAL HISTORY   has a past medical history of Hydronephrosis (2/25/2025) and Urinary tract infection.    SURGICAL HISTORY   has a past surgical history that includes dilation and curettage (N/A, 4/10/2024).    FAMILY HISTORY  Family History   Problem Relation Age of Onset    No Known Problems Mother     No Known Problems Father     No Known Problems Sister     No Known Problems Brother        SOCIAL HISTORY  Social History     Tobacco Use    Smoking status: Never    Smokeless tobacco: Never   Vaping Use    Vaping status: Never Used   Substance and Sexual Activity    Alcohol use: Never    Drug use: Never    Sexual activity: Yes     Partners: Male     Comment: none       CURRENT MEDICATIONS  Home Medications    **Home medications have not yet been reviewed for this encounter**         ALLERGIES  No Known Allergies    PHYSICAL EXAM  VITAL SIGNS: /81   Pulse 88   Temp 36.3 °C (97.3 °F) (Temporal)   Resp 16   Ht 1.549 m (5' 1\")   Wt 50.4 kg (111 lb 1.8 oz)   SpO2 96%   Breastfeeding No   BMI 20.99 kg/m²    Physical Exam  Vitals and nursing note reviewed.   Constitutional:       Appearance: Normal appearance.   HENT:      Head: Normocephalic and atraumatic.      Right Ear: External ear normal.      Left Ear: External ear normal.      Nose: Nose normal.      Mouth/Throat:      Mouth: Mucous membranes are moist.      Pharynx: Oropharynx is clear.   Eyes:      Extraocular Movements: Extraocular movements intact.      Conjunctiva/sclera: Conjunctivae normal.      Pupils: Pupils are equal, round, and reactive to light.   Cardiovascular:      Rate and Rhythm: Normal rate and regular rhythm.   Pulmonary:      Effort: " Pulmonary effort is normal.      Breath sounds: Normal breath sounds.   Abdominal:      Palpations: Abdomen is soft.      Tenderness: There is no abdominal tenderness.   Musculoskeletal:         General: Normal range of motion.      Cervical back: Normal range of motion and neck supple.   Skin:     General: Skin is warm and dry.   Neurological:      General: No focal deficit present.      Mental Status: She is alert and oriented to person, place, and time.   Psychiatric:         Mood and Affect: Mood normal.         Behavior: Behavior normal.       EKG/LABS  Results for orders placed or performed during the hospital encounter of 03/02/25   CBC WITH DIFFERENTIAL    Collection Time: 03/02/25  8:14 PM   Result Value Ref Range    WBC 13.1 (H) 4.8 - 10.8 K/uL    RBC 4.75 4.20 - 5.40 M/uL    Hemoglobin 10.5 (L) 12.0 - 16.0 g/dL    Hematocrit 35.3 (L) 37.0 - 47.0 %    MCV 74.3 (L) 81.4 - 97.8 fL    MCH 22.1 (L) 27.0 - 33.0 pg    MCHC 29.7 (L) 32.2 - 35.5 g/dL    RDW 66.5 (H) 35.9 - 50.0 fL    Platelet Count 376 164 - 446 K/uL    MPV 9.1 9.0 - 12.9 fL    Neutrophils-Polys 61.70 44.00 - 72.00 %    Lymphocytes 31.30 22.00 - 41.00 %    Monocytes 7.00 0.00 - 13.40 %    Eosinophils 0.00 0.00 - 6.90 %    Basophils 0.00 0.00 - 1.80 %    Nucleated RBC 0.00 0.00 - 0.20 /100 WBC    Neutrophils (Absolute) 8.08 (H) 1.82 - 7.42 K/uL    Lymphs (Absolute) 4.10 1.00 - 4.80 K/uL    Monos (Absolute) 0.92 (H) 0.00 - 0.85 K/uL    Eos (Absolute) 0.00 0.00 - 0.51 K/uL    Baso (Absolute) 0.00 0.00 - 0.12 K/uL    NRBC (Absolute) 0.00 K/uL    Anisocytosis 1+     Microcytosis 1+    Basic Metabolic Panel    Collection Time: 03/02/25  8:14 PM   Result Value Ref Range    Sodium 138 135 - 145 mmol/L    Potassium 3.8 3.6 - 5.5 mmol/L    Chloride 107 96 - 112 mmol/L    Co2 21 20 - 33 mmol/L    Glucose 81 65 - 99 mg/dL    Bun 10 8 - 22 mg/dL    Creatinine 0.79 0.50 - 1.40 mg/dL    Calcium 9.1 8.5 - 10.5 mg/dL    Anion Gap 10.0 7.0 - 16.0   ESTIMATED GFR     Collection Time: 03/02/25  8:14 PM   Result Value Ref Range    GFR (CKD-EPI) 104 >60 mL/min/1.73 m 2   DIFFERENTIAL MANUAL    Collection Time: 03/02/25  8:14 PM   Result Value Ref Range    Manual Diff Status PERFORMED    PERIPHERAL SMEAR REVIEW    Collection Time: 03/02/25  8:14 PM   Result Value Ref Range    Peripheral Smear Review see below    PLATELET ESTIMATE    Collection Time: 03/02/25  8:14 PM   Result Value Ref Range    Plt Estimation Normal    MORPHOLOGY    Collection Time: 03/02/25  8:14 PM   Result Value Ref Range    RBC Morphology Present    URINALYSIS,CULTURE IF INDICATED    Collection Time: 03/02/25  8:19 PM    Specimen: Urine, Hilton Cath   Result Value Ref Range    Color Yellow     Character Cloudy (A)     Specific Gravity 1.016 <1.035    Ph 6.0 5.0 - 8.0    Glucose Negative Negative mg/dL    Ketones Negative Negative mg/dL    Protein 300 (A) Negative mg/dL    Bilirubin Negative Negative    Urobilinogen, Urine 1.0 <=1.0 EU/dL    Nitrite Negative Negative    Leukocyte Esterase Moderate (A) Negative    Occult Blood Moderate (A) Negative    Micro Urine Req Microscopic    URINE MICROSCOPIC (W/UA)    Collection Time: 03/02/25  8:19 PM   Result Value Ref Range    WBC  (A) /hpf    RBC 21-50 (A) 0 - 2 /hpf    Bacteria Many (A) None /hpf    Epithelial Cells 0-2 0 - 5 /hpf    Urine Casts 0-2 0 - 2 /lpf     RADIOLOGY/PROCEDURES   I have independently interpreted the diagnostic imaging associated with this visit and am waiting the final reading from the radiologist.   My preliminary interpretation is as follows: No hydronephrosis    Radiologist interpretation:  US-RENAL   Final Result      1.  No hydronephrosis. No renal calculus.        COURSE & MEDICAL DECISION MAKING    ASSESSMENT, COURSE AND PLAN  Care Narrative: This is a 30 year old female with recently placed hilton catheter for  hydronephrosis here requesting her catheter be removed due to discomfort in the area.     Arrives AF with normal VS. Appear  well hydrated and non-toxic. Exam is reassuring. Abdomen is soft and non-tender. She has no CVAT. She denies any fevers. We initially discussed using some lidocaine jelly to help with external urethral discomfort but she then declined stating that the pain was more internal and burning.    She has a leukocytosis to 13.1 without bandemia. There is a mild neutrophilia. Metabolic panel is reassuring with normal renal function. UA is consistent with infection with leukocyte esterase, WBCs, and many bacteria. She has already been treated with keflex for UTI.    Renal ultrasound was obtained which did not reveal hydronephrosis.     Patient was reevaluated at bedside. I recommended removal and replacement of the hilton catheter with initiation of antibiotics and urology follow up. The patient feels strongly about having her catheter removed this evening. She understands that she may develop urinary retention again and will require replacement of the hilton. She does not wish to wait in the ED after removal of the hilton to see if she can urinate independently. Her catheter was subsequently removed. She was started on Bactrim. She is not breastfeeding. I have asked her to follow up with urology and her OB/GYN. VS remain normal and stable. Discharged in good and stable condition with strict return precautions.    ADDITIONAL PROBLEMS MANAGED  None    DISPOSITION AND DISCUSSIONS  I have discussed management of the patient with the following physicians and MINGO's:  None    Discussion of management with other QHP or appropriate source(s): None     Escalation of care considered, and ultimately not performed:acute inpatient care management, however at this time, the patient is most appropriate for outpatient management    Barriers to care at this time, including but not limited to: Patient does not have established PCP.     Decision tools and prescription drugs considered including, but not limited to: Antibiotics Bactrim .    FINAL  DIAGNOSIS  1. Acute UTI    2. Problem with Jarquin catheter, initial encounter (HCC)      Electronically signed by: Hola Martin M.D., 3/2/2025 6:33 PM

## 2025-03-05 RX ORDER — LEVOFLOXACIN 750 MG/1
750 TABLET, FILM COATED ORAL DAILY
Qty: 5 TABLET | Refills: 0 | Status: ACTIVE | OUTPATIENT
Start: 2025-03-05

## 2025-03-05 NOTE — ED NOTES
ED Positive Culture Follow-up/Notification Note:   Date: 3/5/2025    Patient seen in the ED on 3/2/2025 for pain to urethra with catheter in place, patient also had WBC of 13.1. Hx of hydronephrosis with a uterine mass revealed with ultrasound, recent breech birth (Feb), and hilton catheter in place.   1. Acute UTI    2. Problem with Hilton catheter, initial encounter ([facility])      Discharge Medication List as of 3/2/2025  9:10 PM        START taking these medications    Details   sulfamethoxazole-trimethoprim (BACTRIM DS) 800-160 MG tablet Take 1 Tablet by mouth 2 times a day for 7 days., Disp-14 Tablet, R-0, Normal           Allergies: Patient has no known allergies.    Vitals:    03/02/25 1837 03/02/25 1900 03/02/25 2000 03/02/25 2102   BP: (!) 150/88 125/77 127/72 105/58   Pulse: 99 74 61 63   Resp: 15 16 16 16   Temp: 36.8 °C (98.2 °F)   36.8 °C (98.2 °F)   TempSrc: Temporal   Temporal   SpO2: 99% 99% 98% 98%   Weight:       Height:           Final cultures:   Results       Procedure Component Value Units Date/Time    URINE CULTURE(NEW) [[id number]]  (Abnormal)  (Susceptibility) Collected: 03/02/25 2019    Order Status: Completed Specimen: Urine Updated: 03/05/25 1001     Significant Indicator POS     Source UR     Site -     Culture Result -      Escherichia coli  >100,000 cfu/mL      Susceptibility       Escherichia coli (1)       Antibiotic Interpretation Microscan   Method Status    Ampicillin/sulbactam Resistant >16/8 mcg/mL CAROLYN Final    Amikacin  [*]  Sensitive <=16 mcg/mL CAROLYN Final    Ampicillin Resistant >16 mcg/mL CAROLYN Final    Amoxicillin/Clavulanic Acid Intermediate 16/8 mcg/mL CAROLYN Final    Aztreonam  [*]  Sensitive <=4 mcg/mL CAROLYN Final    Ceftolozane+Tazobactam  [*]  Sensitive <=2 mcg/mL CAROLYN Final    Ceftriaxone Sensitive <=1 mcg/mL CAROLYN Final    Ceftazidime  [*]  Sensitive <=1 mcg/mL CAROLYN Final    Cefazolin Sensitive 8 mcg/mL CAROLYN Final     Breakpoints when Cefazolin is used for therapy of infections  other  [Fall prevention counseling provided] : Fall prevention counseling provided [Adequate lighting] : Adequate lighting than uncomplicated UTIs due to Enterobacterales are as  follows:  CAROLYN and Interpretation:  <=2 S  4 I  >=8 R         Ciprofloxacin Sensitive <=0.25 mcg/mL CAROLYN Final    Cefepime Sensitive <=2 mcg/mL CAROLYN Final    Cefuroxime Sensitive <=4 mcg/mL CAROLYN Final    Ceftazidime+Avibactam  [*]  Sensitive <=4 mcg/mL CAROLYN Final    Ertapenem  [*]  Sensitive <=0.5 mcg/mL CAROLYN Final    Nitrofurantoin Sensitive <=32 mcg/mL CAROLYN Final    Gentamicin Resistant >8 mcg/mL CAROLYN Final    Imipenem  [*]  Sensitive <=1 mcg/mL CAROLYN Final    Levofloxacin Sensitive <=0.5 mcg/mL CAROLYN Final    Meropenem Sensitive <=1 mcg/mL CAROLYN Final    Meropenem/Vaborbactam Sensitive <=2 mcg/mL CAROLYN Final    Minocycline Sensitive <=4 mcg/mL CAROLYN Final    Pip/Tazobactam Sensitive <=8 mcg/mL CAROLYN Final    Trimeth/Sulfa Resistant >2/38 mcg/mL CAROLYN Final    Tetracycline  [*]  Sensitive <=4 mcg/mL CAROLYN Final    Tigecycline Sensitive <=2 mcg/mL CAROLYN Final    Tobramycin Intermediate 4 mcg/mL CAROLYN Final               [*]  Suppressed Antibiotic                   URINALYSIS,CULTURE IF INDICATED [[id number]]  (Abnormal) Collected: 03/02/25 2019    Order Status: Completed Specimen: Urine, Jarquin Cath Updated: 03/02/25 2100     Color Yellow     Character Cloudy     Specific Gravity 1.016     Ph 6.0     Glucose Negative mg/dL      Ketones Negative mg/dL      Protein 300 mg/dL      Bilirubin Negative     Urobilinogen, Urine 1.0 EU/dL      Nitrite Negative     Leukocyte Esterase Moderate     Occult Blood Moderate     Micro Urine Req Microscopic            Plan:   Isolated organism from urine culture E. coli is resistant to prescribed therapy.  Was unable to reach pastient to discuss culture results, change in antibiotics sent to pharmacy at [facility] on [address] in [facility].  Noted that patient is not breastfeeding, otherwise patient would wait 6 hrs after dose to breastfeed.      New Rx:  Levofloxacin 750 mg  x 5 days #5, no refills - MD: Justino per protocol    Wolf Ferrera, PharmD    [No throw rugs] : No throw rugs [Use proper foot wear] : Use proper foot wear [Use recommended devices] : Use recommended devices [Behavioral health counseling provided] : Behavioral health counseling provided [Sleep ___ hours/day] : Sleep [unfilled] hours/day [Engage in a relaxing activity] : Engage in a relaxing activity [Plan in advance] : Plan in advance [Potential consequences of obesity discussed] : Potential consequences of obesity discussed [Benefits of weight loss discussed] : Benefits of weight loss discussed [Structured Weight Management Program suggested:] : Structured weight management program suggested [Encouraged to maintain food diary] : Encouraged to maintain food diary [Encouraged to increase physical activity] : Encouraged to increase physical activity [Target Wt Loss Goal ___] : Weight Loss Goals: Target weight loss goal [unfilled] lbs [Weigh Self Weekly] : weigh self weekly [Decrease Portions] : decrease portions [____ min/wk Activity] : [unfilled] min/wk activity [Keep Food Diary] : keep food diary [None] : None [Good understanding] : Patient has a good understanding of disease, goals and obesity follow-up plan [FreeTextEntry4] : 15 minutes [de-identified] : Symptomatic patients : Test for influenza, if positive, treat for influenza and do not continue below. \par 1. Fever plus cough or shortness of breath : Test for RVP and COVID-19.\par 2.Indirect, circumstantial or unclear exposure to COVID-19, or other concerning cases not meeting above criteria: Please call [facility] to discuss testing. \par +++ All above cases must be reported to the [facility] registry. +++\par \par Asymptomatic patients: \par 1. Known first-degree direct-contact exposure to positive COVID-19 patient but asymptomatic: No testing PLUS 14 day self-quarantine. Pt to call if symptoms develop. Report to [facility] Registry.\par 2. No known exposure and asymptomatic, referred from outside healthcare organization: Please call [facility] to discuss testing. \par 3.All other asymptomatic patients with no known exposures: no testing, no exceptions.\par \par Dscd.D/E wt.loss needs to loose 10% TBW.DSCD.self monitoring, goal setting,problem solving w-b mod.portion control, avoidence of skipping meals, high glycemic foods,snacking and mindless eating in front of the tv.Suggested use of small plates and not keepingall of the food on the dinner table and leaving it at the stove top.Pt was also told to calorie count and an ian was recommended DSCD exercising 20 minutes per day:dscd:med /pharmaco bariatric tx options.\par \par  - Encouraged a low fat/low cholesterol diet\par  - Discussed Healthy eating, avoidance of concentrated sweets, and to include vegetables by at least 3 meals a day\par  - encouraged low glycemic fruits, grains and vegetables and a diet high in plant protein\par  - Discussed regular exercise\par  - Discussed importance of follow up physician visits\par \par Bp stable, continue with medications, dash diet, exercise, and dietary management.Continue to check home Bp’s.\par \par diet and exercise weight loss.  Low-salt low-fat ADA diet/ htn- Discussed diabetes physiology\par - Discussed importance of monitoring blood glucose levels\par - Encouraged a low fat/low cholesterol diet\par - Discussed symptoms of hyperglycemia and hypoglycemia\par - Discussed ADA glucose goals\par - Discussed  HGB A1c and the effects of blood glucose on the level\par - Discussed Healthy eating, avoidance of concentrated sweets, and to include vegetables by at least 2 meals a day\par - Discussed regular exercise\par - Discussed importance of follow up physician visits Limit intake of Sodium (Salt) to less than 2 grams a day to prevent fluid retention-swelling or worsening of symptoms. The importance of keeping the blood pressure at or below 130/80 to prevent stroke, heart attacks, kidney failure, blindness, and loss of limbs was  low chol diet. Avoid fried foods, red meat, butter, eggs, hard cheeses. Use canola or olive oil preferred. ::  was established in which goals would be set, monitoring would be done, and problem solving would also be addressed. The patient would be assisted using behavior change techniques, such as self-help and counseling through behavioral modification: Problem solving using hypnosis and positive medical reinforcement to achieve agreed-upon goals.\par - Discussed diabetes physiology\par - Discussed importance of monitoring blood glucose levels\par - Encouraged a low fat/low cholesterol diet\par - Discussed symptoms of hyperglycemia and hypoglycemia\par - Discussed ADA glucose goals\par - Discussed  HGB A1c and the effects of blood glucose on the level\par - Discussed Healthy eating, avoidance of concentrated sweets, and to include vegetables by at least 2 meals a day\par - Discussed regular exercise\par - Discussed importance of follow up physician visits\par \par \par face to face 15min\par

## (undated) DEVICE — CURRETTE TIP VAC CURV 16MM (10EA/BX)

## (undated) DEVICE — TUBING D & E COLLECTION SET (50EA/PK)

## (undated) DEVICE — SOLUTION PLASMA-LYTE PH 7.4 INJ 1000ML  (14EA/CA)

## (undated) DEVICE — VACURETTE 12MM CURVED 10/PKG

## (undated) DEVICE — VACURETTE 8MM CURVED 10/PKG

## (undated) DEVICE — KIT  I.V. START (100EA/CA)

## (undated) DEVICE — SET EXTENSION WITH 2 PORTS (48EA/CA) ***PART #2C8610 IS A SUBSTITUTE*****

## (undated) DEVICE — CATHETER IV NON-SAFETY 18 GA X 1 1/4 (50/BX 4BX/CA)

## (undated) DEVICE — VACURETTE 'F' TIP 8MM 10/PKG

## (undated) DEVICE — PACK ROOM TURNOVER L&D (12/CA)

## (undated) DEVICE — BOTTLE COLLECTION CANISTER LG - (10/PK)

## (undated) DEVICE — LACTATED RINGERS INJ 1000 ML - (14EA/CA 60CA/PF)

## (undated) DEVICE — BOTTLE SECONDARY SAFE TOUCH - W/SEAL CUP(10/BX)

## (undated) DEVICE — VACURETTE 14MM CURVED  1/2IN BASE (10EA/PK)

## (undated) DEVICE — WATER IRRIGATION STERILE 1000ML (12EA/CA)

## (undated) DEVICE — TUBING CLEARLINK DUO-VENT - C-FLO (48EA/CA)

## (undated) DEVICE — TRAY SRGPRP PVP IOD WT PRP - (20/CA)

## (undated) DEVICE — VACURETTE 10MM CURVED 10/PKG

## (undated) DEVICE — TRAP TISSUE SAFETOUCH

## (undated) DEVICE — HEAD HOLDER JUNIOR/ADULT

## (undated) DEVICE — GLOVE BIOGEL INDICATOR SZ 6 SURGICAL PF LTX -(50/BX)

## (undated) DEVICE — TUBING SCT 18IN BR SFTCH BTL - 10/BX GOES WITH 77350